# Patient Record
Sex: MALE | Race: WHITE | Employment: OTHER | URBAN - METROPOLITAN AREA
[De-identification: names, ages, dates, MRNs, and addresses within clinical notes are randomized per-mention and may not be internally consistent; named-entity substitution may affect disease eponyms.]

---

## 2017-02-20 ENCOUNTER — OFFICE VISIT (OUTPATIENT)
Dept: INTERNAL MEDICINE CLINIC | Age: 72
End: 2017-02-20

## 2017-02-20 VITALS
HEART RATE: 66 BPM | TEMPERATURE: 98.4 F | SYSTOLIC BLOOD PRESSURE: 102 MMHG | HEIGHT: 74 IN | WEIGHT: 230 LBS | BODY MASS INDEX: 29.52 KG/M2 | RESPIRATION RATE: 12 BRPM | OXYGEN SATURATION: 95 % | DIASTOLIC BLOOD PRESSURE: 63 MMHG

## 2017-02-20 DIAGNOSIS — E03.9 ACQUIRED HYPOTHYROIDISM: Primary | ICD-10-CM

## 2017-02-20 DIAGNOSIS — M79.652 BILATERAL THIGH PAIN: ICD-10-CM

## 2017-02-20 DIAGNOSIS — Z12.5 SCREENING PSA (PROSTATE SPECIFIC ANTIGEN): ICD-10-CM

## 2017-02-20 DIAGNOSIS — Z13.21 ENCOUNTER FOR VITAMIN DEFICIENCY SCREENING: ICD-10-CM

## 2017-02-20 DIAGNOSIS — M79.651 BILATERAL THIGH PAIN: ICD-10-CM

## 2017-02-20 DIAGNOSIS — R35.0 URINARY FREQUENCY: ICD-10-CM

## 2017-02-20 DIAGNOSIS — R73.01 IFG (IMPAIRED FASTING GLUCOSE): ICD-10-CM

## 2017-02-20 DIAGNOSIS — R29.898 WEAKNESS OF BOTH LEGS: ICD-10-CM

## 2017-02-20 RX ORDER — FEXOFENADINE HCL 60 MG
TABLET ORAL
COMMUNITY
End: 2017-09-07

## 2017-02-20 NOTE — MR AVS SNAPSHOT
Visit Information Date & Time Provider Department Dept. Phone Encounter #  
 2/20/2017 11:00 AM Neelima Dwyer MD [facility] [address] [id number] Upcoming Health Maintenance Date Due DTaP/Tdap/Td series (1 - Tdap) 10/10/1964 ZOSTER VACCINE AGE 60> 10/10/2003 GLAUCOMA SCREENING Q2Y 10/10/2008 MEDICARE YEARLY EXAM 10/10/2008 Pneumococcal 65+ Low/Medium Risk (2 of 2 - PPSV23) 1/19/2018 COLONOSCOPY 8/13/2023 Allergies as of 2/20/2017  Review Complete On: 2/20/2017 By: Neelima Dwyer MD  
  
 Severity Noted Reaction Type Reactions Advil [Ibuprofen]  02/20/2017    Hives Amitex Pse [Pseudoephedrine-guaifenesin]  04/11/2016    Other (comments) Dry mouth Hydrocodone-acetaminophen  04/11/2016    Rash, Itching, Swelling Levaquin [Levofloxacin]  04/11/2016    Rash Body aches Current Immunizations  Reviewed on 2/20/2017 Name Date Influenza Vaccine 1/19/2017 Pneumococcal Conjugate (PCV-13) 1/19/2017 Reviewed by Pippa Troy on 2/20/2017 at 11:26 AM  
 Reviewed by Neelima Dwyer MD on 2/20/2017 at 12:21 PM  
You Were Diagnosed With   
  
 Codes Comments Acquired hypothyroidism    -  Primary ICD-10-CM: E03.9 ICD-9-CM: 244.9 Bilateral thigh pain     ICD-10-CM: M79.651, O83.870 ICD-9-CM: 729.5 Weakness of both legs     ICD-10-CM: M62.81 ICD-9-CM: 729.89 Urinary frequency     ICD-10-CM: R35.0 ICD-9-CM: 788.41   
 IFG (impaired fasting glucose)     ICD-10-CM: R73.01 
ICD-9-CM: 790.21 Encounter for vitamin deficiency screening     ICD-10-CM: Z13.21 ICD-9-CM: V77.99 Screening PSA (prostate specific antigen)     ICD-10-CM: Z12.5 ICD-9-CM: V76.44 Vitals BP Pulse Temp Resp Height(growth percentile) Weight(growth percentile) 102/63 (BP 1 Location: Left arm, BP Patient Position: Sitting) 66 98.4 °F (36.9 °C) (Oral) 12 6' 2\" (1.88 m) 230 lb (104.3 kg) SpO2 BMI Smoking Status 95% 29.53 kg/m2 Never Smoker BMI and BSA Data Body Mass Index Body Surface Area  
 29.53 kg/m 2 2.33 m 2 Preferred Pharmacy Pharmacy Name Phone [facility] [address] AT [address] [facility] [phone] Your Updated Medication List  
  
   
This list is accurate as of: 17 12:26 PM.  Always use your most recent med list.  
  
  
  
  
 ALPRAZolam 0.25 mg tablet Commonly known as:  Pixie Oz Take  by mouth nightly. ASPIR-81 PO Take  by mouth. fexofenadine 60 mg tablet Commonly known as:  Morrie Mantis Take  by mouth.  
  
 levothyroxine 125 mcg tablet Commonly known as:  SYNTHROID Take  by mouth Daily (before breakfast). multivitamin tablet Commonly known as:  ONE A DAY Take 1 Tab by mouth daily. PARoxetine 20 mg tablet Commonly known as:  PAXIL Take  by mouth daily. varicella zoster vacine live 19,400 unit/0.65 mL Susr injection Commonly known as:  varicella-zoster vacine live 1 Vial by SubCUTAneous route once for 1 dose. Prescriptions Printed Refills  
 varicella zoster vacine live (VARICELLA-ZOSTER VACINE LIVE) 19,400 unit/0.65 mL susr injection 0 Si Vial by SubCUTAneous route once for 1 dose. Class: Print Route: SubCUTAneous We Performed the Following CBC W/O DIFF [64984 CPT(R)] CK [id number] CPT(R)] HEMOGLOBIN A1C WITH EAG [03405 CPT(R)] LIPID PANEL [39493 CPT(R)] METABOLIC PANEL, COMPREHENSIVE [35325 CPT(R)] PSA SCREENING (SCREENING) [ [facility]] T4, FREE [id number] CPT(R)] TSH 3RD GENERATION [66778 CPT(R)] URINALYSIS W/ RFLX MICROSCOPIC [53109 CPT(R)] VITAMIN D, 25 HYDROXY [id number] CPT(R)] To-Do List   
 Around 2017 Imaging:  MRI LUMB SPINE WO CONT Introducing [facility]!    
 Becki Waldrop introduces [employer] patient portal. Now you can access parts of your medical record, email your doctor's office, and request medication refills online. 1. In your internet browser, go to [email or web address] 2. Click on the First Time User? Click Here link in the Sign In box. You will see the New Member Sign Up page. 3. Enter your [employer] Access Code exactly as it appears below. You will not need to use this code after youve completed the sign-up process. If you do not sign up before the expiration date, you must request a new code. · [employer] Access Code: [email or web address] Expires: 5/21/2017 12:26 PM 
 
4. Enter the last four digits of your Social Security Number (xxxx) and Date of Birth (mm/dd/yyyy) as indicated and click Submit. You will be taken to the next sign-up page. 5. Create a [employer] ID. This will be your [employer] login ID and cannot be changed, so think of one that is secure and easy to remember. 6. Create a [employer] password. You can change your password at any time. 7. Enter your Password Reset Question and Answer. This can be used at a later time if you forget your password. 8. Enter your e-mail address. You will receive e-mail notification when new information is available in [address] 9. Click Sign Up. You can now view and download portions of your medical record. 10. Click the Download Summary menu link to download a portable copy of your medical information. If you have questions, please visit the Frequently Asked Questions section of the [employer] website. Remember, [employer] is NOT to be used for urgent needs. For medical emergencies, dial 911. Now available from your iPhone and Android! Please provide this summary of care documentation to your next provider. Your primary care clinician is listed as Shalini Anne. If you have any questions after today's visit, please call [phone].

## 2017-02-20 NOTE — PROGRESS NOTES
Patient presents to establish care. Patient states needs thyroid labs, urinary frequency for a year, over all weakness in his legs with some SOB. Had stress test 4/2016.

## 2017-02-20 NOTE — PROGRESS NOTES
HISTORY OF PRESENT ILLNESS    New patient to my practice, referred to me by his wife. Prior medical care has been from Dr. Yonis Allen.  he works as Retired . his  past medical history was reviewed, discussed, and summarized in the list below. Reports \"aching\" of anterior thighs for years. He rubs if often. Feels some muscle fatigue of legs while walking. No clear etiology. He rubs his legs to help. Had arteries checked. [place] Hx of knee arthroscopy in the past.  No recent knee issues, but reports stiffness. Reports lower back stiffness and pains. . No pain down to legs. No hx back imaging. Feels a wave come over him after eating at times. Feels more shaky and weak when that occurs. He eats and it improves. a1c 6.7% 4/2016. Wife tests his glucose and it was 90s when this occurs. Report urinary frequency for 2 years. Glucoses was ok per wife. No straining    Hypothyroidism  Reports no sig fatigue  denies heat/cold intolerance, bowel/skin changes or CVS symptoms, losing hair, feeling excessive energy, tremulousness, palpitations. Thyroid medication has been unchanged since last medication check and labs. TSH 0.456 4/6/16  Wt Readings from Last 3 Encounters:   02/20/17 230 lb (104.3 kg)   04/11/16 229 lb 9.6 oz (104.1 kg)       Review of Systems   All other systems reviewed and are negative, except as noted in HPI      Past Medical and Surgical History  Past Medical History   Diagnosis Date    Chronic anxiety     Coronary artery disease 4/11/2016     A.  EBT (10/4/10):  LM 8, , LCx 0, . Total = 331.  Environmental allergies      Dr. Bonilla Wong. also med allergies ibuprofen    Essential tremor     Hepatitis A      as child    Hypothyroidism 04/11/2016     saw Dr. Jumana Casillas int he past    IFG (impaired fasting glucose) 04/06/2016     a1c 6.7%    Lymphadenopathy      left axillary biopsy 2/14/02 - benign.   Dr. Stalin Krishna Normal cardiac stress test 04/2016 Dr. Maverick Armstrong    Obstructive sleep apnea      on CPAP. Dr. Cholo Pettit    Pancreatic mass      lymph node. EUS biopsy 1/22/02 benign, but \"possible pneumonia\"    Plantar fasciitis      Dr. Zeke Galan.  hx R cortisone injection    Screening for skin cancer      sees derm Dr. Khan Crimes    Septic shock [facility]) 04/04/2002     JW    Sleep apnea     Trigger finger      Dr. Vaughn Brunson. s/p R surgery        has a past surgical history that includes tonsillectomy; vasectomy; endoscopy (01/22/2002); lymphadenectomy (02/14/2002); knee arthroscopy (Left); colonoscopy (08/13/2013); and orthopaedic (Right). Current Outpatient Prescriptions   Medication Sig    ASPIRIN (ASPIR-81 PO) Take  by mouth.  fexofenadine (ALLEGRA) 60 mg tablet Take  by mouth.  ALPRAZolam (XANAX) 0.25 mg tablet Take  by mouth nightly.  levothyroxine (SYNTHROID) 125 mcg tablet Take  by mouth Daily (before breakfast).  PARoxetine (PAXIL) 20 mg tablet Take  by mouth daily.  multivitamin (ONE A DAY) tablet Take 1 Tab by mouth daily. No current facility-administered medications for this visit. reports that he has never smoked. He does not have any smokeless tobacco history on file. He reports that he does not drink alcohol or use illicit drugs. family history includes Cancer in his father and mother. Physical Exam   Nursing note and vitals reviewed. Blood pressure 102/63, pulse 66, temperature 98.4 °F (36.9 °C), temperature source Oral, resp. rate 12, height 6' 2\" (1.88 m), weight 230 lb (104.3 kg), SpO2 95 %. Constitutional: oriented to person, place, and time. No distress. Eyes: Conjunctivae are normal.   HEENT:  No cervical lymphadenopathy. No thyroid nodules or goiter  Cardiovascular: Normal rate. Regular rhythm, no murmurs, rubs.    No edema  Pulmonary/Chest: Effort normal. clear to auscultation  Abdominal: soft, non-tender, non-distended  Musculoskeletal:   difficulty standing from chair due to thigh pains and mild weakess  Bilateral thigh flexion 4/5.  5/5 lower leg strength  Neurological: Alert and oriented to person, place. Cranial nerves grossly intact. Normal gait   Mild tremor bilaterally  Skin: No rash noted. Psychiatric: Normal mood and affect. Behavior is normal.     ASSESSMENT and PLAN  Jasmina Faust was seen today for establish care. Diagnoses and all orders for this visit:    Acquired hypothyroidism  Unclear control  -     T4, FREE  -     TSH 3RD GENERATION    Bilateral thigh pain  Weakness of both legs  Symptoms have been relatively stable over a couple of years, but of unclear etiology. Recommend MRI to evaluate for spinal stenosis. He also does have chronic knee discomfort and consider knee and hip issues causing strain. Urinary frequency makes final issue possible. -     MRI LUMB SPINE WO CONT; Future  -     CK    Urinary frequency   Unclear etiology. Possible mild diabetes, check prostate next exam.  Check PSA. Rule out spinal  -     MRI LUMB SPINE WO CONT; Future  -     URINALYSIS W/ RFLX MICROSCOPIC  -     HEMOGLOBIN A1C WITH EAG  -     METABOLIC PANEL, COMPREHENSIVE    IFG (impaired fasting glucose)  May have some symptoms of hypoglycemia. Could consider starting metformin. Wife has diabetes. Will check labs. Recommend small frequent meals with complex carbs. -     LIPID PANEL  -     HEMOGLOBIN A1C WITH EAG  -     CBC W/O DIFF    Encounter for vitamin deficiency screening  -     VITAMIN D, 25 HYDROXY    Screening PSA (prostate specific antigen)  -     PSA - SCREENING ()    Other orders  -     varicella zoster vacine live (VARICELLA-ZOSTER VACINE LIVE) 19,400 unit/0.65 mL susr injection; 1 Vial by SubCUTAneous route once for 1 dose.       lab results and schedule of future lab studies reviewed with patient  reviewed medications and side effects in detail    Return to clinic for Medicare wellness exam

## 2017-02-22 ENCOUNTER — HOSPITAL ENCOUNTER (OUTPATIENT)
Dept: LAB | Age: 72
Discharge: HOME | End: 2017-02-22
Payer: MEDICARE

## 2017-02-22 PROCEDURE — 84443 ASSAY THYROID STIM HORMONE: CPT

## 2017-02-22 PROCEDURE — 85027 COMPLETE CBC AUTOMATED: CPT

## 2017-02-22 PROCEDURE — 36415 COLL VENOUS BLD VENIPUNCTURE: CPT

## 2017-02-22 PROCEDURE — 80061 LIPID PANEL: CPT

## 2017-02-22 PROCEDURE — 82550 ASSAY OF CK (CPK): CPT

## 2017-02-22 PROCEDURE — 84153 ASSAY OF PSA TOTAL: CPT

## 2017-02-22 PROCEDURE — 81003 URINALYSIS AUTO W/O SCOPE: CPT

## 2017-02-22 PROCEDURE — 83036 HEMOGLOBIN GLYCOSYLATED A1C: CPT

## 2017-02-22 PROCEDURE — 84439 ASSAY OF FREE THYROXINE: CPT

## 2017-02-22 PROCEDURE — 82306 VITAMIN D 25 HYDROXY: CPT

## 2017-02-22 PROCEDURE — 80053 COMPREHEN METABOLIC PANEL: CPT

## 2017-02-23 LAB
25(OH)D3+25(OH)D2 SERPL-MCNC: 29.9 NG/ML (ref 30–100)
ALBUMIN SERPL-MCNC: 3.8 G/DL (ref 3.5–4.8)
ALBUMIN/GLOB SERPL: 1.1 {RATIO} (ref 1.1–2.5)
ALP SERPL-CCNC: 117 IU/L (ref 39–117)
ALT SERPL-CCNC: 70 IU/L (ref 0–44)
APPEARANCE UR: ABNORMAL
AST SERPL-CCNC: 84 IU/L (ref 0–40)
BILIRUB SERPL-MCNC: 1.2 MG/DL (ref 0–1.2)
BILIRUB UR QL STRIP: NEGATIVE
BUN SERPL-MCNC: 14 MG/DL (ref 8–27)
BUN/CREAT SERPL: 17 (ref 10–22)
CALCIUM SERPL-MCNC: 9.2 MG/DL (ref 8.6–10.2)
CHLORIDE SERPL-SCNC: 100 MMOL/L (ref 96–106)
CHOLEST SERPL-MCNC: 161 MG/DL (ref 100–199)
CK SERPL-CCNC: 89 U/L (ref 24–204)
CO2 SERPL-SCNC: 26 MMOL/L (ref 18–29)
COLOR UR: YELLOW
CREAT SERPL-MCNC: 0.84 MG/DL (ref 0.76–1.27)
ERYTHROCYTE [DISTWIDTH] IN BLOOD BY AUTOMATED COUNT: 14.1 % (ref 12.3–15.4)
EST. AVERAGE GLUCOSE BLD GHB EST-MCNC: 151 MG/DL
GLOBULIN SER CALC-MCNC: 3.4 G/DL (ref 1.5–4.5)
GLUCOSE SERPL-MCNC: 120 MG/DL (ref 65–99)
GLUCOSE UR QL: NEGATIVE
HBA1C MFR BLD: 6.9 % (ref 4.8–5.6)
HCT VFR BLD AUTO: 44.9 % (ref 37.5–51)
HDLC SERPL-MCNC: 50 MG/DL
HGB BLD-MCNC: 15.3 G/DL (ref 12.6–17.7)
HGB UR QL STRIP: NEGATIVE
INTERPRETATION, 910389: NORMAL
KETONES UR QL STRIP: NEGATIVE
LDLC SERPL CALC-MCNC: 94 MG/DL (ref 0–99)
LEUKOCYTE ESTERASE UR QL STRIP: NEGATIVE
MCH RBC QN AUTO: 32.5 PG (ref 26.6–33)
MCHC RBC AUTO-ENTMCNC: 34.1 G/DL (ref 31.5–35.7)
MCV RBC AUTO: 95 FL (ref 79–97)
MICRO URNS: ABNORMAL
NITRITE UR QL STRIP: NEGATIVE
PH UR STRIP: 6 [PH] (ref 5–7.5)
PLATELET # BLD AUTO: 152 X10E3/UL (ref 150–379)
POTASSIUM SERPL-SCNC: 4.9 MMOL/L (ref 3.5–5.2)
PROT SERPL-MCNC: 7.2 G/DL (ref 6–8.5)
PROT UR QL STRIP: NEGATIVE
PSA SERPL-MCNC: 1.1 NG/ML (ref 0–4)
RBC # BLD AUTO: 4.71 X10E6/UL (ref 4.14–5.8)
SODIUM SERPL-SCNC: 139 MMOL/L (ref 134–144)
SP GR UR: 1.02 (ref 1–1.03)
T4 FREE SERPL-MCNC: 1.19 NG/DL (ref 0.82–1.77)
TRIGL SERPL-MCNC: 87 MG/DL (ref 0–149)
TSH SERPL DL<=0.005 MIU/L-ACNC: 0.46 UIU/ML (ref 0.45–4.5)
UROBILINOGEN UR STRIP-MCNC: 0.2 MG/DL (ref 0.2–1)
VLDLC SERPL CALC-MCNC: 17 MG/DL (ref 5–40)
WBC # BLD AUTO: 3.6 X10E3/UL (ref 3.4–10.8)

## 2017-03-01 ENCOUNTER — HOSPITAL ENCOUNTER (OUTPATIENT)
Dept: MRI IMAGING | Age: 72
Discharge: HOME | End: 2017-03-01
Attending: INTERNAL MEDICINE
Payer: MEDICARE

## 2017-03-01 DIAGNOSIS — M79.651 BILATERAL THIGH PAIN: ICD-10-CM

## 2017-03-01 DIAGNOSIS — R35.0 URINARY FREQUENCY: ICD-10-CM

## 2017-03-01 DIAGNOSIS — R29.898 WEAKNESS OF BOTH LEGS: ICD-10-CM

## 2017-03-01 DIAGNOSIS — M79.652 BILATERAL THIGH PAIN: ICD-10-CM

## 2017-03-01 PROCEDURE — 72148 MRI LUMBAR SPINE W/O DYE: CPT

## 2017-03-30 ENCOUNTER — DOCUMENTATION ONLY (OUTPATIENT)
Dept: INTERNAL MEDICINE CLINIC | Age: 72
End: 2017-03-30

## 2017-03-30 RX ORDER — ASPIRIN 81 MG/1
81 TABLET ORAL DAILY
Qty: 30 TAB | Refills: 0
Start: 2017-03-30 | End: 2017-09-07 | Stop reason: SDUPTHER

## 2017-04-04 NOTE — PROGRESS NOTES
Per Dr. Diaz Forte, changed ASA to the correct product and placed a new order.     Julio Kearney, PharmD, BCPS, CDE

## 2017-06-27 RX ORDER — ALPRAZOLAM 0.25 MG/1
0.25 TABLET ORAL
Qty: 60 TAB | Refills: 1 | OUTPATIENT
Start: 2017-06-27 | End: 2017-10-10 | Stop reason: SDUPTHER

## 2017-07-26 RX ORDER — LEVOTHYROXINE SODIUM 125 UG/1
125 TABLET ORAL
Qty: 90 TAB | Refills: 1 | Status: SHIPPED | OUTPATIENT
Start: 2017-07-26 | End: 2017-09-10 | Stop reason: SDUPTHER

## 2017-09-07 ENCOUNTER — HOSPITAL ENCOUNTER (OUTPATIENT)
Dept: LAB | Age: 72
Discharge: HOME | End: 2017-09-07
Payer: MEDICARE

## 2017-09-07 ENCOUNTER — OFFICE VISIT (OUTPATIENT)
Dept: INTERNAL MEDICINE CLINIC | Age: 72
End: 2017-09-07

## 2017-09-07 VITALS
HEIGHT: 74 IN | TEMPERATURE: 98.1 F | WEIGHT: 225 LBS | DIASTOLIC BLOOD PRESSURE: 62 MMHG | RESPIRATION RATE: 12 BRPM | SYSTOLIC BLOOD PRESSURE: 112 MMHG | HEART RATE: 68 BPM | BODY MASS INDEX: 28.88 KG/M2

## 2017-09-07 DIAGNOSIS — R73.01 IFG (IMPAIRED FASTING GLUCOSE): ICD-10-CM

## 2017-09-07 DIAGNOSIS — E03.9 ACQUIRED HYPOTHYROIDISM: ICD-10-CM

## 2017-09-07 DIAGNOSIS — Z13.31 SCREENING FOR DEPRESSION: ICD-10-CM

## 2017-09-07 DIAGNOSIS — H61.321 EXTERNAL EAR CANAL STENOSIS, INFLAMMATORY, RIGHT: ICD-10-CM

## 2017-09-07 DIAGNOSIS — Z00.00 MEDICARE ANNUAL WELLNESS VISIT, SUBSEQUENT: Primary | ICD-10-CM

## 2017-09-07 DIAGNOSIS — I25.10 CORONARY ARTERY DISEASE INVOLVING NATIVE CORONARY ARTERY OF NATIVE HEART WITHOUT ANGINA PECTORIS: ICD-10-CM

## 2017-09-07 DIAGNOSIS — H92.01 OTALGIA, RIGHT: ICD-10-CM

## 2017-09-07 DIAGNOSIS — Z71.89 ADVANCED CARE PLANNING/COUNSELING DISCUSSION: ICD-10-CM

## 2017-09-07 PROCEDURE — 84439 ASSAY OF FREE THYROXINE: CPT

## 2017-09-07 PROCEDURE — 83036 HEMOGLOBIN GLYCOSYLATED A1C: CPT

## 2017-09-07 PROCEDURE — 80053 COMPREHEN METABOLIC PANEL: CPT

## 2017-09-07 PROCEDURE — 84443 ASSAY THYROID STIM HORMONE: CPT

## 2017-09-07 PROCEDURE — 36415 COLL VENOUS BLD VENIPUNCTURE: CPT

## 2017-09-07 RX ORDER — ASPIRIN 81 MG/1
81 TABLET ORAL DAILY
Qty: 30 TAB | Refills: 11
Start: 2017-09-07 | End: 2021-10-20

## 2017-09-07 RX ORDER — TRIAMCINOLONE ACETONIDE 1 MG/G
CREAM TOPICAL 2 TIMES DAILY
Qty: 15 G | Refills: 0 | Status: SHIPPED | OUTPATIENT
Start: 2017-09-07 | End: 2017-09-14

## 2017-09-07 NOTE — PROGRESS NOTES
HISTORY OF PRESENT ILLNESS    Chief Complaint   Patient presents with    Ear Pain       Presents for follow-up    Presents with right ear pain. Was in   Onset was 4 day(s) ago. Severity is mild  Character of problem: feels mild pain, worse when pressing on it.    nothing makes the problem better. Associated symptoms include: none  Treatments tried include: medication not used    Impaired fasting glucose / Pre-diabetes follow-up  Lab Results   Component Value Date/Time    Glucose 120 02/22/2017 10:53 AM     Last hemoglobin a1c   Lab Results   Component Value Date/Time    Hemoglobin A1c 6.9 02/22/2017 10:53 AM   Diabetic diet compliance: compliant most of the time. Patient does not perform home glucose monitoring. Hypothyroidism follow-up  Reports  fatigue  denies heat/cold intolerance, bowel/skin changes or CVS symptoms, losing hair, feeling excessive energy, tremulousness, palpitations. Thyroid medication has been unchanged since last medication check and labs. Lab Results   Component Value Date/Time    TSH 0.464 02/22/2017 10:53 AM    T4, Free 1.19 02/22/2017 10:53 AM     Wt Readings from Last 3 Encounters:   09/07/17 225 lb (102.1 kg)   02/20/17 230 lb (104.3 kg)   03/01/17 225 lb (102.1 kg)         Review of Systems   All other systems reviewed and are negative, except as noted in HPI    Past Medical and Surgical History   has a past medical history of Chronic anxiety; Coronary artery disease (4/11/2016); Degenerative joint disease (DJD) of lumbar spine; Environmental allergies; Essential tremor; FH: colon cancer; Hepatitis A; Hypothyroidism (04/11/2016); IFG (impaired fasting glucose) (04/06/2016); Lymphadenopathy; Mononucleosis; Normal cardiac stress test (04/2016); Obstructive sleep apnea; Pancreatic mass; Plantar fasciitis; Screening for skin cancer; Septic shock ([facility] [address] (04/04/2002); Sleep apnea; and Trigger finger.    has a past surgical history that includes tonsillectomy; vasectomy; endoscopy (01/22/2002); lymphadenectomy (02/14/2002); knee arthroscopy (Left); colonoscopy (08/13/2013); and orthopaedic (Right). reports that he has never smoked. He has never used smokeless tobacco. He reports that he does not drink alcohol or use illicit drugs. family history includes Cancer in his father and mother. Physical Exam   Nursing note and vitals reviewed. Blood pressure 112/62, pulse 68, temperature 98.1 °F (36.7 °C), temperature source Oral, resp. rate 12, height 6' 2\" (1.88 m), weight 225 lb (102.1 kg). Constitutional:  No distress. Eyes: Conjunctivae are normal.   Ears:  Hearing grossly intact  Right ear canal normal, but mild tenderness, posterior. Normal TM  Cardiovascular: Normal rate. regular rhythm, no murmurs or gallops  No edema  Pulmonary/Chest: Effort normal.   CTAB  Musculoskeletal: moves all 4 extremities   Neurological: Alert and oriented to person, place, and time. Skin: No rash noted. Psychiatric: Normal mood and affect. Behavior is normal.     ASSESSMENT and PLAN  Diagnoses and all orders for this visit:    1. Medicare annual wellness visit, subsequent  Physician Addendum  I personally saw and examined the patient. I have discussed and reviewed note with Nurse Navigator and agree with the Nurse Navigator's findings and recommendations for this Wellness Exam.  I was present during the key portions of separately billed procedures. Orders written and signed by me as per chart. Sadie Willis MD      2. Otalgia, right - perhaps mild canal inflammation. Chronic flakiness  -     triamcinolone acetonide (KENALOG) 0.1 % topical cream; Apply  to affected area two (2) times a day for 7 days. 3. External ear canal stenosis, inflammatory, right??    4. Acquired hypothyroidism - based on my history, the overall control of this problem unclear  -     TSH 3RD GENERATION  -     T4, FREE    5. IFG (impaired fasting glucose) - consider metformin.    The patient is asked to make an attempt to improve diet and exercise patterns to aid in medical management of this problem. -     HEMOGLOBIN A1C WITH EAG  -     METABOLIC PANEL, COMPREHENSIVE    6. Coronary artery disease involving native coronary artery of native heart without angina pectoris  -     aspirin delayed-release 81 mg tablet; Take 1 Tab by mouth daily. There are no Patient Instructions on file for this visit.    lab results and schedule of future lab studies reviewed with patient  reviewed medications and side effects in detail    Return to clinic for further evaluation if new symptoms develop    Follow-up Disposition: Not on File    Current Outpatient Prescriptions   Medication Sig    aspirin delayed-release 81 mg tablet Take 1 Tab by mouth daily.  triamcinolone acetonide (KENALOG) 0.1 % topical cream Apply  to affected area two (2) times a day for 7 days.  levothyroxine (SYNTHROID) 125 mcg tablet Take 1 Tab by mouth Daily (before breakfast).  ALPRAZolam (XANAX) 0.25 mg tablet Take 1 Tab by mouth two (2) times daily as needed for Anxiety. Max Daily Amount: 0.5 mg.    PARoxetine (PAXIL) 20 mg tablet Take  by mouth daily.  multivitamin (ONE A DAY) tablet Take 1 Tab by mouth daily. No current facility-administered medications for this visit.

## 2017-09-07 NOTE — MR AVS SNAPSHOT
Visit Information Date & Time Provider Department Dept. Phone Encounter #  
 9/7/2017  1:15 PM Louellen Rinne, MD [facility] [address] [id number] Upcoming Health Maintenance Date Due  
 GLAUCOMA SCREENING Q2Y 10/10/2008 INFLUENZA AGE 9 TO ADULT 8/1/2017 Pneumococcal 65+ Low/Medium Risk (2 of 2 - PPSV23) 1/19/2018 COLONOSCOPY 8/13/2018 MEDICARE YEARLY EXAM 9/8/2018 DTaP/Tdap/Td series (2 - Td) 9/7/2027 Allergies as of 9/7/2017  Review Complete On: 9/7/2017 By: Louellen Rinne, MD  
  
 Severity Noted Reaction Type Reactions Advil [Ibuprofen]  02/20/2017    Hives Amitex Pse [Pseudoephedrine-guaifenesin]  04/11/2016    Other (comments) Dry mouth Hydrocodone-acetaminophen  04/11/2016    Rash, Itching, Swelling Levaquin [Levofloxacin]  04/11/2016    Rash Body aches Current Immunizations  Reviewed on 9/7/2017 Name Date Influenza Vaccine 1/19/2017 Pneumococcal Conjugate (PCV-13) 1/19/2017 Reviewed by Louellen Rinne, MD on 9/7/2017 at  1:32 PM  
You Were Diagnosed With   
  
 Codes Comments Otalgia, right    -  Primary ICD-10-CM: H92.01 
ICD-9-CM: 388.70 External ear canal stenosis, inflammatory, right     ICD-10-CM: R17.745 ICD-9-CM: 380.53 Acquired hypothyroidism     ICD-10-CM: E03.9 ICD-9-CM: 244.9 IFG (impaired fasting glucose)     ICD-10-CM: R73.01 
ICD-9-CM: 790.21 Coronary artery disease involving native coronary artery of native heart without angina pectoris     ICD-10-CM: I25.10 ICD-9-CM: 414.01 Vitals BP Pulse Temp Resp Height(growth percentile) Weight(growth percentile) 112/62 (BP 1 Location: Right arm, BP Patient Position: Sitting) 68 98.1 °F (36.7 °C) (Oral) 12 6' 2\" (1.88 m) 225 lb (102.1 kg) BMI Smoking Status 28.89 kg/m2 Never Smoker Vitals History BMI and BSA Data  Body Mass Index Body Surface Area  
 28.89 kg/m 2 2.31 m 2  
  
  
 Preferred Pharmacy Pharmacy Name Phone [facility] [address]  [facility] [phone] Your Updated Medication List  
  
   
This list is accurate as of: 9/7/17  1:46 PM.  Always use your most recent med list.  
  
  
  
  
 ALPRAZolam 0.25 mg tablet Commonly known as:  Dave Corti Take 1 Tab by mouth two (2) times daily as needed for Anxiety. Max Daily Amount: 0.5 mg.  
  
 aspirin delayed-release 81 mg tablet Take 1 Tab by mouth daily. levothyroxine 125 mcg tablet Commonly known as:  SYNTHROID Take 1 Tab by mouth Daily (before breakfast). multivitamin tablet Commonly known as:  ONE A DAY Take 1 Tab by mouth daily. PARoxetine 20 mg tablet Commonly known as:  PAXIL Take  by mouth daily. triamcinolone acetonide 0.1 % topical cream  
Commonly known as:  KENALOG Apply  to affected area two (2) times a day for 7 days. Prescriptions Sent to Pharmacy Refills  
 triamcinolone acetonide (KENALOG) 0.1 % topical cream 0 Sig: Apply  to affected area two (2) times a day for 7 days. Class: Normal  
 Pharmacy: [facility]  [address], [place] - [address] AT [facility] [address] [facility] [address] ([facility] Ph #: [phone] Route: Topical  
  
We Performed the Following HEMOGLOBIN A1C WITH EAG [31037 CPT(R)] METABOLIC PANEL, COMPREHENSIVE [29606 CPT(R)] T4, FREE [id number] CPT(R)] TSH 3RD GENERATION [83881 CPT(R)] Introducing [facility]! Aster English introduces [employer] patient portal. Now you can access parts of your medical record, email your doctor's office, and request medication refills online. 1. In your internet browser, go to [email or web address] 2. Click on the First Time User? Click Here link in the Sign In box. You will see the New Member Sign Up page. 3. Enter your [email or web address] Access Code exactly as it appears below. You will not need to use this code after youve completed the sign-up process. If you do not sign up before the expiration date, you must request a new code. · [employer] Access Code: [email or web address] Expires: 12/6/2017  1:46 PM 
 
4. Enter the last four digits of your Social Security Number (xxxx) and Date of Birth (mm/dd/yyyy) as indicated and click Submit. You will be taken to the next sign-up page. 5. Create a [employer] ID. This will be your [employer] login ID and cannot be changed, so think of one that is secure and easy to remember. 6. Create a [employer] password. You can change your password at any time. 7. Enter your Password Reset Question and Answer. This can be used at a later time if you forget your password. 8. Enter your e-mail address. You will receive e-mail notification when new information is available in [address] 9. Click Sign Up. You can now view and download portions of your medical record. 10. Click the Download Summary menu link to download a portable copy of your medical information. If you have questions, please visit the Frequently Asked Questions section of the [employer] website. Remember, [employer] is NOT to be used for urgent needs. For medical emergencies, dial 911. Now available from your iPhone and Android! Please provide this summary of care documentation to your next provider. Your primary care clinician is listed as Eladio Overton. If you have any questions after today's visit, please call [phone].

## 2017-09-08 PROBLEM — Z71.89 ADVANCED CARE PLANNING/COUNSELING DISCUSSION: Status: ACTIVE | Noted: 2017-09-08

## 2017-09-08 LAB
ALBUMIN SERPL-MCNC: 3.5 G/DL (ref 3.5–4.8)
ALBUMIN/GLOB SERPL: 1.1 {RATIO} (ref 1.2–2.2)
ALP SERPL-CCNC: 113 IU/L (ref 39–117)
ALT SERPL-CCNC: 48 IU/L (ref 0–44)
AST SERPL-CCNC: 68 IU/L (ref 0–40)
BILIRUB SERPL-MCNC: 0.7 MG/DL (ref 0–1.2)
BUN SERPL-MCNC: 11 MG/DL (ref 8–27)
BUN/CREAT SERPL: 14 (ref 10–24)
CALCIUM SERPL-MCNC: 9.1 MG/DL (ref 8.6–10.2)
CHLORIDE SERPL-SCNC: 104 MMOL/L (ref 96–106)
CO2 SERPL-SCNC: 26 MMOL/L (ref 18–29)
CREAT SERPL-MCNC: 0.81 MG/DL (ref 0.76–1.27)
EST. AVERAGE GLUCOSE BLD GHB EST-MCNC: 128 MG/DL
GLOBULIN SER CALC-MCNC: 3.2 G/DL (ref 1.5–4.5)
GLUCOSE SERPL-MCNC: 169 MG/DL (ref 65–99)
HBA1C MFR BLD: 6.1 % (ref 4.8–5.6)
POTASSIUM SERPL-SCNC: 4.8 MMOL/L (ref 3.5–5.2)
PROT SERPL-MCNC: 6.7 G/DL (ref 6–8.5)
SODIUM SERPL-SCNC: 142 MMOL/L (ref 134–144)
T4 FREE SERPL-MCNC: 0.79 NG/DL (ref 0.82–1.77)
TSH SERPL DL<=0.005 MIU/L-ACNC: 3.42 UIU/ML (ref 0.45–4.5)

## 2017-09-08 NOTE — PATIENT INSTRUCTIONS
Medicare Part B Preventive Services Guidelines/Limitations Date last completed and Frequency Due Date   Cardiovascular Screening Blood Tests (every 5 years)  Total cholesterol, HDL, Triglycerides Order as a panel if possible Completed 2/2017    As recommended by your PCP As recommended by your PCP or Specialist   Colorectal Cancer Screening  -Fecal occult blood test (annual)  -Flexible sigmoidoscopy (5y)  -Screening colonoscopy (10y)  -Barium Enema Age 49-80; After age [de-identified] if history of abnormal results Completed 8/13/2013     Recommended follow-up in 5 years As recommended by your PCP or Specialist     Counseling to Prevent Tobacco Use (up to 8 sessions per year)  - Counseling greater than 3 and up to 10 minutes  - Counseling greater than 10 minutes Patients must be asymptomatic of tobacco-related conditions to receive as preventive service N/A N/A   Diabetes Screening Tests (at least every 3 years, Medicare covers annually or at 6-month intervals for prediabetic patients)    Fasting blood sugar (FBS) or glucose tolerance test (GTT) Patient must be diagnosed with one of the following:  -Hypertension, Dyslipidemia, obesity, previous impaired FBS or GTT  Or any two of the following: overweight, FH of diabetes, age ? 72, history of gestational diabetes, birth of baby weighing more than 9 pounds Completed 2/2017    Recommended every 3 years for non-diabetics    Recommended every 3-6 months for Pre-Diabetics and Diabetics As recommended by your PCP or Specialist     Glaucoma Screening (no USPSTF recommendation) Diabetes mellitus, family history, , age 48 or over,  American, age 72 or over Completed within the last year    Recommended annually As recommended by your PCP or Specialist   Prostate Cancer Screening (annually up to age 76)  - Digital rectal exam (JAYLEN)  - Prostate specific antigen (PSA) Annually (age 48 or over), JAYLEN not paid separately when covered E/M service is provided on same date Completed 2/2017    Recommended annually to age 76 As recommended by your PCP or Specialist     Seasonal Influenza Vaccination (annually)  Completed 1/2017    Recommended Annually Due Fall 2017   TDAP Vaccination  Td completed 6/24/2006     Recommended every 10 years As recommended by your PCP or Specialist   Zoster (Shingles) Vaccination Covered by Medicare Part D through the pharmacy- PCP provides prescription Never received    recommended once over age 48  As recommended by your PCP or Specialist   Pneumococcal Vaccination (once after 72)  Pneumo 23-   Recommended once over the age of 72    Prevnar 15- 1/2017 Recommended once over the age of 72 As recommended by your PCP or Specialist    Complete       Ultrasound Screening for Abdominal Aortic Aneurysm (AAA) (once) Patient must be referred through IPPE and not have had a screening for abdominal aortic aneurysm before under Medicare. Limited to patients who meet one of the following criteria:  - Men who are 73-68 years old and have smoked more than 100 cigarettes in their lifetime.  -Anyone with a FH of AAA  -Anyone recommended for screening by USPSTF Not indicated unless recommended by PCP   Not indicated unless recommended by PCP     Family Practice Management 2011    Please bring a copy of your completed advance medical directive to the office so it may be added to your medical record. Thank you. If you have any questions or concerns please feel free to contact me at [phone]. It was a pleasure meeting you today and participating in your healthcare. Vandana Swan RN      Medicare Wellness Visit, Male    The best way to live healthy is to have a healthy lifestyle by eating a well-balanced diet, exercising regularly, limiting alcohol and stopping smoking. Regular physical exams and screening tests are another way to keep healthy.    Preventive exams provided by your health care provider can find health problems before they become diseases or illnesses. Preventive services including immunizations, screening tests, monitoring and exams can help you take care of your own health. All people over age 72 should have a pneumovax  and and a prevnar shot to prevent pneumonia. These are once in a lifetime unless you and your provider decide differently. All people over 65 should have a yearly flu shot and a tetanus vaccine every 10 years. Screening for diabetes mellitus with a blood sugar test should be done every year. Glaucoma is a disease of the eye due to increased ocular pressure that can lead to blindness and it should be done every year by an eye professional.    Cardiovascular screening tests that check for elevated lipids (fatty part of blood) which can lead to heart disease and strokes should be done every 5 years. Colorectal screening that evaluates for blood or polyps in your colon should be done yearly as a stool test or every five years as a flexible sigmoidoscope or every 10 years as a colonoscopy up to age 76. Men up to age 76 may need a screening blood test for prostate cancer at certain intervals, depending on their personal and family history. This decision is between the patient and his provider. If you have been a smoker or had family history of abdominal aortic aneurysms, you and your provider may decide to schedule an ultrasound test of your aorta. Hepatitis C screening is also recommended for anyone born between [birth year] through [address]. A shingles vaccine is also recommended once in a lifetime after age 61. Your Medicare Wellness Exam is recommended annually.     Here is a list of your current Health Maintenance items with a due date:  Health Maintenance Due   Topic Date Due    Glaucoma Screening   10/10/2008    Flu Vaccine  08/01/2017

## 2017-09-08 NOTE — PROGRESS NOTES
Nurse Navigator Medicare Wellness Visit performed by ABELINO Martin RN    This is a Subsequent [place] Exam (AWV) (Performed 12 months after IPPE or effective date of Medicare Part B enrollment, Once in a lifetime)    I have reviewed the patient's medical history in detail and updated the computerized patient record. History     Past Medical History:   Diagnosis Date    Chronic anxiety     Coronary artery disease 4/11/2016    A.  EBT (10/4/10):  LM 8, , LCx 0, . Total = 331.  Degenerative joint disease (DJD) of lumbar spine     MRI 3/2017 showes moderate disease and mild stenosis    Environmental allergies     Dr. Arsh Puente. also med allergies ibuprofen    Essential tremor     FH: colon cancer     mother    Hepatitis A     as child    Hypothyroidism 04/11/2016    saw Dr. Wilver Ford int he past    IFG (impaired fasting glucose) 04/06/2016    a1c 6.7%    Lymphadenopathy     left axillary biopsy 2/14/02 - benign. Dr. John Beyer    Mononucleosis     Normal cardiac stress test 04/2016    Dr. Kerethi Macias Obstructive sleep apnea     on CPAP. Dr. Yissel Washington    Pancreatic mass     lymph node. EUS biopsy 1/22/02 benign, but \"possible pneumonia\"    Plantar fasciitis     Dr. Allison Ugalde.  hx R cortisone injection    Screening for skin cancer     sees derm Dr. Joe Lomeli    Septic shock [facility]) 04/04/2002    JW    Sleep apnea     Trigger finger     Dr. Dillan Godfrey. s/p R surgery      Past Surgical History:   Procedure Laterality Date    HX COLONOSCOPY  08/13/2013    4/15/2002, 11/11/2009, 8/13/2013- Dr Rizo Mail    HX ENDOSCOPY  01/22/2002    Dr Rizo Mail    HX KNEE ARTHROSCOPY Left     HX LYMPHADENECTOMY  02/14/2002    under left arm for biopsy    HX ORTHOPAEDIC Right     trigger finger    HX TONSILLECTOMY      HX VASECTOMY       Current Outpatient Prescriptions   Medication Sig Dispense Refill    aspirin delayed-release 81 mg tablet Take 1 Tab by mouth daily.  30 Tab 11    triamcinolone acetonide (KENALOG) 0.1 % topical cream Apply  to affected area two (2) times a day for 7 days. 15 g 0    levothyroxine (SYNTHROID) 125 mcg tablet Take 1 Tab by mouth Daily (before breakfast). 90 Tab 1    ALPRAZolam (XANAX) 0.25 mg tablet Take 1 Tab by mouth two (2) times daily as needed for Anxiety. Max Daily Amount: 0.5 mg. 60 Tab 1    PARoxetine (PAXIL) 20 mg tablet Take  by mouth daily.  multivitamin (ONE A DAY) tablet Take 1 Tab by mouth daily. Allergies   Allergen Reactions    Advil [Ibuprofen] Hives    Amitex Pse [Pseudoephedrine-Guaifenesin] Other (comments)     Dry mouth    Hydrocodone-Acetaminophen Rash, Itching and Swelling    Levaquin [Levofloxacin] Rash     Body aches     Family History   Problem Relation Age of Onset    Cancer Mother      colon    Cancer Father      lung     Social History   Substance Use Topics    Smoking status: Never Smoker    Smokeless tobacco: Never Used    Alcohol use No     Patient Active Problem List   Diagnosis Code    Coronary artery disease I25.10    Obstructive sleep apnea G47.33    Hypothyroidism E03.9    Normal cardiac stress test Z13.6    Chronic anxiety F41.9    Environmental allergies Z91.09    Hepatitis A B15.9    Lymphadenopathy R59.1    Mononucleosis B27.90    Pancreatic mass K86.9    Plantar fasciitis M72.2    Screening for skin cancer Z12.83    Septic shock (HCC) A41.9, R65.21    Sleep apnea G47.30    Trigger finger M65.30    IFG (impaired fasting glucose) R73.01    Essential tremor G25.0    Degenerative joint disease (DJD) of lumbar spine M47.816    Advanced care planning/counseling discussion Z71.89       Depression Risk Factor Screening:   Patient denies feelings of being down, depressed or hopeless at this time. Patient states that they have a strong support system within their family & friends.    PHQ over the last two weeks 9/7/2017   Little interest or pleasure in doing things Not at all   Feeling down, depressed or hopeless Not at all   Total Score PHQ 2 0     Alcohol Risk Factor Screening: You do not drink alcohol or very rarely. Functional Ability and Level of Safety:   Hearing Loss  Hearing is good. Activities of Daily Living  The home contains: no safety equipment  Patient does total self care     Patient states that he lives with his wife in a private residence. Patient states independence in all ADLs & denies the use of assistive devices for ambulation. NN encouraged patient to continue and/ or introduce routine physical exercise into their daily routine as applicable & as recommended by PCP. Patient verbalized understanding & agreement to take this into consideration. Patient shares that he enjoys being active with kayaking & gardening. ADL Assessment 9/8/2017   Feeding yourself No Help Needed   Getting from bed to chair No Help Needed   Getting dressed No Help Needed   Bathing or showering No Help Needed   Walk across the room (includes cane/walker) No Help Needed   Using the telphone No Help Needed   Taking your medications No Help Needed   Preparing meals No Help Needed   Managing money (expenses/bills) No Help Needed   Moderately strenuous housework (laundry) No Help Needed   Shopping for personal items (toiletries/medicines) No Help Needed   Shopping for groceries No Help Needed   Driving No Help Needed   Climbing a flight of stairs No Help Needed   Getting to places beyond walking distances No Help Needed       Patient denies falls within the past year & verbalizes awareness of fall prevention strategies. Fall RiskFall Risk Assessment, last 12 mths 9/7/2017   Able to walk? Yes   Fall in past 12 months? No       Abuse Screen  Patient is not abused     Abuse Screening Questionnaire 9/7/2017   Do you ever feel afraid of your partner? N   Are you in a relationship with someone who physically or mentally threatens you? N   Is it safe for you to go home?  Y       Cognitive Screening   Evaluation of Cognitive Function:  Has your family/caregiver stated any concerns about your memory: no  Normal    Patient Care Team   Patient Care Team:  Fran Blackwood MD as PCP - General (Internal Medicine)  Ricci Vincent MD (Pulmonary Disease)    Assessment/Plan   Education and counseling provided:  Are appropriate based on today's review and evaluation  End-of-Life planning (with patient's consent)  Pneumococcal Vaccine  Influenza Vaccine  Prostate cancer screening tests (PSA, covered annually)  Colorectal cancer screening tests  Screening for glaucoma  tdap & shingles vaccinations    Diagnoses and all orders for this visit:    1. Medicare annual wellness visit, subsequent    2. Otalgia, right  -     triamcinolone acetonide (KENALOG) 0.1 % topical cream; Apply  to affected area two (2) times a day for 7 days. 3. External ear canal stenosis, inflammatory, right    4. Acquired hypothyroidism  -     TSH 3RD GENERATION  -     T4, FREE    5. IFG (impaired fasting glucose)  -     HEMOGLOBIN A1C WITH EAG  -     METABOLIC PANEL, COMPREHENSIVE    6. Coronary artery disease involving native coronary artery of native heart without angina pectoris  -     aspirin delayed-release 81 mg tablet; Take 1 Tab by mouth daily. 7. Advanced care planning/counseling discussion        Health Maintenance Due   Topic Date Due    GLAUCOMA SCREENING Q2Y  10/10/2008    INFLUENZA AGE 9 TO ADULT  08/01/2017     AWV Summary:    1. Patient states conversation about Advanced Medical Directive has been started, but nothing written down yet. NN encouraged patient to complete Advanced Medical Directive paperwork & bring a copy to the office for scanning into the medical record. Patient verbalized understanding & agreement.     2. Patient is up to date on the following immunizations:  Immunization History   Administered Date(s) Administered    Influenza Vaccine 01/19/2017    Pneumococcal Conjugate (PCV-13) 01/19/2017   Patient confirmed the aforementioned preventative immunization dates are correct. Patient is unable to recall the date of their last tdap vaccine. NN encouraged patient to check home records & if information obtained, to please notify PCP's office with the details. Patient verbalized agreement. Patient states that he has a prescription for the shingles vaccine, but he was unable to get the vaccine due to the high out of pocket cost. Patient verbalized awareness that he is due for a pneumonia 23 vaccine in January 2018. Patient's health maintenance immunization record has been updated & is current. 3. Patient states that he follows his PCP's recommendations for PSA screenings (report on file from 2/2017) & Colonoscopy screenings. Patient reports that his last screening colonoscopy was completed 8/13/2013 performed by Dr. Sela Lennox with recommended follow-up screening in 5 years, 2018. Patient confirmed the aforementioned preventative screening dates. 4. Patient wears corrective lenses. Patient reports having a routine eye exam & glaucoma screening within the last year performed by Dr. Laura Murdock. KELLI faxed requesting a copy of patient's last eye exam with glaucoma screening with patient's verbal approval.     Patient verbalized understanding of all information discussed. Patient was given the opportunity to ask questions. Medication reconciliation completed by MA/ LPN and reviewed by PCP. Patient provided AVS, either a printed version or electronic version in [employer], which includes Medicare Wellness Preventative Screening Table.

## 2017-09-10 RX ORDER — LEVOTHYROXINE SODIUM 137 UG/1
137 TABLET ORAL
Qty: 90 TAB | Refills: 1 | Status: SHIPPED | OUTPATIENT
Start: 2017-09-10 | End: 2018-04-24 | Stop reason: SDUPTHER

## 2017-10-10 RX ORDER — ALPRAZOLAM 0.25 MG/1
0.25 TABLET ORAL
Qty: 60 TAB | Refills: 1 | OUTPATIENT
Start: 2017-10-10 | End: 2017-12-11 | Stop reason: SDUPTHER

## 2017-10-19 RX ORDER — PAROXETINE HYDROCHLORIDE 20 MG/1
30 TABLET, FILM COATED ORAL DAILY
Qty: 135 TAB | Refills: 1 | Status: SHIPPED | OUTPATIENT
Start: 2017-10-19 | End: 2018-07-03 | Stop reason: SDUPTHER

## 2017-12-06 ENCOUNTER — HOSPITAL ENCOUNTER (OUTPATIENT)
Dept: LAB | Age: 72
Discharge: HOME | End: 2017-12-06
Payer: MEDICARE

## 2017-12-06 ENCOUNTER — OFFICE VISIT (OUTPATIENT)
Dept: INTERNAL MEDICINE CLINIC | Age: 72
End: 2017-12-06

## 2017-12-06 VITALS
WEIGHT: 233.6 LBS | HEIGHT: 74 IN | SYSTOLIC BLOOD PRESSURE: 132 MMHG | RESPIRATION RATE: 12 BRPM | HEART RATE: 65 BPM | DIASTOLIC BLOOD PRESSURE: 78 MMHG | BODY MASS INDEX: 29.98 KG/M2 | TEMPERATURE: 97.8 F

## 2017-12-06 DIAGNOSIS — R33.9 INCOMPLETE BLADDER EMPTYING: ICD-10-CM

## 2017-12-06 DIAGNOSIS — Z12.5 SPECIAL SCREENING EXAMINATION FOR NEOPLASM OF PROSTATE: ICD-10-CM

## 2017-12-06 DIAGNOSIS — M47.896 OTHER OSTEOARTHRITIS OF SPINE, LUMBAR REGION: ICD-10-CM

## 2017-12-06 DIAGNOSIS — M79.604 PAIN IN BOTH LOWER EXTREMITIES: ICD-10-CM

## 2017-12-06 DIAGNOSIS — M79.605 PAIN IN BOTH LOWER EXTREMITIES: ICD-10-CM

## 2017-12-06 DIAGNOSIS — E03.9 ACQUIRED HYPOTHYROIDISM: Primary | ICD-10-CM

## 2017-12-06 DIAGNOSIS — M48.061 SPINAL STENOSIS OF LUMBAR REGION AT MULTIPLE LEVELS: ICD-10-CM

## 2017-12-06 DIAGNOSIS — N40.0 PROSTATIC ENLARGEMENT: ICD-10-CM

## 2017-12-06 PROCEDURE — 84439 ASSAY OF FREE THYROXINE: CPT

## 2017-12-06 PROCEDURE — 84443 ASSAY THYROID STIM HORMONE: CPT

## 2017-12-06 PROCEDURE — 36415 COLL VENOUS BLD VENIPUNCTURE: CPT

## 2017-12-06 RX ORDER — TAMSULOSIN HYDROCHLORIDE 0.4 MG/1
0.4 CAPSULE ORAL DAILY
Qty: 30 CAP | Refills: 3 | Status: SHIPPED | OUTPATIENT
Start: 2017-12-06 | End: 2018-01-04 | Stop reason: SDUPTHER

## 2017-12-06 NOTE — MR AVS SNAPSHOT
Visit Information Date & Time Provider Department Dept. Phone Encounter #  
 12/6/2017  1:15 PM Zack Perez MD [facility] [address] [id number] Upcoming Health Maintenance Date Due Pneumococcal 65+ Low/Medium Risk (2 of 2 - PPSV23) 1/19/2018 COLONOSCOPY 8/13/2018 MEDICARE YEARLY EXAM 9/8/2018 GLAUCOMA SCREENING Q2Y 11/15/2018 DTaP/Tdap/Td series (2 - Td) 9/7/2027 Allergies as of 12/6/2017  Review Complete On: 12/6/2017 By: Zack Perez MD  
  
 Severity Noted Reaction Type Reactions Advil [Ibuprofen]  02/20/2017    Hives Amitex Pse [Pseudoephedrine-guaifenesin]  04/11/2016    Other (comments) Dry mouth Hydrocodone-acetaminophen  04/11/2016    Rash, Itching, Swelling Levaquin [Levofloxacin]  04/11/2016    Rash Body aches Current Immunizations  Reviewed on 12/6/2017 Name Date Influenza High Dose Vaccine PF 9/27/2017 Influenza Vaccine 1/19/2017 Pneumococcal Conjugate (PCV-13) 1/19/2017 Reviewed by Maury Higgins on 12/6/2017 at  1:20 PM  
You Were Diagnosed With   
  
 Codes Comments Acquired hypothyroidism    -  Primary ICD-10-CM: E03.9 ICD-9-CM: 244.9 Pain in both lower extremities     ICD-10-CM: M79.604, M79.605 ICD-9-CM: 729.5 Other osteoarthritis of spine, lumbar region     ICD-10-CM: M47.896 ICD-9-CM: 721.3 Spinal stenosis of lumbar region at multiple levels     ICD-10-CM: M48.061 
ICD-9-CM: 724.02 Prostatic enlargement     ICD-10-CM: N40.0 ICD-9-CM: 600.00 Special screening examination for neoplasm of prostate     ICD-10-CM: Z12.5 ICD-9-CM: V76.44 Incomplete bladder emptying     ICD-10-CM: R33.9 ICD-9-CM: 788.21 Vitals BP Pulse Temp Resp Height(growth percentile) Weight(growth percentile) 132/78 (BP 1 Location: Left arm, BP Patient Position: Sitting) 65 97.8 °F (36.6 °C) (Oral) 12 6' 2\" (1.88 m) 233 lb 9.6 oz (106 kg) BMI Smoking Status 29.99 kg/m2 Never Smoker BMI and BSA Data Body Mass Index Body Surface Area  
 29.99 kg/m 2 2.35 m 2 Preferred Pharmacy Pharmacy Name Phone [facility] [address]  [facility] [phone] Your Updated Medication List  
  
   
This list is accurate as of: 12/6/17  1:56 PM.  Always use your most recent med list.  
  
  
  
  
 ALPRAZolam 0.25 mg tablet Commonly known as:  Ace Sa Take 1 Tab by mouth two (2) times daily as needed for Anxiety. Max Daily Amount: 0.5 mg.  
  
 aspirin delayed-release 81 mg tablet Take 1 Tab by mouth daily. levothyroxine 137 mcg tablet Commonly known as:  SYNTHROID Take 137 mcg by mouth Daily (before breakfast). multivitamin tablet Commonly known as:  ONE A DAY Take 1 Tab by mouth daily. PARoxetine 20 mg tablet Commonly known as:  PAXIL Take 1.5 Tabs by mouth daily. tamsulosin 0.4 mg capsule Commonly known as:  FLOMAX Take 1 Cap by mouth daily. Indications: Symptomatic Benign Prostatic Hyperplasia Prescriptions Sent to Pharmacy Refills  
 tamsulosin (FLOMAX) 0.4 mg capsule 3 Sig: Take 1 Cap by mouth daily. Indications: Symptomatic Benign Prostatic Hyperplasia Class: Normal  
 Pharmacy: [facility]  [address], [place] - [address] AT [facility] [address] ([facility] Ph #: [phone] Route: Oral  
  
We Performed the Following T4, FREE [id number] CPT(R)] TSH 3RD GENERATION [45684 CPT(R)] Introducing [facility]! Dona Chase introduces [employer] patient portal. Now you can access parts of your medical record, email your doctor's office, and request medication refills online. 1. In your internet browser, go to [email or web address] 2. Click on the First Time User? Click Here link in the Sign In box. You will see the New Member Sign Up page. 3. Enter your [employer] Access Code exactly as it appears below. You will not need to use this code after youve completed the sign-up process. If you do not sign up before the expiration date, you must request a new code. · Any[employer]k Access Code: [email or web address] Expires: 3/6/2018  1:56 PM 
 
4. Enter the last four digits of your Social Security Number (xxxx) and Date of Birth (mm/dd/yyyy) as indicated and click Submit. You will be taken to the next sign-up page. 5. Create a AnyPerk ID. This will be your AnyPerk login ID and cannot be changed, so think of one that is secure and easy to remember. 6. Create a [employer] password. You can change your password at any time. 7. Enter your Password Reset Question and Answer. This can be used at a later time if you forget your password. 8. Enter your e-mail address. You will receive e-mail notification when new information is available in [address]. 9. Click Sign Up. You can now view and download portions of your medical record. 10. Click the Download Summary menu link to download a portable copy of your medical information. If you have questions, please visit the Frequently Asked Questions section of the AnyPerk website. Remember, AnyPerk is NOT to be used for urgent needs. For medical emergencies, dial 911. Now available from your iPhone and Android! Please provide this summary of care documentation to your next provider. Your primary care clinician is listed as Carlos Moran. If you have any questions after today's visit, please call [phone].

## 2017-12-07 LAB
T4 FREE SERPL-MCNC: 0.86 NG/DL (ref 0.82–1.77)
TSH SERPL DL<=0.005 MIU/L-ACNC: 5.49 UIU/ML (ref 0.45–4.5)

## 2017-12-07 NOTE — PROGRESS NOTES
HISTORY OF PRESENT ILLNESS    Chief Complaint   Patient presents with    Thyroid Problem       Presents for follow-up    Reports ongoing intermittent pains of both upper legs. Denies weakness. Feels pains every few weaks. MRI 3/2017 showed mild spinal stenosis and DJD    Requests prostate exam.   Reports moderate straining and incomplete bladder emptying which causes him to have to stop when on trips. Lab Results   Component Value Date/Time    Prostate Specific Ag 1.1 02/22/2017 10:53 AM       Hypothyroidism follow-up  Reports no sig fatigue  denies heat/cold intolerance, bowel/skin changes or CVS symptoms, losing hair, feeling excessive energy, tremulousness, palpitations. Thyroid medication has been increased since last medication check and labs. Lab Results   Component Value Date/Time    TSH 3.420 09/07/2017 01:59 PM    T4, Free 0.79 09/07/2017 01:59 PM     Wt Readings from Last 3 Encounters:   12/06/17 233 lb 9.6 oz (106 kg)   09/07/17 225 lb (102.1 kg)   02/20/17 230 lb (104.3 kg)       Review of Systems   All other systems reviewed and are negative, except as noted in HPI    Past Medical and Surgical History   has a past medical history of Chronic anxiety; Coronary artery disease (4/11/2016); Degenerative joint disease (DJD) of lumbar spine; Environmental allergies; Essential tremor; FH: colon cancer; Hepatitis A; Hypospadias; Hypothyroidism (04/11/2016); IFG (impaired fasting glucose) (04/06/2016); Lymphadenopathy; Mononucleosis; Normal cardiac stress test (04/2016); Obstructive sleep apnea; Pancreatic mass; Plantar fasciitis; Prostatic enlargement (12/6/2017); Screening for skin cancer; Septic shock ([facility] [address] (04/04/2002); Sleep apnea; Spinal stenosis of lumbar region at multiple levels (12/6/2017); and Trigger finger.    has a past surgical history that includes tonsillectomy; vasectomy; endoscopy (01/22/2002); lymphadenectomy (02/14/2002); knee arthroscopy (Left); colonoscopy (08/13/2013); and orthopaedic (Right). reports that he has never smoked. He has never used smokeless tobacco. He reports that he does not drink alcohol or use illicit drugs. family history includes Cancer in his father and mother. Physical Exam   Nursing note and vitals reviewed. Blood pressure 132/78, pulse 65, temperature 97.8 °F (36.6 °C), temperature source Oral, resp. rate 12, height 6' 2\" (1.88 m), weight 233 lb 9.6 oz (106 kg). Constitutional:  No distress. Eyes: Conjunctivae are normal.   Ears:  Hearing grossly intact  Cardiovascular: Normal rate. regular rhythm, no murmurs or gallops  No edema  Pulmonary/Chest: Effort normal.   CTAB  Musculoskeletal: moves all 4 extremities   Neurological: Alert and oriented to person, place, and time. Skin: No rash noted. Psychiatric: Normal mood and affect. Behavior is normal.   JAYLEN - prostate 2+, no nodules    ASSESSMENT and PLAN  Diagnoses and all orders for this visit:    1. Acquired hypothyroidism - on higher dose  -     TSH 3RD GENERATION  -     T4, FREE    2. Prostatic enlargement - moderate s/s. Start flomax  -     tamsulosin (FLOMAX) 0.4 mg capsule; Take 1 Cap by mouth daily. Indications: Symptomatic Benign Prostatic Hyperplasia    3. Incomplete bladder emptying  4. Special screening examination for neoplasm of prostate    5. Pain in both lower extremities  6. Other osteoarthritis of spine, lumbar region  7. Spinal stenosis of lumbar region at multiple levels  Discussed likely etiology. lab results and schedule of future lab studies reviewed with patient  reviewed medications and side effects in detail    Return to clinic for further evaluation if new symptoms develop    Follow-up Disposition: Not on File    Current Outpatient Prescriptions   Medication Sig    tamsulosin (FLOMAX) 0.4 mg capsule Take 1 Cap by mouth daily. Indications: Symptomatic Benign Prostatic Hyperplasia    PARoxetine (PAXIL) 20 mg tablet Take 1.5 Tabs by mouth daily.     ALPRAZolam Solomon Lat) 0.25 mg tablet Take 1 Tab by mouth two (2) times daily as needed for Anxiety. Max Daily Amount: 0.5 mg.    levothyroxine (SYNTHROID) 137 mcg tablet Take 137 mcg by mouth Daily (before breakfast).  aspirin delayed-release 81 mg tablet Take 1 Tab by mouth daily.  multivitamin (ONE A DAY) tablet Take 1 Tab by mouth daily. No current facility-administered medications for this visit.

## 2017-12-11 RX ORDER — ALPRAZOLAM 0.25 MG/1
0.25 TABLET ORAL
Qty: 60 TAB | Refills: 2 | OUTPATIENT
Start: 2017-12-11 | End: 2018-03-06 | Stop reason: SDUPTHER

## 2017-12-11 NOTE — TELEPHONE ENCOUNTER
Pt contacted pharmacy to check on his rx ,[facility] stated [facility] did not received rx. Ask that you send it in again.

## 2018-01-04 DIAGNOSIS — N40.0 PROSTATIC ENLARGEMENT: ICD-10-CM

## 2018-01-04 RX ORDER — TAMSULOSIN HYDROCHLORIDE 0.4 MG/1
CAPSULE ORAL
Qty: 90 CAP | Refills: 3 | Status: SHIPPED | OUTPATIENT
Start: 2018-01-04 | End: 2019-01-03 | Stop reason: SDUPTHER

## 2018-02-05 RX ORDER — AZITHROMYCIN 250 MG/1
TABLET, FILM COATED ORAL
Qty: 6 TAB | Refills: 0 | Status: SHIPPED | OUTPATIENT
Start: 2018-02-05 | End: 2018-02-10

## 2018-03-06 DIAGNOSIS — F41.9 CHRONIC ANXIETY: Primary | ICD-10-CM

## 2018-03-06 RX ORDER — ALPRAZOLAM 0.25 MG/1
0.25 TABLET ORAL
Qty: 60 TAB | Refills: 0 | Status: SHIPPED | OUTPATIENT
Start: 2018-03-06 | End: 2018-04-08 | Stop reason: SDUPTHER

## 2018-03-06 RX ORDER — ALPRAZOLAM 0.25 MG/1
TABLET ORAL
Qty: 60 TAB | Refills: 0 | Status: CANCELLED | OUTPATIENT
Start: 2018-03-06

## 2018-05-10 DIAGNOSIS — F41.9 CHRONIC ANXIETY: ICD-10-CM

## 2018-05-10 RX ORDER — ALPRAZOLAM 0.25 MG/1
TABLET ORAL
Qty: 60 TAB | Refills: 0 | OUTPATIENT
Start: 2018-05-10 | End: 2018-06-06 | Stop reason: SDUPTHER

## 2018-05-10 NOTE — TELEPHONE ENCOUNTER
No request was ever received from [facility] pt advised.  Appointment made for medication management 5/24-- requesting 90 day supply

## 2018-05-10 NOTE — TELEPHONE ENCOUNTER
----- Message from [facility] sent at 5/10/2018 12:55 PM EDT -----  Regarding: Dr. Justice Tony  Pt is checking on the status of a pre-authorization \"Alprazolam\" Katia on file. Pt requested this on 5/7 and not response back.   Pt best contact [phone]

## 2018-05-10 NOTE — TELEPHONE ENCOUNTER
Please call in medication as written in connect care. Thanks. 30 day rx.  Will address 90 day at visit

## 2018-05-24 ENCOUNTER — OFFICE VISIT (OUTPATIENT)
Dept: INTERNAL MEDICINE CLINIC | Age: 73
End: 2018-05-24

## 2018-05-24 ENCOUNTER — HOSPITAL ENCOUNTER (OUTPATIENT)
Dept: LAB | Age: 73
Discharge: HOME | End: 2018-05-24
Payer: MEDICARE

## 2018-05-24 VITALS
SYSTOLIC BLOOD PRESSURE: 119 MMHG | WEIGHT: 231.2 LBS | DIASTOLIC BLOOD PRESSURE: 70 MMHG | OXYGEN SATURATION: 92 % | HEART RATE: 70 BPM | BODY MASS INDEX: 29.67 KG/M2 | TEMPERATURE: 98.3 F | RESPIRATION RATE: 18 BRPM | HEIGHT: 74 IN

## 2018-05-24 DIAGNOSIS — E03.9 ACQUIRED HYPOTHYROIDISM: Primary | ICD-10-CM

## 2018-05-24 DIAGNOSIS — N40.0 PROSTATIC ENLARGEMENT: ICD-10-CM

## 2018-05-24 DIAGNOSIS — Z91.09 ENVIRONMENTAL ALLERGIES: ICD-10-CM

## 2018-05-24 DIAGNOSIS — R73.01 IFG (IMPAIRED FASTING GLUCOSE): ICD-10-CM

## 2018-05-24 PROCEDURE — 84439 ASSAY OF FREE THYROXINE: CPT

## 2018-05-24 PROCEDURE — 83036 HEMOGLOBIN GLYCOSYLATED A1C: CPT

## 2018-05-24 PROCEDURE — 36415 COLL VENOUS BLD VENIPUNCTURE: CPT

## 2018-05-24 PROCEDURE — 84153 ASSAY OF PSA TOTAL: CPT

## 2018-05-24 PROCEDURE — 84443 ASSAY THYROID STIM HORMONE: CPT

## 2018-05-24 RX ORDER — FINASTERIDE 5 MG/1
5 TABLET, FILM COATED ORAL DAILY
Qty: 90 TAB | Refills: 1 | Status: SHIPPED | OUTPATIENT
Start: 2018-05-24 | End: 2018-11-13 | Stop reason: SDUPTHER

## 2018-05-24 RX ORDER — LORATADINE 10 MG/1
10 TABLET ORAL
COMMUNITY
End: 2018-09-24 | Stop reason: ALTCHOICE

## 2018-05-24 NOTE — PROGRESS NOTES
HISTORY OF PRESENT ILLNESS    Chief Complaint   Patient presents with    Medication Evaluation     Thyroid med       Presents for follow-up    Hypothyroidism follow-up  Perhaps his tremor is increased. Also was dx with essential tremor  Reports no  fatigue  denies heat/cold intolerance, bowel/skin changes or CVS symptoms, losing hair, feeling excessive energy,palpitations. Thyroid medication has been increased since last medication check and labs. Lab Results   Component Value Date/Time    TSH 9.420 (H) 05/24/2018 02:25 PM    T4, Free 1.02 05/24/2018 02:25 PM     Wt Readings from Last 3 Encounters:   05/24/18 231 lb 3.2 oz (104.9 kg)   12/06/17 233 lb 9.6 oz (106 kg)   09/07/17 225 lb (102.1 kg)     Flomax is unclear if it helped incomplete bladder emptying. He wakes up 2-3 x per night      Review of Systems   All other systems reviewed and are negative, except as noted in HPI    Past Medical and Surgical History   has a past medical history of Chronic anxiety; Coronary artery disease (4/11/2016); Degenerative joint disease (DJD) of lumbar spine; Environmental allergies; Essential tremor; FH: colon cancer; Hepatitis A; Hypospadias; Hypothyroidism (04/11/2016); IFG (impaired fasting glucose) (04/06/2016); Lymphadenopathy; Mononucleosis; Normal cardiac stress test (04/2016); Obstructive sleep apnea; Pancreatic mass; Plantar fasciitis; Prostatic enlargement (12/6/2017); Screening for skin cancer; Septic shock ([facility] [address] (04/04/2002); Sleep apnea; Spinal stenosis of lumbar region at multiple levels (12/6/2017); and Trigger finger. has a past surgical history that includes hx tonsillectomy; hx vasectomy; hx endoscopy (01/22/2002); hx lymphadenectomy (02/14/2002); hx knee arthroscopy (Left); hx colonoscopy (08/13/2013); and hx orthopaedic (Right). reports that he has never smoked. He has never used smokeless tobacco. He reports that he does not drink alcohol or use illicit drugs.   family history includes Cancer in his father and mother. Physical Exam   Nursing note and vitals reviewed. Blood pressure 119/70, pulse 70, temperature 98.3 °F (36.8 °C), temperature source Oral, resp. rate 18, height 6' 2\" (1.88 m), weight 231 lb 3.2 oz (104.9 kg), SpO2 92 %. Constitutional:  No distress. Eyes: Conjunctivae are normal.   Ears:  Hearing grossly intact  Left nasal canal narrowing  Cardiovascular: Normal rate. regular rhythm, no murmurs or gallops  No edema  Pulmonary/Chest: Effort normal.   CTAB  Musculoskeletal: moves all 4 extremities   Neurological: Alert and oriented to person, place, and time. Skin: No rash noted. Psychiatric: Normal mood and affect. Behavior is normal.     ASSESSMENT and PLAN  Diagnoses and all orders for this visit:    1. Acquired hypothyroidism . TSH is low. His tremor is unrelated to his thyroid. He also has essential tremor. Increase levothyroxine to 150 mcg. Repeat in 3 months for  -     TSH 3RD GENERATION  -     T4, FREE    2. IFG (impaired fasting glucose)  The patient is asked to make an attempt to improve diet and exercise patterns to aid in medical management of this problem. \  -     HEMOGLOBIN A1C WITH EAG    3. Prostatic enlargement  -     PSA W/ REFLX FREE PSA  -     Start finasteride (PROSCAR) 5 mg tablet; Take 1 Tab by mouth daily. Indications: benign prostatic hyperplasia with lower urinary tract sx  Cont flomax for now.      lab results and schedule of future lab studies reviewed with patient  reviewed medications and side effects in detail    Return to clinic for further evaluation if new symptoms develop    Follow-up Disposition: Not on File    Current Outpatient Prescriptions   Medication Sig    loratadine (CLARITIN) 10 mg tablet Take 10 mg by mouth.  guaiFENesin (MUCINEX) 1,200 mg Ta12 ER tablet Take 1,200 mg by mouth two (2) times a day.  finasteride (PROSCAR) 5 mg tablet Take 1 Tab by mouth daily.  Indications: benign prostatic hyperplasia with lower urinary tract sx    ALPRAZolam (XANAX) 0.25 mg tablet TAKE 1 TABLET BY MOUTH TWICE DAILY AS NEEDED FOR ANXIETY    levothyroxine (SYNTHROID) 137 mcg tablet TAKE 1 TABLET BY MOUTH DAILY BEFORE BREAKFAST    tamsulosin (FLOMAX) 0.4 mg capsule TAKE ONE CAPSULE BY MOUTH DAILY    PARoxetine (PAXIL) 20 mg tablet Take 1.5 Tabs by mouth daily.  aspirin delayed-release 81 mg tablet Take 1 Tab by mouth daily.  multivitamin (ONE A DAY) tablet Take 1 Tab by mouth daily. No current facility-administered medications for this visit.

## 2018-05-24 NOTE — MR AVS SNAPSHOT
[facility] 
 
 
 [address] [place] [address] [facility] 
[phone] Patient: Shon Hughes MRN: [id number] CATARINA:[id number] Visit Information Date & Time Provider Department Dept. Phone Encounter #  
 5/24/2018  1:15 PM Lisa Singleton MD [facility] [address] [id number] Upcoming Health Maintenance Date Due Pneumococcal 65+ Low/Medium Risk (2 of 2 - PPSV23) 1/19/2018 COLONOSCOPY 8/13/2018 Influenza Age 5 to Adult 8/1/2018 MEDICARE YEARLY EXAM 9/8/2018 GLAUCOMA SCREENING Q2Y 11/15/2018 DTaP/Tdap/Td series (2 - Td) 9/7/2027 Allergies as of 5/24/2018  Review Complete On: 5/24/2018 By: Lisa Singleton MD  
  
 Severity Noted Reaction Type Reactions Advil [Ibuprofen]  02/20/2017    Hives Amitex Pse [Pseudoephedrine-guaifenesin]  04/11/2016    Other (comments) Dry mouth Hydrocodone-acetaminophen  04/11/2016    Rash, Itching, Swelling Levaquin [Levofloxacin]  04/11/2016    Rash Body aches Current Immunizations  Reviewed on 12/6/2017 Name Date Influenza High Dose Vaccine PF 9/27/2017 Influenza Vaccine 1/19/2017 Pneumococcal Conjugate (PCV-13) 1/19/2017 Not reviewed this visit You Were Diagnosed With   
  
 Codes Comments Acquired hypothyroidism    -  Primary ICD-10-CM: E03.9 ICD-9-CM: 244.9 IFG (impaired fasting glucose)     ICD-10-CM: R73.01 
ICD-9-CM: 790.21 Prostatic enlargement     ICD-10-CM: N40.0 ICD-9-CM: 600.00 Vitals BP Pulse Temp Resp Height(growth percentile) Weight(growth percentile) 119/70 (BP 1 Location: Left arm, BP Patient Position: Sitting) 70 98.3 °F (36.8 °C) (Oral) 18 6' 2\" (1.88 m) 231 lb 3.2 oz (104.9 kg) SpO2 BMI Smoking Status 92% 29.68 kg/m2 Never Smoker Vitals History BMI and BSA Data Body Mass Index Body Surface Area  
 29.68 kg/m 2 2.34 m 2 Preferred Pharmacy Pharmacy Name Phone [facility] [address], [address] [place] [address] [address] AT [address] [facility] [phone] Your Updated Medication List  
  
   
This list is accurate as of 5/24/18  1:52 PM.  Always use your most recent med list.  
  
  
  
  
 ALPRAZolam 0.25 mg tablet Commonly known as:  XANAX  
TAKE 1 TABLET BY MOUTH TWICE DAILY AS NEEDED FOR ANXIETY  
  
 aspirin delayed-release 81 mg tablet Take 1 Tab by mouth daily. CLARITIN 10 mg tablet Generic drug:  loratadine Take 10 mg by mouth. finasteride 5 mg tablet Commonly known as:  PROSCAR Take 1 Tab by mouth daily. Indications: benign prostatic hyperplasia with lower urinary tract sx  
  
 levothyroxine 137 mcg tablet Commonly known as:  SYNTHROID  
TAKE 1 TABLET BY MOUTH DAILY BEFORE BREAKFAST MUCINEX 1,200 mg Ta12 ER tablet Generic drug:  guaiFENesin Take 1,200 mg by mouth two (2) times a day. multivitamin tablet Commonly known as:  ONE A DAY Take 1 Tab by mouth daily. PARoxetine 20 mg tablet Commonly known as:  PAXIL Take 1.5 Tabs by mouth daily. tamsulosin 0.4 mg capsule Commonly known as:  FLOMAX TAKE ONE CAPSULE BY MOUTH DAILY Prescriptions Sent to Pharmacy Refills  
 finasteride (PROSCAR) 5 mg tablet 1 Sig: Take 1 Tab by mouth daily. Indications: benign prostatic hyperplasia with lower urinary tract sx Class: Normal  
 Pharmacy: [facility] [address], [place] - [address] AT [facility] [address] [facility] [address] ([facility] Ph #: [phone] Route: Oral  
  
We Performed the Following HEMOGLOBIN A1C WITH EAG [79558 CPT(R)] PSA W/ REFLX FREE PSA [10660 CPT(R)] T4, FREE [id number] CPT(R)] TSH 3RD GENERATION [56699 CPT(R)] Introducing [facility]!    
 Cedrick Dahl introduces [employer] patient portal. Now you can access parts of your medical record, email your doctor's office, and request medication refills online. 1. In your internet browser, go to [email or web address] 2. Click on the First Time User? Click Here link in the Sign In box. You will see the New Member Sign Up page. 3. Enter your [employer] Access Code exactly as it appears below. You will not need to use this code after youve completed the sign-up process. If you do not sign up before the expiration date, you must request a new code. · [employer] Access Code: [email or web address] Expires: 8/22/2018  1:52 PM 
 
4. Enter the last four digits of your Social Security Number (xxxx) and Date of Birth (mm/dd/yyyy) as indicated and click Submit. You will be taken to the next sign-up page. 5. Create a [employer] ID. This will be your [employer] login ID and cannot be changed, so think of one that is secure and easy to remember. 6. Create a [employer] password. You can change your password at any time. 7. Enter your Password Reset Question and Answer. This can be used at a later time if you forget your password. 8. Enter your e-mail address. You will receive e-mail notification when new information is available in [address] 9. Click Sign Up. You can now view and download portions of your medical record. 10. Click the Download Summary menu link to download a portable copy of your medical information. If you have questions, please visit the Frequently Asked Questions section of the [employer] website. Remember, [employer] is NOT to be used for urgent needs. For medical emergencies, dial 911. Now available from your iPhone and Android! Please provide this summary of care documentation to your next provider. Your primary care clinician is listed as Anna Case. If you have any questions after today's visit, please call [phone].

## 2018-05-25 LAB
EST. AVERAGE GLUCOSE BLD GHB EST-MCNC: 143 MG/DL
HBA1C MFR BLD: 6.6 % (ref 4.8–5.6)
PSA SERPL-MCNC: 1 NG/ML (ref 0–4)
REFLEX CRITERIA: NORMAL
T4 FREE SERPL-MCNC: 1.02 NG/DL (ref 0.82–1.77)
TSH SERPL DL<=0.005 MIU/L-ACNC: 9.42 UIU/ML (ref 0.45–4.5)

## 2018-05-26 RX ORDER — LEVOTHYROXINE SODIUM 150 UG/1
150 TABLET ORAL
Qty: 90 TAB | Refills: 1 | Status: SHIPPED | OUTPATIENT
Start: 2018-05-26 | End: 2019-02-13 | Stop reason: SDUPTHER

## 2018-06-06 DIAGNOSIS — F41.9 CHRONIC ANXIETY: ICD-10-CM

## 2018-06-06 RX ORDER — ALPRAZOLAM 0.25 MG/1
TABLET ORAL
Qty: 60 TAB | Refills: 0 | OUTPATIENT
Start: 2018-06-06 | End: 2018-07-09 | Stop reason: SDUPTHER

## 2018-06-06 NOTE — TELEPHONE ENCOUNTER
----- Message from Mary Harman sent at 6/6/2018 11:58 AM EDT -----  Regarding: Dr. Antonio Brizuela  Patient is waiting on a pre authorization on Lorazepam. The patient's number is [phone].

## 2018-06-07 ENCOUNTER — TELEPHONE (OUTPATIENT)
Dept: INTERNAL MEDICINE CLINIC | Age: 73
End: 2018-06-07

## 2018-06-07 NOTE — TELEPHONE ENCOUNTER
----- Message from Wilfrido Metzger sent at 6/6/2018  4:57 PM EDT -----  Regarding: Dr. Danish [phone]   Pt stated, Joaquim COWART  sent a request on Monday for \"Alprazolam .25mg\" Rx needing prior authorization. Pt is f/up to his call placed this morning, please advise the status.

## 2018-09-24 ENCOUNTER — HOSPITAL ENCOUNTER (OUTPATIENT)
Dept: LAB | Age: 73
Discharge: HOME | End: 2018-09-24
Payer: MEDICARE

## 2018-09-24 ENCOUNTER — OFFICE VISIT (OUTPATIENT)
Dept: INTERNAL MEDICINE CLINIC | Age: 73
End: 2018-09-24

## 2018-09-24 VITALS
HEIGHT: 74 IN | TEMPERATURE: 97.9 F | WEIGHT: 227 LBS | OXYGEN SATURATION: 93 % | RESPIRATION RATE: 18 BRPM | BODY MASS INDEX: 29.13 KG/M2 | DIASTOLIC BLOOD PRESSURE: 67 MMHG | SYSTOLIC BLOOD PRESSURE: 118 MMHG | HEART RATE: 63 BPM

## 2018-09-24 DIAGNOSIS — E03.9 ACQUIRED HYPOTHYROIDISM: ICD-10-CM

## 2018-09-24 DIAGNOSIS — F41.9 CHRONIC ANXIETY: ICD-10-CM

## 2018-09-24 DIAGNOSIS — Z00.00 MEDICARE ANNUAL WELLNESS VISIT, SUBSEQUENT: Primary | ICD-10-CM

## 2018-09-24 DIAGNOSIS — R73.01 IFG (IMPAIRED FASTING GLUCOSE): ICD-10-CM

## 2018-09-24 PROCEDURE — 84443 ASSAY THYROID STIM HORMONE: CPT

## 2018-09-24 PROCEDURE — 80053 COMPREHEN METABOLIC PANEL: CPT

## 2018-09-24 PROCEDURE — 85027 COMPLETE CBC AUTOMATED: CPT

## 2018-09-24 PROCEDURE — 36415 COLL VENOUS BLD VENIPUNCTURE: CPT

## 2018-09-24 PROCEDURE — 84439 ASSAY OF FREE THYROXINE: CPT

## 2018-09-24 PROCEDURE — 83036 HEMOGLOBIN GLYCOSYLATED A1C: CPT

## 2018-09-24 NOTE — PROGRESS NOTES
This is a Subsequent Medicare Annual Wellness Visit providing Personalized Prevention Plan Services (PPPS) (Performed 12 months after initial AWV and PPPS )    I have reviewed the patient's medical history in detail and updated the computerized patient record. History     Past Medical History:   Diagnosis Date    Chronic anxiety     Coronary artery disease 4/11/2016    A.  EBT (10/4/10):  LM 8, , LCx 0, . Total = 331.  Degenerative joint disease (DJD) of lumbar spine     MRI 3/2017 showes moderate disease and mild stenosis    Environmental allergies     Dr. Tony Suero. also med allergies ibuprofen    Essential tremor     FH: colon cancer     mother    Hepatitis A     as child    Hypospadias     Hypothyroidism 04/11/2016    saw Dr. Vaughn Left int he past    IFG (impaired fasting glucose) 04/06/2016    a1c 6.7%    Lymphadenopathy     left axillary biopsy 2/14/02 - benign. Dr. Floyce Schaumann    Mononucleosis     Normal cardiac stress test 04/2016    Dr. Tonia Green Obstructive sleep apnea     on CPAP. Dr. Tejeda Smoker    Pancreatic mass     lymph node. EUS biopsy 1/22/02 benign, but \"possible pneumonia\"    Plantar fasciitis     Dr. Suly Lopez.  hx R cortisone injection    Prostatic enlargement 12/6/2017    Screening for skin cancer     sees derm Dr. Campos Climceferino Septic shock [facility]) 04/04/2002    JW    Sleep apnea     Spinal stenosis of lumbar region at multiple levels 12/6/2017    Trigger finger     Dr. Aroldo Cha.   s/p R surgery       Past Surgical History:   Procedure Laterality Date    HX COLONOSCOPY  08/13/2013    4/15/2002, 11/11/2009, 8/13/2013- Dr Enzo Varner    HX ENDOSCOPY  01/22/2002    Dr Enzo Varner    HX KNEE ARTHROSCOPY Left     HX LYMPHADENECTOMY  02/14/2002    under left arm for biopsy    HX ORTHOPAEDIC Right     trigger finger    HX TONSILLECTOMY      HX VASECTOMY         Current Outpatient Prescriptions   Medication Sig    ALPRAZolam (XANAX) 0.25 mg tablet TAKE 1 TABLET BY MOUTH TWICE DAILY AS NEEDED FOR ANXIETY    PARoxetine (PAXIL) 20 mg tablet TAKE 1 AND 1/2 TABLETS BY MOUTH DAILY    levothyroxine (SYNTHROID) 150 mcg tablet Take 1 Tab by mouth Daily (before breakfast). Increased dose 5.26.18    finasteride (PROSCAR) 5 mg tablet Take 1 Tab by mouth daily. Indications: benign prostatic hyperplasia with lower urinary tract sx    tamsulosin (FLOMAX) 0.4 mg capsule TAKE ONE CAPSULE BY MOUTH DAILY    aspirin delayed-release 81 mg tablet Take 1 Tab by mouth daily.  multivitamin (ONE A DAY) tablet Take 1 Tab by mouth daily. No current facility-administered medications for this visit. Allergies   Allergen Reactions    Advil [Ibuprofen] Hives    Amitex Pse [Pseudoephedrine-Guaifenesin] Other (comments)     Dry mouth    Hydrocodone-Acetaminophen Rash, Itching and Swelling    Levaquin [Levofloxacin] Rash     Body aches       Family History   Problem Relation Age of Onset    Cancer Mother      colon    Cancer Father      lung        reports that he has never smoked. He has never used smokeless tobacco.   reports that he does not drink alcohol. Depression Risk Factor Screening:       Alcohol Risk Factor Screening: On any occasion during the past 3 months, have you had more than 3 drinks containing alcohol? No    Do you average more than 14 drinks per week? No      Functional Ability and Level of Safety:     Hearing Loss   mild    Activities of Daily Living   Self-care. Requires assistance with: no ADLs    Fall Risk     Fall Risk Assessment, last 12 mths 5/24/2018   Able to walk? Yes   Fall in past 12 months? No         Abuse Screen   Patient is not abused    Review of Systems   A comprehensive review of systems was negative except for that written in the HPI.     Physical Examination     Evaluation of Cognitive Function:  Mood/affect:  neutral, happy  Appearance: age appropriate  Family member/caregiver input: none    Blood pressure 118/67, pulse 63, temperature 97.9 °F (36.6 °C), temperature source Oral, resp. rate 18, height 6' 2\" (1.88 m), weight 227 lb (103 kg), SpO2 93 %. General appearance: alert, cooperative, no distress, appears stated age  Neck: supple, symmetrical, trachea midline, no adenopathy, thyroid: not enlarged, symmetric, no tenderness/mass/nodules, no carotid bruit and no JVD  Lungs: clear to auscultation bilaterally  Heart: regular rate and rhythm, S1, S2 normal, no murmur, click, rub or gallop  Extremities: extremities normal, atraumatic, no cyanosis or edema    Patient Care Team:  Renetta Carter MD as PCP - General (Internal Medicine)  Melyssa Lilly MD (Pulmonary Disease)      Advice/Referrals/Counseling   Education and counseling provided. See below for specific orders    Assessment/Plan   Diagnoses and all orders for this visit:    1. Medicare annual wellness visit, subsequent  -     METABOLIC PANEL, COMPREHENSIVE    Potential medication side effects were discussed with the patient; let me know if any occur.   Return for yearly Annual Wellness Visits

## 2018-09-24 NOTE — PATIENT INSTRUCTIONS

## 2018-09-24 NOTE — MR AVS SNAPSHOT
[address] [place] [address] 
 
 
 [address] [place] [address] 
[phone] Patient: Mat Leone MRN: [id number] Visit Information Date & Time Provider Department Dept. Phone Encounter #  
 9/24/2018  1:15 PM Ca Mcnally MD [facility] [address] [facility] [place] [id number] Upcoming Health Maintenance Date Due  
 GLAUCOMA SCREENING Q2Y 11/15/2018 COLONOSCOPY 12/24/2018* Shingrix Vaccine Age 50> (1 of 2) 10/31/2019* Pneumococcal 65+ Low/Medium Risk (2 of 2 - PPSV23) 9/11/2019 MEDICARE YEARLY EXAM 9/25/2019 DTaP/Tdap/Td series (2 - Td) 9/7/2027 *Topic was postponed. The date shown is not the original due date. Allergies as of 9/24/2018  Review Complete On: 9/24/2018 By: Ca Mcnally MD  
  
 Severity Noted Reaction Type Reactions Advil [Ibuprofen]  02/20/2017    Hives Amitex Pse [Pseudoephedrine-guaifenesin]  04/11/2016    Other (comments) Dry mouth Hydrocodone-acetaminophen  04/11/2016    Rash, Itching, Swelling Levaquin [Levofloxacin]  04/11/2016    Rash Body aches Current Immunizations  Reviewed on 9/24/2018 Name Date Influenza High Dose Vaccine PF 9/11/2018, 9/27/2017 Influenza Vaccine 1/19/2017 Pneumococcal Conjugate (PCV-13) 9/11/2018, 1/19/2017 Reviewed by Alexa Scott LPN on 8/62/3056 at  1:21 PM  
 Reviewed by Ca Mcnally MD on 9/24/2018 at  1:46 PM  
 Reviewed by Ca Mcnally MD on 9/24/2018 at  1:46 PM  
You Were Diagnosed With   
  
 Codes Comments Medicare annual wellness visit, subsequent    -  Primary ICD-10-CM: Z00.00 ICD-9-CM: V70.0 Acquired hypothyroidism     ICD-10-CM: E03.9 ICD-9-CM: 244.9 IFG (impaired fasting glucose)     ICD-10-CM: R73.01 
ICD-9-CM: 790.21 Chronic anxiety     ICD-10-CM: F41.9 ICD-9-CM: 300.00 Vitals BP Pulse Temp Resp Height(growth percentile) Weight(growth percentile) 118/67 (BP 1 Location: Left arm, BP Patient Position: Sitting) 63 97.9 °F (36.6 °C) (Oral) 18 6' 2\" (1.88 m) 227 lb (103 kg) SpO2 BMI Smoking Status 93% 29.15 kg/m2 Never Smoker Vitals History BMI and BSA Data Body Mass Index Body Surface Area  
 29.15 kg/m 2 2.32 m 2 Preferred Pharmacy Pharmacy Name Phone [facility] [address]  [facility] [phone] Your Updated Medication List  
  
   
This list is accurate as of 9/24/18  1:55 PM.  Always use your most recent med list.  
  
  
  
  
 ALPRAZolam 0.25 mg tablet Commonly known as:  XANAX  
TAKE 1 TABLET BY MOUTH TWICE DAILY AS NEEDED FOR ANXIETY  
  
 aspirin delayed-release 81 mg tablet Take 1 Tab by mouth daily. finasteride 5 mg tablet Commonly known as:  PROSCAR Take 1 Tab by mouth daily. Indications: benign prostatic hyperplasia with lower urinary tract sx  
  
 levothyroxine 150 mcg tablet Commonly known as:  SYNTHROID Take 1 Tab by mouth Daily (before breakfast). Increased dose 5.26.18  
  
 multivitamin tablet Commonly known as:  ONE A DAY Take 1 Tab by mouth daily. PARoxetine 20 mg tablet Commonly known as:  PAXIL TAKE 1 AND 1/2 TABLETS BY MOUTH DAILY  
  
 tamsulosin 0.4 mg capsule Commonly known as:  FLOMAX TAKE ONE CAPSULE BY MOUTH DAILY We Performed the Following CBC W/O DIFF [44535 CPT(R)] HEMOGLOBIN A1C WITH EAG [75441 CPT(R)] METABOLIC PANEL, COMPREHENSIVE [02093 CPT(R)] T4, FREE [id number] CPT(R)] TSH 3RD GENERATION [66754 CPT(R)] Patient Instructions Well Visit, Over 72: Care Instructions Your Care Instructions Physical exams can help you stay healthy. Your doctor has checked your overall health and may have suggested ways to take good care of yourself. He or she also may have recommended tests.  At home, you can help prevent illness with healthy eating, regular exercise, and other steps. Follow-up care is a key part of your treatment and safety. Be sure to make and go to all appointments, and call your doctor if you are having problems. It's also a good idea to know your test results and keep a list of the medicines you take. How can you care for yourself at home? · Reach and stay at a healthy weight. This will lower your risk for many problems, such as obesity, diabetes, heart disease, and high blood pressure. · Get at least 30 minutes of exercise on most days of the week. Walking is a good choice. You also may want to do other activities, such as running, swimming, cycling, or playing tennis or team sports. · Do not smoke. Smoking can make health problems worse. If you need help quitting, talk to your doctor about stop-smoking programs and medicines. These can increase your chances of quitting for good. · Protect your skin from too much sun. When you're outdoors from 10 a.m. to 4 p.m., stay in the shade or cover up with clothing and a hat with a wide brim. Wear sunglasses that block UV rays. Even when it's cloudy, put broad-spectrum sunscreen (SPF 30 or higher) on any exposed skin. · See a dentist one or two times a year for checkups and to have your teeth cleaned. · Wear a seat belt in the car. · Limit alcohol to 2 drinks a day for men and 1 drink a day for women. Too much alcohol can cause health problems. Follow your doctor's advice about when to have certain tests. These tests can spot problems early. For men and women · Cholesterol. Your doctor will tell you how often to have this done based on your overall health and other things that can increase your risk for heart attack and stroke. · Blood pressure. Have your blood pressure checked during a routine doctor visit. Your doctor will tell you how often to check your blood pressure based on your age, your blood pressure results, and other factors. · Diabetes. Ask your doctor whether you should have tests for diabetes. · Vision. Experts recommend that you have yearly exams for glaucoma and other age-related eye problems. · Hearing. Tell your doctor if you notice any change in your hearing. You can have tests to find out how well you hear. · Colon cancer tests. Keep having colon cancer tests as your doctor recommends. You can have one of several types of tests. · Heart attack and stroke risk. At least every 4 to 6 years, you should have your risk for heart attack and stroke assessed. Your doctor uses factors such as your age, blood pressure, cholesterol, and whether you smoke or have diabetes to show what your risk for a heart attack or stroke is over the next 10 years. · Osteoporosis. Talk to your doctor about whether you should have a bone density test to find out whether you have thinning bones. Also ask your doctor about whether you should take calcium and vitamin D supplements. For women · Pap test and pelvic exam. You may no longer need a Pap test. Talk with your doctor about whether to stop or continue to have Pap tests. · Breast exam and mammogram. Ask how often you should have a mammogram, which is an X-ray of your breasts. A mammogram can spot breast cancer before it can be felt and when it is easiest to treat. · Thyroid disease. Talk to your doctor about whether to have your thyroid checked as part of a regular physical exam. Women have an increased chance of a thyroid problem. For men · Prostate exam. Talk to your doctor about whether you should have a blood test (called a PSA test) for prostate cancer. Experts disagree on whether men should have this test. Some experts recommend that you discuss the benefits and risks of the test with your doctor. · Abdominal aortic aneurysm. Ask your doctor whether you should have a test to check for an aneurysm.  You may need a test if you ever smoked or if your parent, brother, sister, or child has had an aneurysm. When should you call for help? Watch closely for changes in your health, and be sure to contact your doctor if you have any problems or symptoms that concern you. Where can you learn more? Go to http://mary-mildred.info/. Enter Y231 in the search box to learn more about \"Well Visit, Over 65: Care Instructions. \" Current as of: May 16, 2017 Content Version: 11.7 © 1037-5112 Healthwise, Incorporated. Care instructions adapted under license by [employer] (which disclaims liability or warranty for this information). If you have questions about a medical condition or this instruction, always ask your healthcare professional. [address] any warranty or liability for your use of this information. Introducing \[facility] [place] [facility]\"" & HEALTH SERVICES! Danita Quintana introduces [employer] patient portal. Now you can access parts of your medical record, email your doctor's office, and request medication refills online. 1. In your internet browser, go to [email or web address] 2. Click on the First Time User? Click Here link in the Sign In box. You will see the New Member Sign Up page. 3. Enter your [employer] Access Code exactly as it appears below. You will not need to use this code after youve completed the sign-up process. If you do not sign up before the expiration date, you must request a new code. · [employer] Access Code: [email or web address] Expires: 12/23/2018  1:55 PM 
 
4. Enter the last four digits of your Social Security Number (xxxx) and Date of Birth (mm/dd/yyyy) as indicated and click Submit. You will be taken to the next sign-up page. 5. Create a [employer] ID. This will be your [employer] login ID and cannot be changed, so think of one that is secure and easy to remember. 6. Create a Best [employer] password. You can change your password at any time. 7. Enter your Password Reset Question and Answer. This can be used at a later time if you forget your password. 8. Enter your e-mail address. You will receive e-mail notification when new information is available in [address] 9. Click Sign Up. You can now view and download portions of your medical record. 10. Click the Download Summary menu link to download a portable copy of your medical information. If you have questions, please visit the Frequently Asked Questions section of the [employer] website. Remember, [employer] is NOT to be used for urgent needs. For medical emergencies, dial 911. Now available from your iPhone and Android! Please provide this summary of care documentation to your next provider. Your primary care clinician is listed as Nnamdi Allen. If you have any questions after today's visit, please call [phone].

## 2018-09-24 NOTE — PROGRESS NOTES
HISTORY OF PRESENT ILLNESS    Chief Complaint   Patient presents with    Thyroid Problem     f/u to med change       Presents for follow-up    Thyroid Disease:  Dina Shea is a 74 y.o. male here for follow up of hypothyroidism. Lab Results   Component Value Date/Time    TSH 9.420 (H) 05/24/2018 02:25 PM     Residual symptoms denies fatigue, weight changes, heat/cold intolerance, bowel/skin changes or CVS symptoms. he denies denies fatigue, weight changes, heat/cold intolerance, bowel/skin changes or CVS symptoms  Thyroid medication has been increased since last medication check and labs. Impaired fasting glucose / Pre-diabetes follow-up  Lab Results   Component Value Date/Time    Glucose 169 (H) 09/07/2017 01:59 PM     Last hemoglobin a1c   Lab Results   Component Value Date/Time    Hemoglobin A1c 6.6 (H) 05/24/2018 02:25 PM   Diabetic diet compliance: compliant most of the time. Patient does not perform home glucose monitoring. Review of Systems   All other systems reviewed and are negative, except as noted in HPI    Past Medical and Surgical History   has a past medical history of Chronic anxiety; Coronary artery disease (4/11/2016); Degenerative joint disease (DJD) of lumbar spine; Environmental allergies; Essential tremor; FH: colon cancer; Hepatitis A; Hypospadias; Hypothyroidism (04/11/2016); IFG (impaired fasting glucose) (04/06/2016); Lymphadenopathy; Mononucleosis; Normal cardiac stress test (04/2016); Obstructive sleep apnea; Pancreatic mass; Plantar fasciitis; Prostatic enlargement (12/6/2017); Screening for skin cancer; Septic shock ([facility] [address] (04/04/2002); Sleep apnea; Spinal stenosis of lumbar region at multiple levels (12/6/2017); and Trigger finger. has a past surgical history that includes hx tonsillectomy; hx vasectomy; hx endoscopy (01/22/2002); hx lymphadenectomy (02/14/2002); hx knee arthroscopy (Left); hx colonoscopy (08/13/2013); and hx orthopaedic (Right).      reports that he has never smoked. He has never used smokeless tobacco. He reports that he does not drink alcohol or use illicit drugs. family history includes Cancer in his father and mother. Physical Exam   Nursing note and vitals reviewed. Blood pressure 118/67, pulse 63, temperature 97.9 °F (36.6 °C), temperature source Oral, resp. rate 18, height 6' 2\" (1.88 m), weight 227 lb (103 kg), SpO2 93 %. Constitutional:  No distress. Eyes: Conjunctivae are normal.   Ears:  Hearing grossly intact  Cardiovascular: Normal rate. regular rhythm, no murmurs or gallops  No edema  Pulmonary/Chest: Effort normal.   CTAB  Musculoskeletal: moves all 4 extremities   Neurological: Alert and oriented to person, place, and time. Skin: No rash noted. Psychiatric: Normal mood and affect. Behavior is normal.     ASSESSMENT and PLAN  Diagnoses and all orders for this visit:    2. Acquired hypothyroidism- unclear control  -     TSH 3RD GENERATION  -     T4, FREE    3. IFG (impaired fasting glucose)- Controlled on current regimen. Continue current medications as written in chart.  -     HEMOGLOBIN A1C WITH EAG  -     CBC W/O DIFF    4. Chronic anxiety  Controlled on current regimen. Continue current medications as written in chart. lab results and schedule of future lab studies reviewed with patient  reviewed medications and side effects in detail    Return to clinic for further evaluation if new symptoms develop    Follow-up Disposition: Not on File    Current Outpatient Prescriptions   Medication Sig    ALPRAZolam (XANAX) 0.25 mg tablet TAKE 1 TABLET BY MOUTH TWICE DAILY AS NEEDED FOR ANXIETY    PARoxetine (PAXIL) 20 mg tablet TAKE 1 AND 1/2 TABLETS BY MOUTH DAILY    levothyroxine (SYNTHROID) 150 mcg tablet Take 1 Tab by mouth Daily (before breakfast). Increased dose 5.26.18    finasteride (PROSCAR) 5 mg tablet Take 1 Tab by mouth daily.  Indications: benign prostatic hyperplasia with lower urinary tract sx    tamsulosin (FLOMAX) 0.4 mg capsule TAKE ONE CAPSULE BY MOUTH DAILY    aspirin delayed-release 81 mg tablet Take 1 Tab by mouth daily.  multivitamin (ONE A DAY) tablet Take 1 Tab by mouth daily. No current facility-administered medications for this visit.

## 2018-09-25 LAB
ALBUMIN SERPL-MCNC: 3.6 G/DL (ref 3.5–4.8)
ALBUMIN/GLOB SERPL: 1.1 {RATIO} (ref 1.2–2.2)
ALP SERPL-CCNC: 96 IU/L (ref 39–117)
ALT SERPL-CCNC: 33 IU/L (ref 0–44)
AST SERPL-CCNC: 56 IU/L (ref 0–40)
BILIRUB SERPL-MCNC: 1.2 MG/DL (ref 0–1.2)
BUN SERPL-MCNC: 14 MG/DL (ref 8–27)
BUN/CREAT SERPL: 17 (ref 10–24)
CALCIUM SERPL-MCNC: 9.2 MG/DL (ref 8.6–10.2)
CHLORIDE SERPL-SCNC: 104 MMOL/L (ref 96–106)
CO2 SERPL-SCNC: 25 MMOL/L (ref 20–29)
CREAT SERPL-MCNC: 0.84 MG/DL (ref 0.76–1.27)
ERYTHROCYTE [DISTWIDTH] IN BLOOD BY AUTOMATED COUNT: 14.8 % (ref 12.3–15.4)
EST. AVERAGE GLUCOSE BLD GHB EST-MCNC: 131 MG/DL
GLOBULIN SER CALC-MCNC: 3.2 G/DL (ref 1.5–4.5)
GLUCOSE SERPL-MCNC: 145 MG/DL (ref 65–99)
HBA1C MFR BLD: 6.2 % (ref 4.8–5.6)
HCT VFR BLD AUTO: 41.3 % (ref 37.5–51)
HGB BLD-MCNC: 13.8 G/DL (ref 13–17.7)
MCH RBC QN AUTO: 32.5 PG (ref 26.6–33)
MCHC RBC AUTO-ENTMCNC: 33.4 G/DL (ref 31.5–35.7)
MCV RBC AUTO: 97 FL (ref 79–97)
PLATELET # BLD AUTO: 132 X10E3/UL (ref 150–379)
POTASSIUM SERPL-SCNC: 4.7 MMOL/L (ref 3.5–5.2)
PROT SERPL-MCNC: 6.8 G/DL (ref 6–8.5)
RBC # BLD AUTO: 4.24 X10E6/UL (ref 4.14–5.8)
SODIUM SERPL-SCNC: 141 MMOL/L (ref 134–144)
T4 FREE SERPL-MCNC: 1.14 NG/DL (ref 0.82–1.77)
TSH SERPL DL<=0.005 MIU/L-ACNC: 0.79 UIU/ML (ref 0.45–4.5)
WBC # BLD AUTO: 3.2 X10E3/UL (ref 3.4–10.8)

## 2018-12-18 ENCOUNTER — OFFICE VISIT (OUTPATIENT)
Dept: INTERNAL MEDICINE CLINIC | Age: 73
End: 2018-12-18

## 2018-12-18 ENCOUNTER — HOSPITAL ENCOUNTER (OUTPATIENT)
Dept: LAB | Age: 73
Discharge: HOME | End: 2018-12-18
Payer: MEDICARE

## 2018-12-18 VITALS
HEART RATE: 62 BPM | HEIGHT: 74 IN | TEMPERATURE: 97.7 F | OXYGEN SATURATION: 98 % | BODY MASS INDEX: 29.39 KG/M2 | WEIGHT: 229 LBS | DIASTOLIC BLOOD PRESSURE: 78 MMHG | SYSTOLIC BLOOD PRESSURE: 117 MMHG | RESPIRATION RATE: 14 BRPM

## 2018-12-18 DIAGNOSIS — Z11.59 ENCOUNTER FOR HEPATITIS C SCREENING TEST FOR LOW RISK PATIENT: ICD-10-CM

## 2018-12-18 DIAGNOSIS — D72.819 LEUKOPENIA, UNSPECIFIED TYPE: Primary | ICD-10-CM

## 2018-12-18 PROCEDURE — 82607 VITAMIN B-12: CPT

## 2018-12-18 PROCEDURE — 83615 LACTATE (LD) (LDH) ENZYME: CPT

## 2018-12-18 PROCEDURE — 36415 COLL VENOUS BLD VENIPUNCTURE: CPT

## 2018-12-18 PROCEDURE — 85007 BL SMEAR W/DIFF WBC COUNT: CPT

## 2018-12-18 RX ORDER — CLONAZEPAM 0.5 MG/1
0.5 TABLET ORAL
COMMUNITY

## 2018-12-18 NOTE — PATIENT INSTRUCTIONS
Body Mass Index: Care Instructions  Your Care Instructions    Body mass index (BMI) can help you see if your weight is raising your risk for health problems. It uses a formula to compare how much you weigh with how tall you are. · A BMI lower than 18.5 is considered underweight. · A BMI between 18.5 and 24.9 is considered healthy. · A BMI between 25 and 29.9 is considered overweight. A BMI of 30 or higher is considered obese. If your BMI is in the normal range, it means that you have a lower risk for weight-related health problems. If your BMI is in the overweight or obese range, you may be at increased risk for weight-related health problems, such as high blood pressure, heart disease, stroke, arthritis or joint pain, and diabetes. If your BMI is in the underweight range, you may be at increased risk for health problems such as fatigue, lower protection (immunity) against illness, muscle loss, bone loss, hair loss, and hormone problems. BMI is just one measure of your risk for weight-related health problems. You may be at higher risk for health problems if you are not active, you eat an unhealthy diet, or you drink too much alcohol or use tobacco products. Follow-up care is a key part of your treatment and safety. Be sure to make and go to all appointments, and call your doctor if you are having problems. It's also a good idea to know your test results and keep a list of the medicines you take. How can you care for yourself at home? · Practice healthy eating habits. This includes eating plenty of fruits, vegetables, whole grains, lean protein, and low-fat dairy. · If your doctor recommends it, get more exercise. Walking is a good choice. Bit by bit, increase the amount you walk every day. Try for at least 30 minutes on most days of the week. · Do not smoke. Smoking can increase your risk for health problems. If you need help quitting, talk to your doctor about stop-smoking programs and medicines. These can increase your chances of quitting for good. · Limit alcohol to 2 drinks a day for men and 1 drink a day for women. Too much alcohol can cause health problems. If you have a BMI higher than 25  · Your doctor may do other tests to check your risk for weight-related health problems. This may include measuring the distance around your waist. A waist measurement of more than 40 inches in men or 35 inches in women can increase the risk of weight-related health problems. · Talk with your doctor about steps you can take to stay healthy or improve your health. You may need to make lifestyle changes to lose weight and stay healthy, such as changing your diet and getting regular exercise. If you have a BMI lower than 18.5  · Your doctor may do other tests to check your risk for health problems. · Talk with your doctor about steps you can take to stay healthy or improve your health. You may need to make lifestyle changes to gain or maintain weight and stay healthy, such as getting more healthy foods in your diet and doing exercises to build muscle. Where can you learn more? Go to http://mary-mildred.info/. Enter S176 in the search box to learn more about \"Body Mass Index: Care Instructions. \"  Current as of: October 13, 2016  Content Version: 11.4  © 8249-0573 Healthwise, Incorporated. Care instructions adapted under license by [employer] (which disclaims liability or warranty for this information). If you have questions about a medical condition or this instruction, always ask your healthcare professional. [address] any warranty or liability for your use of this information.

## 2018-12-19 NOTE — PROGRESS NOTES
HISTORY OF PRESENT ILLNESS    Chief Complaint   Patient presents with   Michael for follow-up of abnormal CBC. Low WBC and platelets. No bruising or bleeding. He has some occasional night sweats, but also has limb movement d/o and wakes up. Was changed to clonazepam per psych  Lab Results   Component Value Date/Time    WBC 3.2 (L) 09/24/2018 02:16 PM    HGB 13.8 09/24/2018 02:16 PM    HCT 41.3 09/24/2018 02:16 PM    PLATELET 426 (L) 94/55/6103 02:16 PM    MCV 97 09/24/2018 02:16 PM     Weight is stable  Wt Readings from Last 3 Encounters:   12/18/18 229 lb (103.9 kg)   09/24/18 227 lb (103 kg)   05/24/18 231 lb 3.2 oz (104.9 kg)         Review of Systems   All other systems reviewed and are negative, except as noted in HPI    Past Medical and Surgical History   has a past medical history of Chronic anxiety, Coronary artery disease, Degenerative joint disease (DJD) of lumbar spine, Environmental allergies, Essential tremor, FH: colon cancer, Hepatitis A, Hypospadias, Hypothyroidism, IFG (impaired fasting glucose), Lymphadenopathy, Mononucleosis, Normal cardiac stress test, Obstructive sleep apnea, Pancreatic mass, Plantar fasciitis, Prostatic enlargement, Screening for skin cancer, Septic shock ([facility] [address] Sleep apnea, Spinal stenosis of lumbar region at multiple levels, and Trigger finger. has a past surgical history that includes hx tonsillectomy; hx vasectomy; hx endoscopy (01/22/2002); hx lymphadenectomy (02/14/2002); hx knee arthroscopy (Left); hx colonoscopy (08/13/2013); hx orthopaedic (Right); and hx colonoscopy (11/27/2018). reports that  has never smoked. he has never used smokeless tobacco. He reports that he does not drink alcohol or use drugs. family history includes Cancer in his father and mother. Physical Exam   Nursing note and vitals reviewed. Blood pressure 117/78, pulse 62, temperature 97.7 °F (36.5 °C), temperature source Oral, resp.  rate 14, height 6' 2\" (1.88 m), weight 229 lb (103.9 kg), SpO2 98 %. Constitutional:  No distress. Eyes: Conjunctivae are normal.   Ears:  Hearing grossly intact  Cardiovascular: Normal rate. regular rhythm, no murmurs or gallops  No edema  Pulmonary/Chest: Effort normal.   CTAB  Musculoskeletal: moves all 4 extremities   Neurological: Alert and oriented to person, place, and time. Skin: No rash noted. Psychiatric: Normal mood and affect. Behavior is normal.     ASSESSMENT and PLAN  Diagnoses and all orders for this visit:    1. Leukopenia, unspecified type  Thrombocytopenia. Unclear etiology. repeat  -     CBC+PLATELET+HEM REVIEW  -     LD  -     VITAMIN B12 & FOLATE  -     HCV AB W/RFLX TO LES    2. Encounter for hepatitis C screening test for low risk patient  -     HCV AB W/RFLX TO LES        lab results and schedule of future lab studies reviewed with patient  reviewed medications and side effects in detail    Return to clinic for further evaluation if new symptoms develop    Follow-up Disposition: Not on File    Current Outpatient Medications   Medication Sig    clonazePAM (KLONOPIN) 0.5 mg tablet Take  by mouth nightly.  finasteride (PROSCAR) 5 mg tablet TAKE 1 TABLET BY MOUTH DAILY    PARoxetine (PAXIL) 20 mg tablet TAKE 1 AND 1/2 TABLETS BY MOUTH DAILY    levothyroxine (SYNTHROID) 150 mcg tablet Take 1 Tab by mouth Daily (before breakfast). Increased dose 5.26.18    tamsulosin (FLOMAX) 0.4 mg capsule TAKE ONE CAPSULE BY MOUTH DAILY    aspirin delayed-release 81 mg tablet Take 1 Tab by mouth daily.  multivitamin (ONE A DAY) tablet Take 1 Tab by mouth daily. No current facility-administered medications for this visit.

## 2018-12-20 LAB
BASOPHILS # BLD MANUAL: 0 X10E3/UL (ref 0–0.2)
BASOPHILS NFR BLD MANUAL: 0 %
DIFFERENTIAL COMMENT, 115260: ABNORMAL
EOSINOPHIL # BLD MANUAL: 0.1 X10E3/UL (ref 0–0.4)
EOSINOPHIL NFR BLD MANUAL: 3 %
ERYTHROCYTE [DISTWIDTH] IN BLOOD BY AUTOMATED COUNT: 15.1 % (ref 12.3–15.4)
FOLATE SERPL-MCNC: >20 NG/ML
HCT VFR BLD AUTO: 42.2 % (ref 37.5–51)
HGB BLD-MCNC: 13.5 G/DL (ref 13–17.7)
LDH SERPL-CCNC: 179 IU/L (ref 121–224)
LYMPHOCYTES # BLD MANUAL: 1.3 X10E3/UL (ref 0.7–3.1)
LYMPHOCYTES NFR BLD MANUAL: 38 %
MCH RBC QN AUTO: 31.9 PG (ref 26.6–33)
MCHC RBC AUTO-ENTMCNC: 32 G/DL (ref 31.5–35.7)
MCV RBC AUTO: 100 FL (ref 79–97)
MONOCYTES # BLD MANUAL: 0.3 X10E3/UL (ref 0.1–0.9)
MONOCYTES NFR BLD MANUAL: 10 %
NEUTROPHILS # BLD MANUAL: 1.6 X10E3/UL (ref 1.4–7)
NEUTROPHILS NFR BLD MANUAL: 49 %
PLATELET # BLD AUTO: 141 X10E3/UL (ref 150–379)
PLATELET BLD QL SMEAR: ABNORMAL
RBC # BLD AUTO: 4.23 X10E6/UL (ref 4.14–5.8)
RBC MORPH BLD: ABNORMAL
VIT B12 SERPL-MCNC: 191 PG/ML (ref 232–1245)
WBC # BLD AUTO: 3.3 X10E3/UL (ref 3.4–10.8)

## 2019-03-14 DIAGNOSIS — N40.0 PROSTATIC ENLARGEMENT: ICD-10-CM

## 2019-03-14 RX ORDER — FINASTERIDE 5 MG/1
TABLET, FILM COATED ORAL
Qty: 90 TAB | Refills: 0 | Status: SHIPPED | OUTPATIENT
Start: 2019-03-14 | End: 2019-08-19 | Stop reason: SDUPTHER

## 2019-03-25 ENCOUNTER — HOSPITAL ENCOUNTER (OUTPATIENT)
Dept: LAB | Age: 74
Discharge: HOME | End: 2019-03-25
Payer: MEDICARE

## 2019-03-25 ENCOUNTER — OFFICE VISIT (OUTPATIENT)
Dept: INTERNAL MEDICINE CLINIC | Age: 74
End: 2019-03-25

## 2019-03-25 VITALS
TEMPERATURE: 97.9 F | HEART RATE: 71 BPM | BODY MASS INDEX: 29.81 KG/M2 | HEIGHT: 74 IN | DIASTOLIC BLOOD PRESSURE: 60 MMHG | SYSTOLIC BLOOD PRESSURE: 124 MMHG | OXYGEN SATURATION: 95 % | WEIGHT: 232.25 LBS | RESPIRATION RATE: 18 BRPM

## 2019-03-25 DIAGNOSIS — R53.82 CHRONIC FATIGUE: Primary | ICD-10-CM

## 2019-03-25 DIAGNOSIS — G47.52 REM SLEEP BEHAVIOR DISORDER: ICD-10-CM

## 2019-03-25 DIAGNOSIS — R59.0 ABDOMINAL LYMPHADENOPATHY: ICD-10-CM

## 2019-03-25 DIAGNOSIS — R73.01 IFG (IMPAIRED FASTING GLUCOSE): ICD-10-CM

## 2019-03-25 DIAGNOSIS — E53.8 B12 DEFICIENCY: ICD-10-CM

## 2019-03-25 DIAGNOSIS — E03.9 HYPOTHYROIDISM, UNSPECIFIED TYPE: ICD-10-CM

## 2019-03-25 DIAGNOSIS — D72.819 LEUKOPENIA, UNSPECIFIED TYPE: ICD-10-CM

## 2019-03-25 PROCEDURE — 82607 VITAMIN B-12: CPT

## 2019-03-25 PROCEDURE — 85007 BL SMEAR W/DIFF WBC COUNT: CPT

## 2019-03-25 PROCEDURE — 83036 HEMOGLOBIN GLYCOSYLATED A1C: CPT

## 2019-03-25 PROCEDURE — 83921 ORGANIC ACID SINGLE QUANT: CPT

## 2019-03-25 PROCEDURE — 84443 ASSAY THYROID STIM HORMONE: CPT

## 2019-03-25 PROCEDURE — 36415 COLL VENOUS BLD VENIPUNCTURE: CPT

## 2019-03-25 RX ORDER — LEVOCETIRIZINE DIHYDROCHLORIDE 5 MG/1
TABLET, FILM COATED ORAL
COMMUNITY
End: 2019-06-24 | Stop reason: ALTCHOICE

## 2019-03-27 NOTE — PROGRESS NOTES
HISTORY OF PRESENT ILLNESS Chief Complaint Patient presents with  Fatigue Presents for follow-up History of chronic fatigue. Symptoms are relatively stable. Was diagnosed with borderline low B12 levels and has been taking an oral B12 supplement for the last couple of months. Denies any side effects. Does not feel it helps him feel any better. Leukopenia and mild thrombocytopenia. This was newly diagnosed on labs in September 2018. Previous CBC in February 2017 was normal.  Repeat CBC December 2018 was stable, listed below. Lab Results Component Value Date/Time WBC 3.3 (L) 12/18/2018 01:05 PM  
 HGB 13.5 12/18/2018 01:05 PM  
 HCT 42.2 12/18/2018 01:05 PM  
 PLATELET 143 (L) 48/03/9479 01:05 PM  
  (H) 12/18/2018 01:05 PM  
 
 
Impaired fasting glucose / Pre-diabetes follow-up Lab Results Component Value Date/Time Glucose 145 (H) 09/24/2018 02:16 PM  
 
Last hemoglobin a1c Lab Results Component Value Date/Time Hemoglobin A1c 6.2 (H) 09/24/2018 02:16 PM  
Diabetic diet compliance: compliant most of the time. Patient does not perform home glucose monitoring. Hypothyroidism follow-up Reports chronic fatigue 
denies heat/cold intolerance, bowel/skin changes or CVS symptoms, losing hair, feeling excessive energy, tremulousness, palpitations. Thyroid medication has been unchanged since last medication check and labs. Lab Results Component Value Date/Time TSH 0.793 09/24/2018 02:16 PM  
 T4, Free 1.14 09/24/2018 02:16 PM  
 
Wt Readings from Last 3 Encounters:  
03/25/19 232 lb 4 oz (105.3 kg) 12/18/18 229 lb (103.9 kg) 09/24/18 227 lb (103 kg) Review of Systems All other systems reviewed and are negative, except as noted in HPI Past Medical and Surgical History 
 has a past medical history of Abdominal lymphadenopathy, Chronic anxiety, Coronary artery disease (4/11/2016), Degenerative joint disease (DJD) of lumbar spine, Environmental allergies, Essential tremor, FH: colon cancer, Hepatitis A, Hypospadias, Hypothyroidism (04/11/2016), IFG (impaired fasting glucose) (04/06/2016), Lymphadenopathy, Mononucleosis, Normal cardiac stress test (04/2016), Obstructive sleep apnea, Plantar fasciitis, Prostatic enlargement (12/6/2017), REM sleep behavior disorder, Screening for skin cancer, Septic shock ([address] (04/04/2002), Sleep apnea, Spinal stenosis of lumbar region at multiple levels (12/6/2017), and Trigger finger. has a past surgical history that includes hx tonsillectomy; hx vasectomy; hx endoscopy (01/22/2002); hx lymphadenectomy (02/14/2002); hx knee arthroscopy (Left); hx colonoscopy (08/13/2013); hx orthopaedic (Right); and hx colonoscopy (11/27/2018). reports that he has never smoked. He has never used smokeless tobacco. He reports that he does not drink alcohol or use drugs. family history includes Cancer in his father and mother. Physical Exam  
Nursing note and vitals reviewed. Blood pressure 124/60, pulse 71, temperature 97.9 °F (36.6 °C), temperature source Oral, resp. rate 18, height 6' 2\" (1.88 m), weight 232 lb 4 oz (105.3 kg), SpO2 95 %. Constitutional:  No distress. Eyes: Conjunctivae are normal.  
Ears:  Hearing grossly intact Cardiovascular: Normal rate. regular rhythm, no murmurs or gallops No edema Pulmonary/Chest: Effort normal.   CTAB Musculoskeletal: moves all 4 extremities Neurological: Alert and oriented to person, place, and time. Skin: No rash noted. Psychiatric: Normal mood and affect. Behavior is normal.  
 
ASSESSMENT and PLAN Diagnoses and all orders for this visit: 
 
1. Chronic fatigue- multi-factorial 
 
2. B12 deficiency- taking oral supplement. If not absorbing, will rx injections weekly x4, then monthly 
-     VITAMIN B12 
-     METHYLMALONIC ACID 3. Leukopenia, unspecified type Thrombocytopenia. Onset 6 months ago a little bit earlier because there was no CBC immediately prior. Asymptomatic. Workup for if needed. B12 def could be contributing. 
-     CBC+PLATELET+HEM REVIEW 4. Abdominal lymphadenopathy This was a previous diagnosis. 2002. No real evidence of any other adenopathy exam but could consider repeating CT to evaluate for myeloproliferative process if his blood cell count is low. Consider hematology referral.  
-     CBC+PLATELET+HEM REVIEW 5. Hypothyroidism, unspecified type Controlled on current regimen. Continue current medications as written in chart. 
-     TSH 3RD GENERATION 6. REM sleep behavior disorder 7. IFG (impaired fasting glucose) The patient is asked to make an attempt to improve diet and exercise patterns to aid in medical management of this problem. -     HEMOGLOBIN A1C WITH EAG 
 
lab results and schedule of future lab studies reviewed with patient 
reviewed medications and side effects in detail Return to clinic for further evaluation if new symptoms develop Current Outpatient Medications Medication Sig  levocetirizine (XYZAL) 5 mg tablet Take  by mouth daily as needed for Allergies.  finasteride (PROSCAR) 5 mg tablet TAKE 1 TABLET BY MOUTH DAILY  levothyroxine (SYNTHROID) 150 mcg tablet TAKE 1 TABLET BY MOUTH DAILY BEFORE BREAKFAST. INCREASE DOSE 5.26.18  
 tamsulosin (FLOMAX) 0.4 mg capsule TAKE ONE CAPSULE BY MOUTH DAILY  clonazePAM (KLONOPIN) 0.5 mg tablet Take  by mouth nightly.  PARoxetine (PAXIL) 20 mg tablet TAKE 1 AND 1/2 TABLETS BY MOUTH DAILY  aspirin delayed-release 81 mg tablet Take 1 Tab by mouth daily.  multivitamin (ONE A DAY) tablet Take 1 Tab by mouth daily. No current facility-administered medications for this visit.

## 2019-03-28 LAB
BASOPHILS # BLD MANUAL: 0 X10E3/UL (ref 0–0.2)
BASOPHILS NFR BLD MANUAL: 0 %
DIFFERENTIAL COMMENT, 115260: ABNORMAL
EOSINOPHIL # BLD MANUAL: 0.1 X10E3/UL (ref 0–0.4)
EOSINOPHIL NFR BLD MANUAL: 3 %
ERYTHROCYTE [DISTWIDTH] IN BLOOD BY AUTOMATED COUNT: 15.2 % (ref 12.3–15.4)
HBA1C MFR BLD: 6.9 % (ref 4.8–5.6)
HCT VFR BLD AUTO: 43.7 % (ref 37.5–51)
HGB BLD-MCNC: 14 G/DL (ref 13–17.7)
LYMPHOCYTES # BLD MANUAL: 1.6 X10E3/UL (ref 0.7–3.1)
LYMPHOCYTES NFR BLD MANUAL: 36 %
Lab: NORMAL
MCH RBC QN AUTO: 31.6 PG (ref 26.6–33)
MCHC RBC AUTO-ENTMCNC: 32 G/DL (ref 31.5–35.7)
MCV RBC AUTO: 99 FL (ref 79–97)
METHYLMALONATE SERPL-SCNC: 284 NMOL/L (ref 0–378)
MONOCYTES # BLD MANUAL: 0.4 X10E3/UL (ref 0.1–0.9)
MONOCYTES NFR BLD MANUAL: 10 %
NEUTROPHILS # BLD MANUAL: 2.2 X10E3/UL (ref 1.4–7)
NEUTROPHILS NFR BLD MANUAL: 51 %
PLATELET # BLD AUTO: 135 X10E3/UL (ref 150–379)
PLATELET BLD QL SMEAR: ABNORMAL
RBC # BLD AUTO: 4.43 X10E6/UL (ref 4.14–5.8)
RBC MORPH BLD: ABNORMAL
TSH SERPL DL<=0.005 MIU/L-ACNC: 3.25 UIU/ML (ref 0.45–4.5)
VIT B12 SERPL-MCNC: 267 PG/ML (ref 232–1245)
WBC # BLD AUTO: 4.4 X10E3/UL (ref 3.4–10.8)

## 2019-04-01 RX ORDER — LEVOTHYROXINE SODIUM 175 UG/1
175 TABLET ORAL
Qty: 90 TAB | Refills: 1 | Status: SHIPPED | OUTPATIENT
Start: 2019-04-01 | End: 2019-10-01

## 2019-04-11 ENCOUNTER — TELEPHONE (OUTPATIENT)
Dept: INTERNAL MEDICINE CLINIC | Age: 74
End: 2019-04-11

## 2019-04-11 NOTE — TELEPHONE ENCOUNTER
I spoke with patient, he wanted to schedule his 3 month follow up appt with PCP, he has a question regarding the message in his result letter: Your vitamin B12 levels are relatively close to normal.  There is no sign of severe B12 deficiency. That said, they are still borderline low when we might consider doing B12 injections in the office for 4 weeks in a row and then once monthly to see if it helps you feel better. Pt Wants to know if he should start the injections now or wait until after his appt in June? I advise pcp is off until Monday, I will send the message and we will call him back next week. Pt verbalized understanding.

## 2019-04-11 NOTE — TELEPHONE ENCOUNTER
Since his B12 levels are in the normal range and his MMA level is normal, he does not have significant b12 deficiency and I actually dont think the injections will help much.   Take oral b12 1000 mg daily

## 2019-04-12 NOTE — TELEPHONE ENCOUNTER
I spoke with patient to advise of PCP's message. Pt verbalized understanding and was thankful for the call.

## 2019-06-24 ENCOUNTER — OFFICE VISIT (OUTPATIENT)
Dept: INTERNAL MEDICINE CLINIC | Age: 74
End: 2019-06-24

## 2019-06-24 ENCOUNTER — HOSPITAL ENCOUNTER (OUTPATIENT)
Dept: LAB | Age: 74
Discharge: HOME | End: 2019-06-24
Payer: MEDICARE

## 2019-06-24 VITALS
OXYGEN SATURATION: 97 % | WEIGHT: 218.5 LBS | DIASTOLIC BLOOD PRESSURE: 51 MMHG | RESPIRATION RATE: 18 BRPM | HEIGHT: 74 IN | HEART RATE: 60 BPM | TEMPERATURE: 97.9 F | SYSTOLIC BLOOD PRESSURE: 107 MMHG | BODY MASS INDEX: 28.04 KG/M2

## 2019-06-24 DIAGNOSIS — R73.01 IFG (IMPAIRED FASTING GLUCOSE): ICD-10-CM

## 2019-06-24 DIAGNOSIS — E03.9 ACQUIRED HYPOTHYROIDISM: Primary | ICD-10-CM

## 2019-06-24 DIAGNOSIS — D69.6 THROMBOCYTOPENIA (HCC): ICD-10-CM

## 2019-06-24 DIAGNOSIS — E53.8 B12 DEFICIENCY: ICD-10-CM

## 2019-06-24 PROCEDURE — 85027 COMPLETE CBC AUTOMATED: CPT

## 2019-06-24 PROCEDURE — 80061 LIPID PANEL: CPT

## 2019-06-24 PROCEDURE — 83921 ORGANIC ACID SINGLE QUANT: CPT

## 2019-06-24 PROCEDURE — 82607 VITAMIN B-12: CPT

## 2019-06-24 PROCEDURE — 36415 COLL VENOUS BLD VENIPUNCTURE: CPT

## 2019-06-24 PROCEDURE — 83036 HEMOGLOBIN GLYCOSYLATED A1C: CPT

## 2019-06-24 PROCEDURE — 84443 ASSAY THYROID STIM HORMONE: CPT

## 2019-06-24 PROCEDURE — 84439 ASSAY OF FREE THYROXINE: CPT

## 2019-06-24 RX ORDER — DOXYCYCLINE 100 MG/1
CAPSULE ORAL
Refills: 6 | COMMUNITY
Start: 2019-05-01 | End: 2019-09-26 | Stop reason: ALTCHOICE

## 2019-06-24 RX ORDER — LANOLIN ALCOHOL/MO/W.PET/CERES
1000 CREAM (GRAM) TOPICAL DAILY
COMMUNITY

## 2019-06-24 NOTE — PATIENT INSTRUCTIONS
Body Mass Index: Care Instructions Your Care Instructions Body mass index (BMI) can help you see if your weight is raising your risk for health problems. It uses a formula to compare how much you weigh with how tall you are. · A BMI lower than 18.5 is considered underweight. · A BMI between 18.5 and 24.9 is considered healthy. · A BMI between 25 and 29.9 is considered overweight. A BMI of 30 or higher is considered obese. If your BMI is in the normal range, it means that you have a lower risk for weight-related health problems. If your BMI is in the overweight or obese range, you may be at increased risk for weight-related health problems, such as high blood pressure, heart disease, stroke, arthritis or joint pain, and diabetes. If your BMI is in the underweight range, you may be at increased risk for health problems such as fatigue, lower protection (immunity) against illness, muscle loss, bone loss, hair loss, and hormone problems. BMI is just one measure of your risk for weight-related health problems. You may be at higher risk for health problems if you are not active, you eat an unhealthy diet, or you drink too much alcohol or use tobacco products. Follow-up care is a key part of your treatment and safety. Be sure to make and go to all appointments, and call your doctor if you are having problems. It's also a good idea to know your test results and keep a list of the medicines you take. How can you care for yourself at home? · Practice healthy eating habits. This includes eating plenty of fruits, vegetables, whole grains, lean protein, and low-fat dairy. · If your doctor recommends it, get more exercise. Walking is a good choice. Bit by bit, increase the amount you walk every day. Try for at least 30 minutes on most days of the week. · Do not smoke. Smoking can increase your risk for health problems.  If you need help quitting, talk to your doctor about stop-smoking programs and medicines. These can increase your chances of quitting for good. · Limit alcohol to 2 drinks a day for men and 1 drink a day for women. Too much alcohol can cause health problems. If you have a BMI higher than 25 · Your doctor may do other tests to check your risk for weight-related health problems. This may include measuring the distance around your waist. A waist measurement of more than 40 inches in men or 35 inches in women can increase the risk of weight-related health problems. · Talk with your doctor about steps you can take to stay healthy or improve your health. You may need to make lifestyle changes to lose weight and stay healthy, such as changing your diet and getting regular exercise. If you have a BMI lower than 18.5 · Your doctor may do other tests to check your risk for health problems. · Talk with your doctor about steps you can take to stay healthy or improve your health. You may need to make lifestyle changes to gain or maintain weight and stay healthy, such as getting more healthy foods in your diet and doing exercises to build muscle. Where can you learn more? Go to http://mary-mildred.info/. Enter S176 in the search box to learn more about \"Body Mass Index: Care Instructions. \" Current as of: October 13, 2016 Content Version: 11.4 © 0985-6903 [employer]. Care instructions adapted under license by [employer] (which disclaims liability or warranty for this information). If you have questions about a medical condition or this instruction, always ask your healthcare professional. [address] any warranty or liability for your use of this information.

## 2019-06-24 NOTE — PROGRESS NOTES
HISTORY OF PRESENT ILLNESS    He weaned off paxil 2-3 months ago and is doing well and is less fatigued and feels better. Taking b12 pills daily     Hypothyroidism follow-up  Reports NO fatigue  denies heat/cold intolerance, bowel/skin changes or CVS symptoms, losing hair, feeling excessive energy, tremulousness, palpitations. Thyroid medication has been increased since last medication check and labs. Lab Results   Component Value Date/Time    TSH 3.250 03/25/2019 02:15 PM    T4, Free 1.14 09/24/2018 02:16 PM     Lost weight intentionally  Wt Readings from Last 3 Encounters:   06/24/19 218 lb 8 oz (99.1 kg)   03/25/19 232 lb 4 oz (105.3 kg)   12/18/18 229 lb (103.9 kg)     Impaired fasting glucose / Pre-diabetes follow-up  Lab Results   Component Value Date/Time    Glucose 145 (H) 09/24/2018 02:16 PM   Last hemoglobin a1c   Lab Results   Component Value Date/Time    Hemoglobin A1c 6.9 (H) 03/25/2019 02:15 PM   Diabetic diet compliance: compliant most of the time. Patient does not perform home glucose monitoring. Review of Systems   All other systems reviewed and are negative, except as noted in HPI    Past Medical and Surgical History   has a past medical history of Abdominal lymphadenopathy, Chronic anxiety, Coronary artery disease (4/11/2016), Degenerative joint disease (DJD) of lumbar spine, Environmental allergies, Essential tremor, FH: colon cancer, Hepatitis A, Hypospadias, Hypothyroidism (04/11/2016), IFG (impaired fasting glucose) (04/06/2016), Lymphadenopathy, Mononucleosis, Normal cardiac stress test (04/2016), Obstructive sleep apnea, Plantar fasciitis, Prostatic enlargement (12/6/2017), REM sleep behavior disorder, Screening for skin cancer, Septic shock ([facility] [address] (04/04/2002), Sleep apnea, Spinal stenosis of lumbar region at multiple levels (12/6/2017), and Trigger finger.    has a past surgical history that includes hx tonsillectomy; hx vasectomy; hx endoscopy (01/22/2002); hx lymphadenectomy (02/14/2002); hx knee arthroscopy (Left); hx colonoscopy (08/13/2013); hx orthopaedic (Right); and hx colonoscopy (11/27/2018). reports that he has never smoked. He has never used smokeless tobacco. He reports that he does not drink alcohol or use drugs. family history includes Cancer in his father and mother. Physical Exam   Nursing note and vitals reviewed. Blood pressure 107/51, pulse 60, temperature 97.9 °F (36.6 °C), temperature source Oral, resp. rate 18, height 6' 2\" (1.88 m), weight 218 lb 8 oz (99.1 kg), SpO2 97 %. Constitutional: In no distress. Eyes: Conjunctivae are normal.  HEENT:  No LAD or thyromegaly   Cardiovascular: Normal rate. regular rhythm. No murmurs  No edema  Pulmonary/Chest: Effort normal. clear to ausculation blaterally  Musculoskeletal:  no edema. Abd:  Neurological: Alert and oriented. Grossly intact cranial nerves and motor function. Skin: No rash noted. Psychiatric: Normal mood and affect. Behavior is normal.   Foot exam  - skin intact, mild dryness w no fissures, no rashes, no significant ulcers or callous formation. Sensation intact by microfilament or light touch      ASSESSMENT and PLAN  Diagnoses and all orders for this visit:    1. Acquired hypothyroidism - on higher dose. Fatigue is improved, but also stopped paxil  -     TSH 3RD GENERATION  -     T4, FREE  -     LIPID PANEL    2. B12 deficiency - taking oral b12 now  -     VITAMIN B12  -     METHYLMALONIC ACID    3. IFG (impaired fasting glucose)  The patient is asked to make an attempt to improve diet and exercise patterns to aid in medical management of this problem. Consider metformin, but he is working on diet, weight loss  -     HEMOGLOBIN A1C WITH EAG  -     LIPID PANEL    4. Thrombocytopenia (HCC -chronic.   Repeat and refer prn  -     CBC W/O DIFF      lab results and schedule of future lab studies reviewed with patient  reviewed medications and side effects in detail    Return to clinic for further evaluation if new symptoms develop or if current symptoms worsen or fail to resolve. Discussed the patient's BMI with him. The BMI follow up plan is as follows:     dietary management education, guidance, and counseling  encourage exercise  monitor weight  prescribed dietary intake    An After Visit Summary was printed and given to the patient.

## 2019-06-27 LAB
CHOLEST SERPL-MCNC: 124 MG/DL (ref 100–199)
ERYTHROCYTE [DISTWIDTH] IN BLOOD BY AUTOMATED COUNT: 14.5 % (ref 12.3–15.4)
EST. AVERAGE GLUCOSE BLD GHB EST-MCNC: 114 MG/DL
HBA1C MFR BLD: 5.6 % (ref 4.8–5.6)
HCT VFR BLD AUTO: 38.9 % (ref 37.5–51)
HDLC SERPL-MCNC: 51 MG/DL
HGB BLD-MCNC: 12.5 G/DL (ref 13–17.7)
INTERPRETATION, 910389: NORMAL
LDLC SERPL CALC-MCNC: 54 MG/DL (ref 0–99)
Lab: NORMAL
MCH RBC QN AUTO: 32 PG (ref 26.6–33)
MCHC RBC AUTO-ENTMCNC: 32.1 G/DL (ref 31.5–35.7)
MCV RBC AUTO: 100 FL (ref 79–97)
METHYLMALONATE SERPL-SCNC: 241 NMOL/L (ref 0–378)
PLATELET # BLD AUTO: 122 X10E3/UL (ref 150–450)
RBC # BLD AUTO: 3.91 X10E6/UL (ref 4.14–5.8)
T4 FREE SERPL-MCNC: 1.27 NG/DL (ref 0.82–1.77)
TRIGL SERPL-MCNC: 93 MG/DL (ref 0–149)
TSH SERPL DL<=0.005 MIU/L-ACNC: 0.11 UIU/ML (ref 0.45–4.5)
VIT B12 SERPL-MCNC: 484 PG/ML (ref 232–1245)
VLDLC SERPL CALC-MCNC: 19 MG/DL (ref 5–40)
WBC # BLD AUTO: 3.4 X10E3/UL (ref 3.4–10.8)

## 2019-07-01 DIAGNOSIS — N40.0 PROSTATIC ENLARGEMENT: ICD-10-CM

## 2019-07-01 RX ORDER — TAMSULOSIN HYDROCHLORIDE 0.4 MG/1
CAPSULE ORAL
Qty: 90 CAP | Refills: 0 | Status: SHIPPED | OUTPATIENT
Start: 2019-07-01 | End: 2019-09-30 | Stop reason: SDUPTHER

## 2019-09-26 ENCOUNTER — OFFICE VISIT (OUTPATIENT)
Dept: INTERNAL MEDICINE CLINIC | Age: 74
End: 2019-09-26

## 2019-09-26 VITALS
TEMPERATURE: 97.9 F | SYSTOLIC BLOOD PRESSURE: 124 MMHG | WEIGHT: 213 LBS | BODY MASS INDEX: 27.34 KG/M2 | HEIGHT: 74 IN | RESPIRATION RATE: 18 BRPM | OXYGEN SATURATION: 95 % | DIASTOLIC BLOOD PRESSURE: 66 MMHG | HEART RATE: 59 BPM

## 2019-09-26 DIAGNOSIS — N40.0 PROSTATIC ENLARGEMENT: ICD-10-CM

## 2019-09-26 DIAGNOSIS — J31.0 OTHER RHINITIS: ICD-10-CM

## 2019-09-26 DIAGNOSIS — E03.9 ACQUIRED HYPOTHYROIDISM: ICD-10-CM

## 2019-09-26 DIAGNOSIS — Z00.00 MEDICARE ANNUAL WELLNESS VISIT, SUBSEQUENT: Primary | ICD-10-CM

## 2019-09-26 DIAGNOSIS — H25.9 AGE-RELATED CATARACT OF BOTH EYES, UNSPECIFIED AGE-RELATED CATARACT TYPE: ICD-10-CM

## 2019-09-26 DIAGNOSIS — D69.6 THROMBOCYTOPENIA (HCC): ICD-10-CM

## 2019-09-26 DIAGNOSIS — G57.12 NEUROPATHIC PAIN INVOLVING LEFT LATERAL FEMORAL CUTANEOUS NERVE: ICD-10-CM

## 2019-09-26 DIAGNOSIS — Z87.898 HISTORY OF LYMPHADENOPATHY: ICD-10-CM

## 2019-09-26 DIAGNOSIS — D64.9 ANEMIA, UNSPECIFIED TYPE: ICD-10-CM

## 2019-09-26 RX ORDER — AZELASTINE 1 MG/ML
1 SPRAY, METERED NASAL 2 TIMES DAILY
Qty: 1 BOTTLE | Refills: 4 | Status: SHIPPED | OUTPATIENT
Start: 2019-09-26 | End: 2019-11-01 | Stop reason: ALTCHOICE

## 2019-09-26 NOTE — PATIENT INSTRUCTIONS
Well Visit, Over 72: Care Instructions Your Care Instructions Physical exams can help you stay healthy. Your doctor has checked your overall health and may have suggested ways to take good care of yourself. He or she also may have recommended tests. At home, you can help prevent illness with healthy eating, regular exercise, and other steps. Follow-up care is a key part of your treatment and safety. Be sure to make and go to all appointments, and call your doctor if you are having problems. It's also a good idea to know your test results and keep a list of the medicines you take. How can you care for yourself at home? · Reach and stay at a healthy weight. This will lower your risk for many problems, such as obesity, diabetes, heart disease, and high blood pressure. · Get at least 30 minutes of exercise on most days of the week. Walking is a good choice. You also may want to do other activities, such as running, swimming, cycling, or playing tennis or team sports. · Do not smoke. Smoking can make health problems worse. If you need help quitting, talk to your doctor about stop-smoking programs and medicines. These can increase your chances of quitting for good. · Protect your skin from too much sun. When you're outdoors from 10 a.m. to 4 p.m., stay in the shade or cover up with clothing and a hat with a wide brim. Wear sunglasses that block UV rays. Even when it's cloudy, put broad-spectrum sunscreen (SPF 30 or higher) on any exposed skin. · See a dentist one or two times a year for checkups and to have your teeth cleaned. · Wear a seat belt in the car. Follow your doctor's advice about when to have certain tests. These tests can spot problems early. For men and women · Cholesterol. Your doctor will tell you how often to have this done based on your overall health and other things that can increase your risk for heart attack and stroke. · Blood pressure. Have your blood pressure checked during a routine doctor visit. Your doctor will tell you how often to check your blood pressure based on your age, your blood pressure results, and other factors. · Diabetes. Ask your doctor whether you should have tests for diabetes. · Vision. Experts recommend that you have yearly exams for glaucoma and other age-related eye problems. · Hearing. Tell your doctor if you notice any change in your hearing. You can have tests to find out how well you hear. · Colon cancer tests. Keep having colon cancer tests as your doctor recommends. You can have one of several types of tests. · Heart attack and stroke risk. At least every 4 to 6 years, you should have your risk for heart attack and stroke assessed. Your doctor uses factors such as your age, blood pressure, cholesterol, and whether you smoke or have diabetes to show what your risk for a heart attack or stroke is over the next 10 years. · Osteoporosis. Talk to your doctor about whether you should have a bone density test to find out whether you have thinning bones. Also ask your doctor about whether you should take calcium and vitamin D supplements. For women · Pap test and pelvic exam. You may no longer need a Pap test. Talk with your doctor about whether to stop or continue to have Pap tests. · Breast exam and mammogram. Ask how often you should have a mammogram, which is an X-ray of your breasts. A mammogram can spot breast cancer before it can be felt and when it is easiest to treat. · Thyroid disease. Talk to your doctor about whether to have your thyroid checked as part of a regular physical exam. Women have an increased chance of a thyroid problem. For men · Prostate exam. Talk to your doctor about whether you should have a blood test (called a PSA test) for prostate cancer.  Experts recommend that you discuss the benefits and risks of the test with your doctor before you decide whether to have this test. Some experts say that men ages 79 and older no longer need testing. · Abdominal aortic aneurysm. Ask your doctor whether you should have a test to check for an aneurysm. You may need a test if you ever smoked or if your parent, brother, sister, or child has had an aneurysm. When should you call for help? Watch closely for changes in your health, and be sure to contact your doctor if you have any problems or symptoms that concern you. Where can you learn more? Go to http://mary-mildred.info/. Enter T632 in the search box to learn more about \"Well Visit, Over 65: Care Instructions. \" Current as of: December 13, 2018 Content Version: 12.2 © 1326-9923 [employer]. Care instructions adapted under license by [employer] (which disclaims liability or warranty for this information). If you have questions about a medical condition or this instruction, always ask your healthcare professional. [place] [address] any warranty or liability for your use of this information.

## 2019-09-27 NOTE — PROGRESS NOTES
This is a Subsequent Medicare Annual Wellness Visit providing Personalized Prevention Plan Services (PPPS) (Performed 12 months after initial AWV and PPPS )    I have reviewed the patient's medical history in detail and updated the computerized patient record. He is overall doing fairly well. Continues to have more energy since weaning off of Paxil. Left lateral thigh pain for about 1 month. Feels tingly. Mild pain. Ends just above lateral knee. Mild cough and mild sinus congestion. Is been using Rhinocort for the past couple of weeks with mild improvement. No epistaxis. No shortness of breath or wheezing. Hypothyroidism follow-up  Reports no fatigue  denies heat/cold intolerance, bowel/skin changes or CVS symptoms, losing hair, feeling excessive energy, tremulousness, palpitations. Thyroid medication has been unchanged since last medication check and labs. Lab Results   Component Value Date/Time    TSH 0.111 (L) 06/24/2019 02:57 PM    T4, Free 1.27 06/24/2019 02:57 PM     Continues to lose weight because he  says he is eating less food. Wt Readings from Last 3 Encounters:   09/26/19 213 lb (96.6 kg)   06/24/19 218 lb 8 oz (99.1 kg)   03/25/19 232 lb 4 oz (105.3 kg)     Was found to be mildly anemic on previous labs. He has a history of chronic mild thrombocytopenia. Denies any fevers, night sweats, blood in stool. Lab Results   Component Value Date/Time    WBC 3.4 06/24/2019 02:57 PM    HGB 12.5 (L) 06/24/2019 02:57 PM    HCT 38.9 06/24/2019 02:57 PM    PLATELET 286 (L) 70/99/6250 02:57 PM     (H) 06/24/2019 02:57 PM     Prostate symptoms are stable. He is taking finasteride and Flomax. Wakes up a couple of times per night. Mild urinary straining which is stable. History     Past Medical History:   Diagnosis Date    Abdominal lymphadenopathy 2002    Dr. Daniel.  lymph node enlargement near pancreas.   EUS biopsy 1/22/02 benign, but \"possible pneumonia\"    B12 deficiency     Cataract     Dr. Gina Abrams Chronic anxiety     Coronary artery disease 4/11/2016    A.  EBT (10/4/10):  LM 8, , LCx 0, . Total = 331.  Degenerative joint disease (DJD) of lumbar spine     MRI 3/2017 showes moderate disease and mild stenosis    Environmental allergies     Dr. Conrado Bales. also med allergies ibuprofen    Essential tremor     FH: colon cancer     mother    Hepatitis A     as child    Hypospadias     Hypothyroidism 04/11/2016    saw Dr. Fox Escobedo int he past    IFG (impaired fasting glucose) 04/06/2016    a1c 6.7%    Lymphadenopathy 2002    left axillary biopsy 2/14/02 - benign. Dr. Daniel, Dr. Brayan Islas Normal cardiac stress test 04/2016    Dr. William Lan Obstructive sleep apnea     on CPAP. Dr. Nataliya Polanco Plantar fasciitis     Dr. Epifanio Mabry.  hx R cortisone injection    Prostatic enlargement 12/6/2017    REM sleep behavior disorder     Screening for skin cancer     sees derm Dr. Consuelo Edge Septic shock ([facility] [address] 04/04/2002    JW    Sleep apnea     Spinal stenosis of lumbar region at multiple levels 12/6/2017    Thrombocytopenia (HCC)     Trigger finger     Dr. Nikolas Drake. s/p R surgery       Past Surgical History:   Procedure Laterality Date    HX COLONOSCOPY  08/13/2013    4/15/2002, 11/11/2009, 8/13/2013- Dr Mckenna Gerardo    HX COLONOSCOPY  11/27/2018    tubular adenoma    HX ENDOSCOPY  01/22/2002    Dr Mckenna Gerardo    HX KNEE ARTHROSCOPY Left     HX LYMPHADENECTOMY  02/14/2002    under left arm for biopsy    HX ORTHOPAEDIC Right     trigger finger    HX TONSILLECTOMY      HX VASECTOMY         Current Outpatient Medications   Medication Sig    azelastine (ASTELIN) 137 mcg (0.1 %) nasal spray 1 [place] by Both Nostrils route two (2) times a day.  Use in each nostril as directed    finasteride (PROSCAR) 5 mg tablet TAKE 1 TABLET BY MOUTH DAILY    tamsulosin (FLOMAX) 0.4 mg capsule TAKE ONE CAPSULE BY MOUTH DAILY    cyanocobalamin (VITAMIN B-12) 1,000 mcg tablet Take 1,000 mcg by mouth daily.  levothyroxine (SYNTHROID) 175 mcg tablet Take 1 Tab by mouth Daily (before breakfast).  clonazePAM (KLONOPIN) 0.5 mg tablet Take  by mouth nightly.  aspirin delayed-release 81 mg tablet Take 1 Tab by mouth daily.  multivitamin (ONE A DAY) tablet Take 1 Tab by mouth daily. No current facility-administered medications for this visit. Allergies   Allergen Reactions    Advil [Ibuprofen] Hives    Amitex Pse [Pseudoephedrine-Guaifenesin] Other (comments)     Dry mouth    Hydrocodone-Acetaminophen Rash, Itching and Swelling    Levaquin [Levofloxacin] Rash     Body aches       Family History   Problem Relation Age of Onset    Cancer Mother         colon    Cancer Father         lung        reports that he has never smoked. He has never used smokeless tobacco.   reports that he does not drink alcohol. Depression Risk Factor Screening:       Alcohol Risk Factor Screening: On any occasion during the past 3 months, have you had more than 3 drinks containing alcohol? No    Do you average more than 14 drinks per week? No      Functional Ability and Level of Safety:     Hearing Loss   mild    Activities of Daily Living   Self-care. Requires assistance with: no ADLs    Fall Risk     Fall Risk Assessment, last 12 mths 9/26/2019   Able to walk? Yes   Fall in past 12 months? No         Abuse Screen   Patient is not abused    Review of Systems   A comprehensive review of systems was negative except for that written in the HPI. Physical Examination     Evaluation of Cognitive Function:  Mood/affect:  neutral, happy  Appearance: age appropriate  Family member/caregiver input: none    Blood pressure 124/66, pulse (!) 59, temperature 97.9 °F (36.6 °C), temperature source Oral, resp. rate 18, height 6' 2\" (1.88 m), weight 213 lb (96.6 kg), SpO2 95 %.   General appearance: alert, cooperative, no distress, appears stated age  Neck: supple, symmetrical, trachea midline, no adenopathy, thyroid: not enlarged, symmetric, no tenderness/mass/nodules, no carotid bruit and no JVD  Lungs: clear to auscultation bilaterally  Heart: regular rate and rhythm, S1, S2 normal, no murmur, click, rub or gallop  Extremities: extremities normal, atraumatic, no cyanosis or edema    Patient Care Team:  Nael Umana MD as PCP - General (Internal Medicine)  Kyle Mares MD (Pulmonary Disease)      Advice/Referrals/Counseling   Education and counseling provided. See below for specific orders    Assessment/Plan   Diagnoses and all orders for this visit:    1. Medicare annual wellness visit, subsequent  He will have his influenza vaccine next month. Scheduled for ophthalmology exam.  declines Shingrix for now. 2. Acquired hypothyroidism  Perhaps is mildly overcontrolled. May need to decrease the dose. He is feeling well. No tremor or tachycardia. Weight is decreasing however. -     TSH 3RD GENERATION  -     T4, FREE  -     T3, FREE    3. Thrombocytopenia ([address]  This is been relatively chronic and stable. Monitor. No clear etiology. 4. Anemia, unspecified type  Anemia was a new finding was mild on last labs. If persists or worsens, needs more aggressive evaluation. Colonoscopy was done less than 1 year ago. Could consider endoscopy if iron deficient. Could consider referral to hematology for evaluation of anemia and thrombocytopenia. He has some distant history of lymphadenopathy of unspecific origin. Could consider abdominal pelvic and chest CT to evaluate for any suspicious nodes evaluate for lymphoma or leukemia if findings warrant. No B symptoms. He is losing some weight however. -     IRON PROFILE  -     CBC+PLATELET+HEM REVIEW  -     PROTEIN ELECTROPHORESIS W/ REFLX TIFFANIE  -     FERRITIN    5. History of lymphadenopathy    6. Prostatic enlargement  Stable symptoms on current medications. Deferred exam today. -     PSA W/ REFLX FREE PSA    7.  Other rhinitis  Cough. Mild. Postnasal drip appears to be the cause. Consider chest imaging if not improving.  -     azelastine (ASTELIN) 137 mcg (0.1 %) nasal spray; 1 [place] by Both Nostrils route two (2) times a day. Use in each nostril as directed    8. 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 This is mild. Monitor for now. 9. Age-related cataract of both eyes, unspecified age-related cataract type  Pending surgery. Nereyda Good Potential medication side effects were discussed with the patient; let me know if any occur.   Return for yearly Annual Wellness Visits

## 2019-09-30 LAB
ALBUMIN SERPL ELPH-MCNC: 3.3 G/DL (ref 2.9–4.4)
ALBUMIN/GLOB SERPL: 1.1 {RATIO} (ref 0.7–1.7)
ALPHA1 GLOB SERPL ELPH-MCNC: 0.2 G/DL (ref 0–0.4)
ALPHA2 GLOB SERPL ELPH-MCNC: 0.5 G/DL (ref 0.4–1)
B-GLOBULIN SERPL ELPH-MCNC: 1 G/DL (ref 0.7–1.3)
BASOPHILS # BLD MANUAL: 0 X10E3/UL (ref 0–0.2)
BASOPHILS NFR BLD MANUAL: 1 %
DIFFERENTIAL COMMENT, 115260: ABNORMAL
EOSINOPHIL # BLD MANUAL: 0.1 X10E3/UL (ref 0–0.4)
EOSINOPHIL NFR BLD MANUAL: 4 %
ERYTHROCYTE [DISTWIDTH] IN BLOOD BY AUTOMATED COUNT: 13 % (ref 12.3–15.4)
FERRITIN SERPL-MCNC: 31 NG/ML (ref 30–400)
GAMMA GLOB SERPL ELPH-MCNC: 1.4 G/DL (ref 0.4–1.8)
GLOBULIN SER CALC-MCNC: 3.1 G/DL (ref 2.2–3.9)
HCT VFR BLD AUTO: 37.6 % (ref 37.5–51)
HGB BLD-MCNC: 12.5 G/DL (ref 13–17.7)
IRON SATN MFR SERPL: 33 % (ref 15–55)
IRON SERPL-MCNC: 117 UG/DL (ref 38–169)
LYMPHOCYTES # BLD MANUAL: 1.3 X10E3/UL (ref 0.7–3.1)
LYMPHOCYTES NFR BLD MANUAL: 37 %
M PROTEIN SERPL ELPH-MCNC: NORMAL G/DL
MCH RBC QN AUTO: 31.6 PG (ref 26.6–33)
MCHC RBC AUTO-ENTMCNC: 33.2 G/DL (ref 31.5–35.7)
MCV RBC AUTO: 95 FL (ref 79–97)
MONOCYTES # BLD MANUAL: 0.5 X10E3/UL (ref 0.1–0.9)
MONOCYTES NFR BLD MANUAL: 15 %
NEUTROPHILS # BLD MANUAL: 1.5 X10E3/UL (ref 1.4–7)
NEUTROPHILS NFR BLD MANUAL: 43 %
PLATELET # BLD AUTO: 134 X10E3/UL (ref 150–450)
PLATELET BLD QL SMEAR: ABNORMAL
PLEASE NOTE, 011150: NORMAL
PROT PATTERN SERPL ELPH-IMP: NORMAL
PROT SERPL-MCNC: 6.4 G/DL (ref 6–8.5)
PSA SERPL-MCNC: 0.4 NG/ML (ref 0–4)
RBC # BLD AUTO: 3.95 X10E6/UL (ref 4.14–5.8)
RBC MORPH BLD: ABNORMAL
REFLEX CRITERIA: NORMAL
T3FREE SERPL-MCNC: 2.8 PG/ML (ref 2–4.4)
T4 FREE SERPL-MCNC: 1.41 NG/DL (ref 0.82–1.77)
TIBC SERPL-MCNC: 350 UG/DL (ref 250–450)
TSH SERPL DL<=0.005 MIU/L-ACNC: 0.06 UIU/ML (ref 0.45–4.5)
UIBC SERPL-MCNC: 233 UG/DL (ref 111–343)
WBC # BLD AUTO: 3.6 X10E3/UL (ref 3.4–10.8)

## 2019-10-01 RX ORDER — LEVOTHYROXINE SODIUM 150 UG/1
150 TABLET ORAL
Qty: 90 TAB | Refills: 1 | Status: SHIPPED | OUTPATIENT
Start: 2019-10-01 | End: 2020-04-26

## 2019-10-23 ENCOUNTER — TELEPHONE (OUTPATIENT)
Dept: INTERNAL MEDICINE CLINIC | Age: 74
End: 2019-10-23

## 2019-10-23 NOTE — TELEPHONE ENCOUNTER
Pt has a sore throat and wants to be seen today by Dr. Sharmin Dee only. Does not understand why Dr. Sharmin Dee has no openings until 10/31/19. Does not want to see any other provider. Wants to be fit in. Thanks.

## 2019-10-23 NOTE — TELEPHONE ENCOUNTER
Pt still has not heard back from practice regarding an appointment for today. Willing to see np, if possible. Asking to be seen today as he has a sore throat and noticed a sore spot in mouth. Thanks.

## 2019-10-24 ENCOUNTER — OFFICE VISIT (OUTPATIENT)
Dept: INTERNAL MEDICINE CLINIC | Age: 74
End: 2019-10-24

## 2019-10-24 VITALS
BODY MASS INDEX: 27.21 KG/M2 | SYSTOLIC BLOOD PRESSURE: 130 MMHG | HEART RATE: 62 BPM | HEIGHT: 74 IN | DIASTOLIC BLOOD PRESSURE: 74 MMHG | RESPIRATION RATE: 18 BRPM | TEMPERATURE: 97.8 F | OXYGEN SATURATION: 94 % | WEIGHT: 212 LBS

## 2019-10-24 DIAGNOSIS — J02.9 VIRAL PHARYNGITIS: Primary | ICD-10-CM

## 2019-10-24 DIAGNOSIS — K12.1 ORAL ULCER: ICD-10-CM

## 2019-10-24 RX ORDER — KETOROLAC TROMETHAMINE 5 MG/ML
SOLUTION OPHTHALMIC
Refills: 1 | COMMUNITY
Start: 2019-10-07 | End: 2020-09-24 | Stop reason: ALTCHOICE

## 2019-10-24 RX ORDER — POLYMYXIN B SULFATE AND TRIMETHOPRIM 1; 10000 MG/ML; [USP'U]/ML
SOLUTION OPHTHALMIC
Refills: 1 | COMMUNITY
Start: 2019-10-07 | End: 2020-09-24 | Stop reason: ALTCHOICE

## 2019-10-24 RX ORDER — PREDNISOLONE ACETATE 10 MG/ML
SUSPENSION/ DROPS OPHTHALMIC
Refills: 1 | COMMUNITY
Start: 2019-10-07 | End: 2020-09-24 | Stop reason: ALTCHOICE

## 2019-10-24 NOTE — PATIENT INSTRUCTIONS
Canker Sore: Care Instructions Your Care Instructions Canker sores are painful white sores in the mouth. They usually begin with a tingling feeling, followed by a red spot or bump that turns white. Canker sores appear most often on the tongue, inside the cheeks, and inside the lips. They can be very painful and can make talking, eating, and drinking difficult. A canker sore may form after an injury or stretching of tissues in the mouth, which can happen, for example, during a dental procedure or teeth cleaning. If you accidentally bite your tongue or the inside of your cheek, you may end up with a canker sore. Other possible causes are infection, certain foods, and stress. Canker sores are not contagious. The pain from your canker sore should decrease in 7 to 10 days, and it should heal completely in 1 to 3 weeks. In most cases, a canker sore will go away by itself. Home treatment can ease pain and discomfort. If you have a large or deep canker sore that does not seem to be getting better after 2 weeks, your doctor may prescribe medicine. Canker sores often come back again. Follow-up care is a key part of your treatment and safety. Be sure to make and go to all appointments, and call your doctor if you are having problems. It's also a good idea to know your test results and keep a list of the medicines you take. How can you care for yourself at home? · Drink cold liquids, such as water or iced tea, or eat flavored ice pops or frozen juices. Use a straw to keep the liquid from coming in contact with your canker sore. · Eat soft, bland foods that are easy to chew and swallow, such as ice cream, custard, applesauce, cottage cheese, macaroni and cheese, soft-cooked eggs, yogurt, or cream soups. · Cut foods into small pieces, or grind, mash, blend, or puree foods to make them easier to chew and swallow.  
· While your canker sore heals, avoid coffee, chocolate, spicy and salty foods, citrus fruits, nuts, seeds, and tomatoes. · To soothe your canker sore and help it heal: ? Use an over-the-counter numbing medicine, such as Orabase or Anbesol. ? Dab a bit of Milk of Magnesia on the canker sore 3 or 4 times a day. · Put ice on your sore to reduce the pain. · Take anti-inflammatory medicines to reduce pain, as needed. These include ibuprofen (Advil, Motrin) and naproxen (Aleve). Read and follow all instructions on the label. · Use a soft-bristle toothbrush, and brush your teeth well but carefully. · Do not smoke or use spit tobacco. Tobacco can cause mouth problems and slow healing. If you need help quitting, talk to your doctor about stop-smoking programs and medicines. These can increase your chances of quitting for good. When should you call for help? Call your doctor now or seek immediate medical care if: 
  · You have signs of infection, such as: 
? Increased pain, swelling, warmth, or redness. ? Red streaks leading from the area. ? Pus draining from the area. ? A fever.  
 Watch closely for changes in your health, and be sure to contact your doctor if: 
  · You do not get better as expected. Where can you learn more? Go to http://mary-mildred.info/. Enter Z575 in the search box to learn more about \"Canker Sore: Care Instructions. \" Current as of: October 3, 2018 Content Version: 12.2 © 8307-3762 [employer]. Care instructions adapted under license by [employer] (which disclaims liability or warranty for this information). If you have questions about a medical condition or this instruction, always ask your healthcare professional. [address] any warranty or liability for your use of this information.

## 2019-10-24 NOTE — PROGRESS NOTES
HISTORY OF PRESENT ILLNESS    Chief Complaint   Patient presents with    Sore Throat     drainage and sore spot on roof of mouth, throat started yesterday, no body aches or chills, drainage started a while ago       Presents with sore throat and a lesion on the top of his mouth for 1 days  Associated symptoms include: Swollen left submandibular lymph node  He has chronic rhinitis which has not changed. Denies any fevers, chills, shortness of breath, cough     Treatments tried include: None    Review of Systems   All other systems reviewed and are negative, except as noted in HPI    Past Medical and Surgical History   has a past medical history of Abdominal lymphadenopathy (2002), B12 deficiency, Cataract, Chronic anxiety, Coronary artery disease (4/11/2016), Degenerative joint disease (DJD) of lumbar spine, Environmental allergies, Essential tremor, FH: colon cancer, Hepatitis A, Hypospadias, Hypothyroidism (04/11/2016), IFG (impaired fasting glucose) (04/06/2016), Lymphadenopathy (2002), Mononucleosis, Normal cardiac stress test (04/2016), Obstructive sleep apnea, Plantar fasciitis, Prostatic enlargement (12/6/2017), REM sleep behavior disorder, Screening for skin cancer, Septic shock ([facility] [address] (04/04/2002), Sleep apnea, Spinal stenosis of lumbar region at multiple levels (12/6/2017), Thrombocytopenia ([facility] [address] and Trigger finger. has a past surgical history that includes hx tonsillectomy; hx vasectomy; hx endoscopy (01/22/2002); hx lymphadenectomy (02/14/2002); hx knee arthroscopy (Left); hx colonoscopy (08/13/2013); hx orthopaedic (Right); and hx colonoscopy (11/27/2018). reports that he has never smoked. He has never used smokeless tobacco. He reports that he does not drink alcohol or use drugs. family history includes Cancer in his father and mother. Physical Exam   Nursing note and vitals reviewed. Blood pressure 130/74, pulse 62, temperature 97.8 °F (36.6 °C), temperature source Oral, resp.  rate 18, height 6' 2\" (1.88 m), weight 212 lb (96.2 kg), SpO2 94 %. Constitutional: In no distress. Eyes: Conjunctivae are normal.  3 mm ulceration on the roof of his mouth. No erythema throat. Nasal mucosa are blue and purple. HEENT: Mild lymphadenopathy submandibular on left. Or thyromegaly   Cardiovascular: Normal rate. regular rhythm. No murmurs  No edema  Pulmonary/Chest: Effort normal. clear to ausculation blaterally  Musculoskeletal:  no edema. Abd:  Neurological: Alert and oriented. Grossly intact cranial nerves and motor function. Skin: No rash noted. Psychiatric: Normal mood and affect. Behavior is normal.     ASSESSMENT and PLAN  Diagnoses and all orders for this visit:    1. Viral pharyngitis  2. Oral ulcer  Reassured. This is a viral ulcer. Reactive submandibular lymphadenopathy. Rest.  Symptoms likely will worsen over the next 48 hours and hopefully improve. No evidence of strep or other bacterial illness. Try over-the-counter therapies as below. Chronic rhinitis. lab results and schedule of future lab studies reviewed with patient  reviewed medications and side effects in detail    Return to clinic for further evaluation if new symptoms develop or if current symptoms worsen or fail to resolve. Patient Instructions          Canker Sore: Care Instructions  Your Care Instructions  Canker sores are painful white sores in the mouth. They usually begin with a tingling feeling, followed by a red spot or bump that turns white. Canker sores appear most often on the tongue, inside the cheeks, and inside the lips. They can be very painful and can make talking, eating, and drinking difficult. A canker sore may form after an injury or stretching of tissues in the mouth, which can happen, for example, during a dental procedure or teeth cleaning. If you accidentally bite your tongue or the inside of your cheek, you may end up with a canker sore.  Other possible causes are infection, certain foods, and stress. Canker sores are not contagious. The pain from your canker sore should decrease in 7 to 10 days, and it should heal completely in 1 to 3 weeks. In most cases, a canker sore will go away by itself. Home treatment can ease pain and discomfort. If you have a large or deep canker sore that does not seem to be getting better after 2 weeks, your doctor may prescribe medicine. Canker sores often come back again. Follow-up care is a key part of your treatment and safety. Be sure to make and go to all appointments, and call your doctor if you are having problems. It's also a good idea to know your test results and keep a list of the medicines you take. How can you care for yourself at home? · Drink cold liquids, such as water or iced tea, or eat flavored ice pops or frozen juices. Use a straw to keep the liquid from coming in contact with your canker sore. · Eat soft, bland foods that are easy to chew and swallow, such as ice cream, custard, applesauce, cottage cheese, macaroni and cheese, soft-cooked eggs, yogurt, or cream soups. · Cut foods into small pieces, or grind, mash, blend, or puree foods to make them easier to chew and swallow. · While your canker sore heals, avoid coffee, chocolate, spicy and salty foods, citrus fruits, nuts, seeds, and tomatoes. · To soothe your canker sore and help it heal:  ? Use an over-the-counter numbing medicine, such as Orabase or Anbesol. ? Dab a bit of Milk of Magnesia on the canker sore 3 or 4 times a day. · Put ice on your sore to reduce the pain. · Take anti-inflammatory medicines to reduce pain, as needed. These include ibuprofen (Advil, Motrin) and naproxen (Aleve). Read and follow all instructions on the label. · Use a soft-bristle toothbrush, and brush your teeth well but carefully. · Do not smoke or use spit tobacco. Tobacco can cause mouth problems and slow healing.  If you need help quitting, talk to your doctor about stop-smoking programs and medicines. These can increase your chances of quitting for good. When should you call for help? Call your doctor now or seek immediate medical care if:    · You have signs of infection, such as:  ? Increased pain, swelling, warmth, or redness. ? Red streaks leading from the area. ? Pus draining from the area. ? A fever.    Watch closely for changes in your health, and be sure to contact your doctor if:    · You do not get better as expected. Where can you learn more? Go to http://mary-mildred.info/. Enter H855 in the search box to learn more about \"Canker Sore: Care Instructions. \"  Current as of: October 3, 2018  Content Version: 12.2  © 1828-3783 [employer]. Care instructions adapted under license by [employer] (which disclaims liability or warranty for this information). If you have questions about a medical condition or this instruction, always ask your healthcare professional. [place] [address] any warranty or liability for your use of this information.

## 2019-11-01 ENCOUNTER — OFFICE VISIT (OUTPATIENT)
Dept: INTERNAL MEDICINE CLINIC | Age: 74
End: 2019-11-01

## 2019-11-01 VITALS
TEMPERATURE: 98 F | SYSTOLIC BLOOD PRESSURE: 117 MMHG | OXYGEN SATURATION: 95 % | HEART RATE: 61 BPM | BODY MASS INDEX: 27.21 KG/M2 | WEIGHT: 212 LBS | RESPIRATION RATE: 18 BRPM | HEIGHT: 74 IN | DIASTOLIC BLOOD PRESSURE: 70 MMHG

## 2019-11-01 DIAGNOSIS — H25.9 AGE-RELATED CATARACT OF BOTH EYES, UNSPECIFIED AGE-RELATED CATARACT TYPE: Primary | ICD-10-CM

## 2019-11-03 NOTE — PROGRESS NOTES
HISTORY OF PRESENT ILLNESS  Nisha Carey is a 66 y.o. male. HPI  Nisha Carey was referred for evaluation by:Dr. Talon Connolly for Pre- Op Evaluation. Please see encounter details and orders for consultative summary. Type of surgery : cataract  Surgery site : bilateral  Anesthesia type: MAC  Date of procedure:  x2    Allergies: Allergies   Allergen Reactions    Advil [Ibuprofen] Hives    Amitex Pse [Pseudoephedrine-Guaifenesin] Other (comments)     Dry mouth    Hydrocodone-Acetaminophen Rash, Itching and Swelling    Levaquin [Levofloxacin] Rash     Body aches     Latex allergy: no  Prior reactions to anesthesia:  None    Functional status:  he is able to ambulate up 2 flights of step with no shortness of breath, chest pain  Prior cardiac evaluation:  Stress test 4/2016    Current Outpatient Medications   Medication Sig    ketorolac (ACULAR) 0.5 % ophthalmic solution Pre and post surgery    trimethoprim-polymyxin b (POLYTRIM) ophthalmic solution Pre and post surgery    prednisoLONE acetate (PRED FORTE) 1 % ophthalmic suspension Pre and post surgery    levothyroxine (SYNTHROID) 150 mcg tablet Take 1 Tab by mouth Daily (before breakfast). Decreased 10/1/19    tamsulosin (FLOMAX) 0.4 mg capsule TAKE ONE CAPSULE BY MOUTH DAILY    finasteride (PROSCAR) 5 mg tablet TAKE 1 TABLET BY MOUTH DAILY    cyanocobalamin (VITAMIN B-12) 1,000 mcg tablet Take 1,000 mcg by mouth daily.  clonazePAM (KLONOPIN) 0.5 mg tablet Take  by mouth nightly.  aspirin delayed-release 81 mg tablet Take 1 Tab by mouth daily.  multivitamin (ONE A DAY) tablet Take 1 Tab by mouth daily. No current facility-administered medications for this visit. Past Medical History:   Diagnosis Date    Abdominal lymphadenopathy 2002    Dr. Daniel.  lymph node enlargement near pancreas.   EUS biopsy 1/22/02 benign, but \"possible pneumonia\"    B12 deficiency     Cataract     Dr. Vimal Cook Chronic anxiety     Coronary artery disease 4/11/2016    A.  EBT (10/4/10):  LM 8, , LCx 0, . Total = 331.  Degenerative joint disease (DJD) of lumbar spine     MRI 3/2017 showes moderate disease and mild stenosis    Environmental allergies     Dr. Eduar Sainz. also med allergies ibuprofen    Essential tremor     FH: colon cancer     mother    Hepatitis A     as child    Hypospadias     Hypothyroidism 04/11/2016    saw Dr. Erinn Turpin int he past    IFG (impaired fasting glucose) 04/06/2016    a1c 6.7%    Lymphadenopathy 2002    left axillary biopsy 2/14/02 - benign. Dr. Daniel, Dr. Renee Garcia Normal cardiac stress test 04/2016    Dr. Senthil Holley Obstructive sleep apnea     on CPAP. Dr. Gilmar Maldonado Plantar fasciitis     Dr. Charles Hutchinson.  hx R cortisone injection    Prostatic enlargement 12/6/2017    REM sleep behavior disorder     Screening for skin cancer     sees derm Dr. Eran Keys Septic shock ([facility] [address] 04/04/2002    JW    Sleep apnea     Spinal stenosis of lumbar region at multiple levels 12/6/2017    Thrombocytopenia (HCC)     Trigger finger     Dr. Marianne Henderson. s/p R surgery     Past Surgical History:   Procedure Laterality Date    HX CATARACT REMOVAL Bilateral 11/2019    Dr. Sergio Wheeler HX COLONOSCOPY  08/13/2013    4/15/2002, 11/11/2009, 8/13/2013- Dr Yrn Delgado    HX COLONOSCOPY  11/27/2018    tubular adenoma    HX ENDOSCOPY  01/22/2002    Dr Nafisa Luevano ARTHROSCOPY Left     HX LYMPHADENECTOMY  02/14/2002    mediastinoscopy, under left arm for biopsy    HX ORTHOPAEDIC Right     trigger finger    HX TONSILLECTOMY      HX VASECTOMY       Social History     Tobacco Use    Smoking status: Never Smoker    Smokeless tobacco: Never Used   Substance Use Topics    Alcohol use: No     Alcohol/week: 0.0 standard drinks    Drug use: No       Blood pressure 117/70, pulse 61, temperature 98 °F (36.7 °C), temperature source Oral, resp.  rate 18, height 6' 2\" (1.88 m), weight 212 lb (96.2 kg), SpO2 95 %.      Review of Systems   Respiratory: Positive for cough. Negative for hemoptysis, sputum production, shortness of breath and wheezing. All other systems reviewed and are negative. Physical Exam   Constitutional: He is oriented to person, place, and time. He appears well-developed and well-nourished. No distress. Cardiovascular: Normal rate. Pulmonary/Chest: Effort normal.   Musculoskeletal: He exhibits no edema. Neurological: He is alert and oriented to person, place, and time. Psychiatric: He has a normal mood and affect. His behavior is normal. Judgment and thought content normal.   Nursing note and vitals reviewed. ASSESSMENT and PLAN  Diagnoses and all orders for this visit:    1. Age-related cataract of both eyes, unspecified age-related cataract type  Patient is in stable condition and of average, acceptable risk for the proposed surgery. Thank you for the consultation.     Fax to Dr. Rufina Woods

## 2019-11-08 ENCOUNTER — TELEPHONE (OUTPATIENT)
Dept: INTERNAL MEDICINE CLINIC | Age: 74
End: 2019-11-08

## 2019-11-08 NOTE — TELEPHONE ENCOUNTER
Pt states [facility] has not received the completed preop form and will need it to be faxed to [phone] today. Thanks.

## 2019-11-29 ENCOUNTER — HOSPITAL ENCOUNTER (OUTPATIENT)
Dept: GENERAL RADIOLOGY | Age: 74
Discharge: HOME | End: 2019-11-29
Attending: INTERNAL MEDICINE
Payer: MEDICARE

## 2019-11-29 ENCOUNTER — OFFICE VISIT (OUTPATIENT)
Dept: INTERNAL MEDICINE CLINIC | Age: 74
End: 2019-11-29

## 2019-11-29 VITALS
RESPIRATION RATE: 16 BRPM | BODY MASS INDEX: 27.21 KG/M2 | WEIGHT: 212 LBS | DIASTOLIC BLOOD PRESSURE: 62 MMHG | OXYGEN SATURATION: 98 % | HEIGHT: 74 IN | SYSTOLIC BLOOD PRESSURE: 114 MMHG | TEMPERATURE: 98 F | HEART RATE: 64 BPM

## 2019-11-29 DIAGNOSIS — R05.9 COUGH: ICD-10-CM

## 2019-11-29 DIAGNOSIS — R05.9 COUGH: Primary | ICD-10-CM

## 2019-11-29 PROCEDURE — 71046 X-RAY EXAM CHEST 2 VIEWS: CPT

## 2019-11-29 RX ORDER — AZELASTINE HCL 205.5 UG/1
SPRAY NASAL 2 TIMES DAILY
COMMUNITY
End: 2020-09-24 | Stop reason: ALTCHOICE

## 2019-11-29 NOTE — PROGRESS NOTES
HISTORY OF PRESENT ILLNESS  Nisha Carey is a 66 y.o. male. HPI   He has been coughing and sneezing and has been going on for a couple of months and he tried nasal spray last night and that was just one night- it has been going on for a couple of months they have been in new house in 5 years ; same as his wife he has been coughing for a couple of months ; no fever or chills, no indigestion ; he had tried allergy pills and not helping     No new pets ; not a history of smoking, no systemic signs or symptoms ; no increase in cough at night or associated with foods    Review of Systems   Constitutional: Negative. Negative for chills, diaphoresis, fever, malaise/fatigue and weight loss. HENT: Negative for congestion, nosebleeds and tinnitus. Eyes: Negative for blurred vision, double vision and photophobia. Respiratory: Negative for cough, hemoptysis, sputum production, shortness of breath and wheezing. Cardiovascular: Negative for chest pain, palpitations, orthopnea, claudication, leg swelling and PND. Gastrointestinal: Negative for abdominal pain, blood in stool, constipation, diarrhea, heartburn, melena, nausea and vomiting. Genitourinary: Negative for dysuria, frequency, hematuria and urgency. Musculoskeletal: Negative for back pain, joint pain, myalgias and neck pain. Skin: Negative for itching and rash. Neurological: Negative for dizziness, tingling, sensory change, speech change, focal weakness, weakness and headaches. Endo/Heme/Allergies: Negative for polydipsia. Does not bruise/bleed easily. Psychiatric/Behavioral: Negative for depression. The patient is not nervous/anxious and does not have insomnia. Physical Exam  Constitutional:       Appearance: He is well-developed. HENT:      Head: Normocephalic and atraumatic.       Right Ear: External ear normal.      Left Ear: External ear normal.      Nose: Nose normal.   Eyes:      Conjunctiva/sclera: Conjunctivae normal. Pupils: Pupils are equal, round, and reactive to light. Neck:      Musculoskeletal: Normal range of motion and neck supple. Thyroid: No thyromegaly. Vascular: No carotid bruit, hepatojugular reflux or JVD. Cardiovascular:      Rate and Rhythm: Normal rate and regular rhythm. Heart sounds: Normal heart sounds. Pulmonary:      Effort: Pulmonary effort is normal.      Breath sounds: Normal breath sounds. Abdominal:      General: Bowel sounds are normal.      Palpations: Abdomen is soft. Musculoskeletal: Normal range of motion. Skin:     General: Skin is warm and dry. Neurological:      Mental Status: He is alert and oriented to person, place, and time. Psychiatric:         Behavior: Behavior normal.         Thought Content: Thought content normal.         Judgment: Judgment normal.         ASSESSMENT and PLAN  Diagnoses and all orders for this visit:    1. Cough- no fever or chills, no sob and no indigestion at all or sour taste ; just a cough but same as wife- we are going to wait and see what allergist says for his wife and he will make an appt with    -     XR CHEST PA LAT; Future  Over 50% of the 25 minutes face to face with Felicitasshawn Tj consisted of counseling and/or discussing treatment plans in reference to his cough and cause. This note will not be viewable in [address]         lab results and schedule of future lab studies reviewed with patient  reviewed diet, exercise and weight control  cardiovascular risk and specific lipid/LDL goals reviewed  reviewed medications and side effects in detail

## 2019-12-16 ENCOUNTER — HOSPITAL ENCOUNTER (OUTPATIENT)
Dept: LAB | Age: 74
Discharge: HOME | End: 2019-12-16

## 2019-12-16 ENCOUNTER — OFFICE VISIT (OUTPATIENT)
Dept: INTERNAL MEDICINE CLINIC | Age: 74
End: 2019-12-16

## 2019-12-16 VITALS
SYSTOLIC BLOOD PRESSURE: 132 MMHG | HEIGHT: 74 IN | OXYGEN SATURATION: 94 % | RESPIRATION RATE: 18 BRPM | HEART RATE: 70 BPM | WEIGHT: 217 LBS | BODY MASS INDEX: 27.85 KG/M2 | DIASTOLIC BLOOD PRESSURE: 71 MMHG | TEMPERATURE: 97.4 F

## 2019-12-16 DIAGNOSIS — I73.9 PERIPHERAL VASCULAR DISEASE (HCC): ICD-10-CM

## 2019-12-16 DIAGNOSIS — E03.9 ACQUIRED HYPOTHYROIDISM: ICD-10-CM

## 2019-12-16 DIAGNOSIS — M76.62 TENDONITIS, ACHILLES, LEFT: ICD-10-CM

## 2019-12-16 DIAGNOSIS — D69.6 THROMBOCYTOPENIA (HCC): ICD-10-CM

## 2019-12-16 DIAGNOSIS — E03.9 ACQUIRED HYPOTHYROIDISM: Primary | ICD-10-CM

## 2019-12-16 DIAGNOSIS — D64.9 ANEMIA, UNSPECIFIED TYPE: ICD-10-CM

## 2019-12-16 DIAGNOSIS — R05.9 COUGH: ICD-10-CM

## 2019-12-16 LAB
BASOPHILS # BLD: 0 K/UL (ref 0–0.1)
BASOPHILS NFR BLD: 1 % (ref 0–1)
DIFFERENTIAL METHOD BLD: ABNORMAL
EOSINOPHIL # BLD: 0.2 K/UL (ref 0–0.4)
EOSINOPHIL NFR BLD: 5 % (ref 0–7)
ERYTHROCYTE [DISTWIDTH] IN BLOOD BY AUTOMATED COUNT: 14.6 % (ref 11.5–14.5)
HCT VFR BLD AUTO: 38.7 % (ref 36.6–50.3)
HGB BLD-MCNC: 12.5 G/DL (ref 12.1–17)
IMM GRANULOCYTES # BLD AUTO: 0 K/UL (ref 0–0.04)
IMM GRANULOCYTES NFR BLD AUTO: 0 % (ref 0–0.5)
LYMPHOCYTES # BLD: 1 K/UL (ref 0.8–3.5)
LYMPHOCYTES NFR BLD: 29 % (ref 12–49)
MCH RBC QN AUTO: 30.7 PG (ref 26–34)
MCHC RBC AUTO-ENTMCNC: 32.3 G/DL (ref 30–36.5)
MCV RBC AUTO: 95.1 FL (ref 80–99)
MONOCYTES # BLD: 0.6 K/UL (ref 0–1)
MONOCYTES NFR BLD: 17 % (ref 5–13)
NEUTS SEG # BLD: 1.7 K/UL (ref 1.8–8)
NEUTS SEG NFR BLD: 48 % (ref 32–75)
NRBC # BLD: 0 K/UL (ref 0–0.01)
NRBC BLD-RTO: 0 PER 100 WBC
PLATELET # BLD AUTO: 130 K/UL (ref 150–400)
PMV BLD AUTO: 10.7 FL (ref 8.9–12.9)
RBC # BLD AUTO: 4.07 M/UL (ref 4.1–5.7)
T4 FREE SERPL-MCNC: 0.9 NG/DL (ref 0.8–1.5)
TSH SERPL DL<=0.05 MIU/L-ACNC: 3.02 UIU/ML (ref 0.36–3.74)
WBC # BLD AUTO: 3.6 K/UL (ref 4.1–11.1)

## 2019-12-16 RX ORDER — MONTELUKAST SODIUM 10 MG/1
10 TABLET ORAL DAILY
Qty: 30 TAB | Refills: 2 | Status: SHIPPED | OUTPATIENT
Start: 2019-12-16 | End: 2020-05-11

## 2019-12-17 NOTE — PROGRESS NOTES
HISTORY OF PRESENT ILLNESS    Chief Complaint   Patient presents with    Thyroid Problem     f/u on meds    Foot Pain     in left heel, for a while    URI     cough and drainage continuing       Presents for follow-up    Hypothyroidism follow-up  Reports no fatigue  denies heat/cold intolerance, bowel/skin changes or CVS symptoms, losing hair, feeling excessive energy, tremulousness, palpitations. Thyroid medication has been decreased since last medication check and labs. Lab Results   Component Value Date/Time    TSH 3.02 12/16/2019 02:10 PM    Triiodothyronine (T3), free 2.8 09/26/2019 02:14 PM    T4, Free 0.9 12/16/2019 02:10 PM     Wt Readings from Last 3 Encounters:   12/16/19 217 lb (98.4 kg)   11/29/19 212 lb (96.2 kg)   11/01/19 212 lb (96.2 kg)         Review of Systems   All other systems reviewed and are negative, except as noted in HPI    Past Medical and Surgical History   has a past medical history of Abdominal lymphadenopathy (2002), B12 deficiency, Cataract, Chronic anxiety, Coronary artery disease (4/11/2016), Degenerative joint disease (DJD) of lumbar spine, Environmental allergies, Essential tremor, FH: colon cancer, Hepatitis A, Hypospadias, Hypothyroidism (04/11/2016), IFG (impaired fasting glucose) (04/06/2016), Lymphadenopathy (2002), Mononucleosis, Normal cardiac stress test (04/2016), Obstructive sleep apnea, Plantar fasciitis, Prostatic enlargement (12/6/2017), REM sleep behavior disorder, Screening for skin cancer, Septic shock ([facility] [address] (04/04/2002), Sleep apnea, Spinal stenosis of lumbar region at multiple levels (12/6/2017), Thrombocytopenia ([facility] [address] and Trigger finger. has a past surgical history that includes hx tonsillectomy; hx vasectomy; hx endoscopy (01/22/2002); hx lymphadenectomy (02/14/2002); hx knee arthroscopy (Left); hx colonoscopy (08/13/2013); hx orthopaedic (Right); hx colonoscopy (11/27/2018); and hx cataract removal (Bilateral, 11/2019).      reports that he has never smoked. He has never used smokeless tobacco. He reports that he does not drink alcohol or use drugs. family history includes Cancer in his father and mother. Physical Exam   Nursing note and vitals reviewed. Blood pressure 132/71, pulse 70, temperature 97.4 °F (36.3 °C), temperature source Oral, resp. rate 18, height 6' 2\" (1.88 m), weight 217 lb (98.4 kg), SpO2 94 %. Constitutional:  No distress. Eyes: Conjunctivae are normal.   Ears:  Hearing grossly intact  Cardiovascular: Normal rate. regular rhythm, no murmurs or gallops  No edema  Pulmonary/Chest: Effort normal.   CTAB  Musculoskeletal: moves all 4 extremities   Neurological: Alert and oriented to person, place, and time. Skin: No rash noted. Psychiatric: Normal mood and affect. Behavior is normal.     ASSESSMENT and PLAN  Diagnoses and all orders for this visit:    1. Acquired hypothyroidism  Controlled on current regimen. Continue current medications as written in chart.  -     TSH 3RD GENERATION; Future  -     T4, FREE; Future    2. Anemia, unspecified type  Unclear etiology. May refer if persists. -     CBC WITH AUTOMATED DIFF; Future    3. Thrombocytopenia (HCC)  -     CBC WITH AUTOMATED DIFF; Future    4. Cough  Perhaps reactive airways, allergic?  try  -     montelukast (SINGULAIR) 10 mg tablet; Take 1 Tab by mouth daily. Indications: inflammation of the nose due to an allergy    5. Tendonitis, Achilles, left  Stretching exercises demonstrated for for this condition    6. Peripheral vascular disease ([facility] [address]  Currently asymptomatic  Exercise. There are no Patient Instructions on file for this visit.    lab results and schedule of future lab studies reviewed with patient  reviewed medications and side effects in detail    Return to clinic for further evaluation if new symptoms develop        Current Outpatient Medications   Medication Sig    montelukast (SINGULAIR) 10 mg tablet Take 1 Tab by mouth daily.  Indications: inflammation of the nose due to an allergy    azelastine (ASTEPRO) 0.15 % (205.5 mcg) two (2) times a day.  ketorolac (ACULAR) 0.5 % ophthalmic solution Pre and post surgery    prednisoLONE acetate (PRED FORTE) 1 % ophthalmic suspension Pre and post surgery    levothyroxine (SYNTHROID) 150 mcg tablet Take 1 Tab by mouth Daily (before breakfast). Decreased 10/1/19    tamsulosin (FLOMAX) 0.4 mg capsule TAKE ONE CAPSULE BY MOUTH DAILY    finasteride (PROSCAR) 5 mg tablet TAKE 1 TABLET BY MOUTH DAILY    cyanocobalamin (VITAMIN B-12) 1,000 mcg tablet Take 1,000 mcg by mouth daily.  clonazePAM (KLONOPIN) 0.5 mg tablet Take  by mouth nightly.  aspirin delayed-release 81 mg tablet Take 1 Tab by mouth daily.  multivitamin (ONE A DAY) tablet Take 1 Tab by mouth daily.  trimethoprim-polymyxin b (POLYTRIM) ophthalmic solution Pre and post surgery     No current facility-administered medications for this visit.

## 2020-02-19 DIAGNOSIS — N40.0 PROSTATIC ENLARGEMENT: ICD-10-CM

## 2020-02-19 RX ORDER — FINASTERIDE 5 MG/1
TABLET, FILM COATED ORAL
Qty: 90 TAB | Refills: 1 | Status: SHIPPED | OUTPATIENT
Start: 2020-02-19 | End: 2020-09-02

## 2020-04-06 DIAGNOSIS — N40.0 PROSTATIC ENLARGEMENT: ICD-10-CM

## 2020-04-06 RX ORDER — TAMSULOSIN HYDROCHLORIDE 0.4 MG/1
CAPSULE ORAL
Qty: 90 CAP | Refills: 1 | Status: SHIPPED | OUTPATIENT
Start: 2020-04-06 | End: 2020-10-21 | Stop reason: SDUPTHER

## 2020-04-26 RX ORDER — LEVOTHYROXINE SODIUM 150 UG/1
TABLET ORAL
Qty: 90 TAB | Refills: 1 | Status: SHIPPED | OUTPATIENT
Start: 2020-04-26 | End: 2020-12-07

## 2020-09-02 DIAGNOSIS — N40.0 PROSTATIC ENLARGEMENT: ICD-10-CM

## 2020-09-02 RX ORDER — FINASTERIDE 5 MG/1
TABLET, FILM COATED ORAL
Qty: 90 TAB | Refills: 1 | Status: SHIPPED | OUTPATIENT
Start: 2020-09-02 | End: 2021-03-03

## 2020-09-24 ENCOUNTER — OFFICE VISIT (OUTPATIENT)
Dept: INTERNAL MEDICINE CLINIC | Age: 75
End: 2020-09-24
Attending: INTERNAL MEDICINE
Payer: MEDICARE

## 2020-09-24 ENCOUNTER — HOSPITAL ENCOUNTER (OUTPATIENT)
Dept: GENERAL RADIOLOGY | Age: 75
Discharge: HOME | End: 2020-09-24
Attending: INTERNAL MEDICINE
Payer: MEDICARE

## 2020-09-24 VITALS
BODY MASS INDEX: 28.28 KG/M2 | HEIGHT: 74 IN | RESPIRATION RATE: 18 BRPM | TEMPERATURE: 98.3 F | HEART RATE: 64 BPM | WEIGHT: 220.4 LBS | OXYGEN SATURATION: 97 % | DIASTOLIC BLOOD PRESSURE: 74 MMHG | SYSTOLIC BLOOD PRESSURE: 128 MMHG

## 2020-09-24 DIAGNOSIS — Z00.00 MEDICARE ANNUAL WELLNESS VISIT, SUBSEQUENT: Primary | ICD-10-CM

## 2020-09-24 DIAGNOSIS — M48.061 SPINAL STENOSIS OF LUMBAR REGION AT MULTIPLE LEVELS: ICD-10-CM

## 2020-09-24 DIAGNOSIS — R05.9 COUGH: ICD-10-CM

## 2020-09-24 DIAGNOSIS — N40.0 PROSTATIC ENLARGEMENT: ICD-10-CM

## 2020-09-24 DIAGNOSIS — R73.01 IFG (IMPAIRED FASTING GLUCOSE): ICD-10-CM

## 2020-09-24 DIAGNOSIS — S39.012A BACK STRAIN, INITIAL ENCOUNTER: ICD-10-CM

## 2020-09-24 DIAGNOSIS — I73.9 PERIPHERAL VASCULAR DISEASE (HCC): ICD-10-CM

## 2020-09-24 DIAGNOSIS — D69.6 THROMBOCYTOPENIA (HCC): ICD-10-CM

## 2020-09-24 DIAGNOSIS — F41.9 CHRONIC ANXIETY: ICD-10-CM

## 2020-09-24 DIAGNOSIS — D72.819 LEUKOPENIA, UNSPECIFIED TYPE: ICD-10-CM

## 2020-09-24 DIAGNOSIS — E03.9 HYPOTHYROIDISM, UNSPECIFIED TYPE: ICD-10-CM

## 2020-09-24 LAB
ALBUMIN SERPL-MCNC: 3.1 G/DL (ref 3.5–5)
ALBUMIN/GLOB SERPL: 0.8 {RATIO} (ref 1.1–2.2)
ALP SERPL-CCNC: 114 U/L (ref 45–117)
ALT SERPL-CCNC: 35 U/L (ref 12–78)
ANION GAP SERPL CALC-SCNC: 7 MMOL/L (ref 5–15)
AST SERPL-CCNC: 43 U/L (ref 15–37)
BASOPHILS # BLD: 0 K/UL (ref 0–0.1)
BASOPHILS NFR BLD: 1 % (ref 0–1)
BILIRUB SERPL-MCNC: 0.6 MG/DL (ref 0.2–1)
BUN SERPL-MCNC: 16 MG/DL (ref 6–20)
BUN/CREAT SERPL: 14 (ref 12–20)
CALCIUM SERPL-MCNC: 8.5 MG/DL (ref 8.5–10.1)
CHLORIDE SERPL-SCNC: 111 MMOL/L (ref 97–108)
CHOLEST SERPL-MCNC: 141 MG/DL
CO2 SERPL-SCNC: 24 MMOL/L (ref 21–32)
CREAT SERPL-MCNC: 1.11 MG/DL (ref 0.7–1.3)
DIFFERENTIAL METHOD BLD: ABNORMAL
EOSINOPHIL # BLD: 0.1 K/UL (ref 0–0.4)
EOSINOPHIL NFR BLD: 3 % (ref 0–7)
ERYTHROCYTE [DISTWIDTH] IN BLOOD BY AUTOMATED COUNT: 15.4 % (ref 11.5–14.5)
EST. AVERAGE GLUCOSE BLD GHB EST-MCNC: 123 MG/DL
GLOBULIN SER CALC-MCNC: 3.7 G/DL (ref 2–4)
GLUCOSE SERPL-MCNC: 132 MG/DL (ref 65–100)
HBA1C MFR BLD: 5.9 % (ref 4–5.6)
HCT VFR BLD AUTO: 35.5 % (ref 36.6–50.3)
HDLC SERPL-MCNC: 51 MG/DL
HDLC SERPL: 2.8 {RATIO} (ref 0–5)
HGB BLD-MCNC: 11.5 G/DL (ref 12.1–17)
IMM GRANULOCYTES # BLD AUTO: 0 K/UL (ref 0–0.04)
IMM GRANULOCYTES NFR BLD AUTO: 0 % (ref 0–0.5)
LDLC SERPL CALC-MCNC: 74.8 MG/DL (ref 0–100)
LIPID PROFILE,FLP: NORMAL
LYMPHOCYTES # BLD: 0.8 K/UL (ref 0.8–3.5)
LYMPHOCYTES NFR BLD: 31 % (ref 12–49)
MCH RBC QN AUTO: 30.5 PG (ref 26–34)
MCHC RBC AUTO-ENTMCNC: 32.4 G/DL (ref 30–36.5)
MCV RBC AUTO: 94.2 FL (ref 80–99)
MONOCYTES # BLD: 0.4 K/UL (ref 0–1)
MONOCYTES NFR BLD: 13 % (ref 5–13)
NEUTS SEG # BLD: 1.4 K/UL (ref 1.8–8)
NEUTS SEG NFR BLD: 52 % (ref 32–75)
NRBC # BLD: 0 K/UL (ref 0–0.01)
NRBC BLD-RTO: 0 PER 100 WBC
PLATELET # BLD AUTO: 118 K/UL (ref 150–400)
PMV BLD AUTO: 10.9 FL (ref 8.9–12.9)
POTASSIUM SERPL-SCNC: 4.3 MMOL/L (ref 3.5–5.1)
PROT SERPL-MCNC: 6.8 G/DL (ref 6.4–8.2)
RBC # BLD AUTO: 3.77 M/UL (ref 4.1–5.7)
SODIUM SERPL-SCNC: 142 MMOL/L (ref 136–145)
T4 FREE SERPL-MCNC: 1.1 NG/DL (ref 0.8–1.5)
TRIGL SERPL-MCNC: 76 MG/DL (ref ?–150)
TSH SERPL DL<=0.05 MIU/L-ACNC: 2.96 UIU/ML (ref 0.36–3.74)
VLDLC SERPL CALC-MCNC: 15.2 MG/DL
WBC # BLD AUTO: 2.7 K/UL (ref 4.1–11.1)

## 2020-09-24 PROCEDURE — G8536 NO DOC ELDER MAL SCRN: HCPCS | Performed by: INTERNAL MEDICINE

## 2020-09-24 PROCEDURE — G0439 PPPS, SUBSEQ VISIT: HCPCS | Performed by: INTERNAL MEDICINE

## 2020-09-24 PROCEDURE — G8510 SCR DEP NEG, NO PLAN REQD: HCPCS | Performed by: INTERNAL MEDICINE

## 2020-09-24 PROCEDURE — G0463 HOSPITAL OUTPT CLINIC VISIT: HCPCS | Performed by: INTERNAL MEDICINE

## 2020-09-24 PROCEDURE — 99214 OFFICE O/P EST MOD 30 MIN: CPT | Performed by: INTERNAL MEDICINE

## 2020-09-24 PROCEDURE — G8419 CALC BMI OUT NRM PARAM NOF/U: HCPCS | Performed by: INTERNAL MEDICINE

## 2020-09-24 PROCEDURE — 1101F PT FALLS ASSESS-DOCD LE1/YR: CPT | Performed by: INTERNAL MEDICINE

## 2020-09-24 PROCEDURE — 71046 X-RAY EXAM CHEST 2 VIEWS: CPT

## 2020-09-24 PROCEDURE — G8427 DOCREV CUR MEDS BY ELIG CLIN: HCPCS | Performed by: INTERNAL MEDICINE

## 2020-09-24 RX ORDER — BISACODYL 5 MG/1
TABLET, SUGAR COATED ORAL
COMMUNITY
End: 2021-03-29

## 2020-09-24 RX ORDER — METHYLPREDNISOLONE 4 MG/1
TABLET ORAL
Qty: 1 DOSE PACK | Refills: 0 | Status: SHIPPED | OUTPATIENT
Start: 2020-09-24 | End: 2020-10-04

## 2020-09-24 NOTE — PROGRESS NOTES
This is a Subsequent Medicare Annual Wellness Visit providing Personalized Prevention Plan Services (PPPS) (Performed 12 months after initial AWV and PPPS )    I have reviewed the patient's medical history in detail and updated the computerized patient record. Back strain and pain for 1 week. Began after lifting umbrella stand. Reports cough for about 1 month. Clear sputum. Feels tickle in throat. Denies wheezing, SOB. Taking singulair. Reports post-nasal drip. Taking finasteride, flomax for BPH w some improvement. Hypothyroidism follow-up  Reports no fatigue  denies heat/cold intolerance, bowel/skin changes or CVS symptoms, losing hair, feeling excessive energy, tremulousness, palpitations. Thyroid medication has been unchanged since last medication check and labs. Lab Results   Component Value Date/Time    TSH 3.02 12/16/2019 02:10 PM    Triiodothyronine (T3), free 2.8 09/26/2019 02:14 PM    T4, Free 0.9 12/16/2019 02:10 PM     Wt Readings from Last 3 Encounters:   09/24/20 220 lb 6.4 oz (100 kg)   12/16/19 217 lb (98.4 kg)   11/29/19 212 lb (96.2 kg)         History     Past Medical History:   Diagnosis Date    Abdominal lymphadenopathy 2002    Dr. Daniel.  lymph node enlargement near pancreas. EUS biopsy 1/22/02 benign, but \"possible pneumonia\"    B12 deficiency     Cataract     Dr. Erna Bhardwaj Chronic anxiety     Coronary artery calcification seen on CT scan 04/11/2016    patient with LOW cholesterol. A.  EBT (10/4/10):  LM 8, , LCx 0, . Total = 331.  Degenerative joint disease (DJD) of lumbar spine     MRI 3/2017 shows moderate disease and mild stenosis    Environmental allergies     Dr. Odette Chapin.  also med allergies ibuprofen    Essential tremor     FH: colon cancer     mother    Hepatitis A     as child    Hypospadias     Hypothyroidism 04/11/2016    saw Dr. Lyle Bermudez int he past    IFG (impaired fasting glucose) 04/06/2016    a1c 6.7%    Lymphadenopathy 2002 left axillary biopsy 2/14/02 - benign. Dr. Daniel, Dr. Donita Valle Normal cardiac stress test 04/2016    Dr. Maria R Nielson Obstructive sleep apnea     on CPAP. Dr. Lucilla Mcardle Plantar fasciitis     Dr. Be Sanford.  hx R cortisone injection    Prostatic enlargement 12/6/2017    PVC (premature ventricular contraction)     REM sleep behavior disorder     Screening for skin cancer     sees derm Dr. Saige Miranda Septic shock ([address] 04/04/2002    JW    Sleep apnea     Spinal stenosis of lumbar region at multiple levels 12/6/2017    Thrombocytopenia (HCC)     Trigger finger     Dr. Ira Wilkins. s/p R surgery       Past Surgical History:   Procedure Laterality Date    HX CATARACT REMOVAL Bilateral 11/2019    Dr. eCci Schaeffer HX COLONOSCOPY  08/13/2013    4/15/2002, 11/11/2009, 8/13/2013- Dr Rebecca Mariano    HX COLONOSCOPY  11/27/2018    tubular adenoma    HX ENDOSCOPY  01/22/2002    Dr Rebecca Mariano    HX KNEE ARTHROSCOPY Left     HX LYMPHADENECTOMY  02/14/2002    mediastinoscopy, under left arm for biopsy    HX ORTHOPAEDIC Right     trigger finger    HX TONSILLECTOMY      HX VASECTOMY         Current Outpatient Medications   Medication Sig    aspirin-caffeine (Back and Body Pain Reliever) 500-32.5 mg tab Take  by mouth.  OTHER [employer]    methylPREDNISolone (MEDROL DOSEPACK) 4 mg tablet USE AS DIRECTED ON PACKAGE    finasteride (PROSCAR) 5 mg tablet TAKE 1 TABLET BY MOUTH DAILY    montelukast (SINGULAIR) 10 mg tablet TAKE 1 TABLET BY MOUTH EVERY DAY    levothyroxine (SYNTHROID) 150 mcg tablet TAKE 1 TABLET BY MOUTH DAILY BEFORE BREAKFAST    tamsulosin (FLOMAX) 0.4 mg capsule TAKE ONE CAPSULE BY MOUTH DAILY    cyanocobalamin (VITAMIN B-12) 1,000 mcg tablet Take 1,000 mcg by mouth daily.  clonazePAM (KLONOPIN) 0.5 mg tablet Take  by mouth nightly.  aspirin delayed-release 81 mg tablet Take 1 Tab by mouth daily.  multivitamin (ONE A DAY) tablet Take 1 Tab by mouth daily.      No current facility-administered medications for this visit. Allergies   Allergen Reactions    Advil [Ibuprofen] Hives    Amitex Pse [Pseudoephedrine-Guaifenesin] Other (comments)     Dry mouth    Hydrocodone-Acetaminophen Rash, Itching and Swelling    Levaquin [Levofloxacin] Rash     Body aches       Family History   Problem Relation Age of Onset    Cancer Mother         colon    Cancer Father         lung        reports that he has never smoked. He has never used smokeless tobacco.   reports no history of alcohol use. Depression Risk Factor Screening:       Alcohol Risk Factor Screening: On any occasion during the past 3 months, have you had more than 3 drinks containing alcohol? No    Do you average more than 14 drinks per week? No      Functional Ability and Level of Safety:     Hearing Loss   mild    Activities of Daily Living   Self-care. Requires assistance with: no ADLs    Fall Risk     Fall Risk Assessment, last 12 mths 12/16/2019   Able to walk? Yes   Fall in past 12 months? No         Abuse Screen   Patient is not abused    Review of Systems   A comprehensive review of systems was negative except for that written in the HPI. Physical Examination     Evaluation of Cognitive Function:  Mood/affect:  neutral, happy  Appearance: age appropriate  Family member/caregiver input: none    Blood pressure 128/74, pulse 64, temperature 98.3 °F (36.8 °C), temperature source Oral, resp. rate 18, height 6' 2\" (1.88 m), weight 220 lb 6.4 oz (100 kg), SpO2 97 %.   General appearance: alert, cooperative, no distress, appears stated age  Neck: supple, symmetrical, trachea midline, no adenopathy, thyroid: not enlarged, symmetric, no tenderness/mass/nodules, no carotid bruit and no JVD  Lungs: clear to auscultation bilaterally  Heart: regular rate and rhythm, S1, S2 normal, no murmur, click, rub or gallop  Extremities: extremities normal, atraumatic, no cyanosis or edema    Patient Care Team:  Derek Osuna MD as PCP - General (Internal Medicine)  Sarath Lackey MD as PCP - [facility] Empaneled Provider  Leda Valladares MD (Pulmonary Disease)      Advice/Referrals/Counseling   Education and counseling provided. See below for specific orders    Assessment/Plan   Diagnoses and all orders for this visit:    1. Medicare annual wellness visit, subsequent  -     LIPID PANEL; Future    2. Back strain, initial encounter  Stretching exercises demonstrated for for this condition  Consider PT  -     methylPREDNISolone (MEDROL DOSEPACK) 4 mg tablet; USE AS DIRECTED ON PACKAGE    3. Spinal stenosis of lumbar region at multiple levels  -     methylPREDNISolone (MEDROL DOSEPACK) 4 mg tablet; USE AS DIRECTED ON PACKAGE    4. Thrombocytopenia (HCC)  -     CBC WITH AUTOMATED DIFF; Future    5. IFG (impaired fasting glucose)  The patient is asked to make an attempt to improve diet and exercise patterns to aid in medical management of this problem\  -     HEMOGLOBIN A1C WITH EAG; Future  -     METABOLIC PANEL, COMPREHENSIVE; Future    6. Leukopenia, unspecified type- consider referral  -     CBC WITH AUTOMATED DIFF; Future    7. Hypothyroidism, unspecified type  Currently asymptomatic  -     TSH 3RD GENERATION; Future  -     T4, FREE; Future    8. Prostatic enlargement  -     PSA W/ REFLX FREE PSA; Future    9. Cough- likely benign, allergic  -     XR CHEST PA LAT; Future    10. Chronic anxiety  Controlled on current regimen. Continue current medications as written in chart.  profile was accessed online for Remigio Clifton and reviewed by me during this encounter. I did not see evidence of inappropriate or suspicious controlled substance prescription activity. 11. Peripheral vascular disease ([facility] [address]  Currently asymptomatic  Questionable dx    . Potential medication side effects were discussed with the patient; let me know if any occur.   Return for yearly Annual Wellness Visits

## 2020-09-24 NOTE — PROGRESS NOTES
Chief Complaint   Patient presents with   [address]     3 most recent [facility] Screens 9/24/2020   Little interest or pleasure in doing things Not at all   Feeling down, depressed, irritable, or hopeless Not at all   Total Score PHQ 2 0     Abuse Screening Questionnaire 9/24/2020   Do you ever feel afraid of your partner? N   Are you in a relationship with someone who physically or mentally threatens you? N   Is it safe for you to go home? Y     Visit Vitals  /74 (BP 1 Location: Left arm, BP Patient Position: Sitting)   Pulse 64   Temp 98.3 °F (36.8 °C) (Oral)   Resp 18   Ht 6' 2\" (1.88 m)   Wt 220 lb 6.4 oz (100 kg)   SpO2 97%   BMI 28.30 kg/m²     1. Have you been to the ER, urgent care clinic since your last visit? Hospitalized since your last visit?no    2. Have you seen or consulted any other health care providers outside of the [address] [place] [address] since your last visit? Include any pap smears or colon screening.  Eye doctor,Dermatology

## 2020-09-25 LAB
PSA SERPL-MCNC: 0.4 NG/ML (ref 0–4)
REFLEX CRITERIA: NORMAL

## 2020-09-29 DIAGNOSIS — D61.818 PANCYTOPENIA (HCC): Primary | ICD-10-CM

## 2020-09-29 DIAGNOSIS — R09.89 HYPERINFLATION OF LUNGS: ICD-10-CM

## 2020-09-29 DIAGNOSIS — R05.9 COUGH: Primary | ICD-10-CM

## 2020-09-29 RX ORDER — EZETIMIBE 10 MG/1
10 TABLET ORAL DAILY
Qty: 90 TAB | Refills: 1 | Status: SHIPPED | OUTPATIENT
Start: 2020-09-29 | End: 2020-10-14 | Stop reason: ALTCHOICE

## 2020-09-30 ENCOUNTER — TELEPHONE (OUTPATIENT)
Dept: INTERNAL MEDICINE CLINIC | Age: 75
End: 2020-09-30

## 2020-09-30 NOTE — TELEPHONE ENCOUNTER
Spoke to pt. He has been advised of results and recommendations. He voices understanding and thanks. He states he will call Dr Tea Miller office to schedule an appt.

## 2020-09-30 NOTE — TELEPHONE ENCOUNTER
Spoke to pt. He has been advised of results and recommendations. He voices understanding and states he will call [facility] [employer] to schedule. Order faxed.

## 2020-09-30 NOTE — TELEPHONE ENCOUNTER
----- Message from Perry Angeles MD sent at 9/29/2020 10:21 PM EDT -----  [facility], please let him know that I recommend consulting Dr. Daniel again in [facility]. about his low blood cell counts. His white cells and platelets are lower and now he is anemic mildly.   Please refer and send lab results to Dr. Daniel

## 2020-09-30 NOTE — TELEPHONE ENCOUNTER
----- Message from Abhijit Willson MD sent at 9/29/2020 10:18 PM EDT -----  Zaynab Whitley, please let him know that I ordered a PFT lung function test to evaluate his cough.   Xray shows possible emphysema (hyperinflation)

## 2020-09-30 NOTE — TELEPHONE ENCOUNTER
Patient called following up on orders/medical records/labs to be faxed to 2 doctors PCP has referred him to see. Patient request nurse please return his call to advise she has faxed information needed. [phone]    Patient advises DR. Daniel is no longer practicing, and he will be a new patient due to not being seen by DR. Daniel's practice for some time. Patient request information be faxed attn. Dean at [facility] advised patient nurse faxed order to   [facility] angel Martinez at [facility] for him to schedule. Nurse will fax medical records needed if has not faxed already.

## 2020-10-02 NOTE — TELEPHONE ENCOUNTER
----- Message from [facility] sent at 10/1/2020  4:42 PM EDT -----  Regarding: Dr. Abdon Munson  To: [facility]   Subject:   [facility] / Telephone   Patient's first and last name: Low Quezada   : 1945  MRN number: [id number]    Caller's first and last name: Low Quezada   Reason for call: Requested a call back   Callback required yes/no and why: Yes   Best contact number(s): [phone]  Details to clarify the request: Pt stated he received a call from Garett Lopez on yesterday morning and he would like to speak with her regarding a pulmonary function test and his low blood count ASAP.

## 2020-10-05 ENCOUNTER — TELEPHONE (OUTPATIENT)
Dept: INTERNAL MEDICINE CLINIC | Age: 75
End: 2020-10-05

## 2020-10-05 NOTE — TELEPHONE ENCOUNTER
[address] called in advised they did receive referral but did not receive last 3-6 mnth office notes, labs, demographics or insurance information.   Please fax ASAP to [phone]

## 2020-10-21 DIAGNOSIS — N40.0 PROSTATIC ENLARGEMENT: ICD-10-CM

## 2020-10-21 RX ORDER — TAMSULOSIN HYDROCHLORIDE 0.4 MG/1
CAPSULE ORAL
Qty: 90 CAP | Refills: 1 | Status: SHIPPED | OUTPATIENT
Start: 2020-10-21 | End: 2021-04-29

## 2020-10-21 NOTE — TELEPHONE ENCOUNTER
Patient is now out of meds, \[facility]\"" pharmacy sent a request on Monday and they have not heard back , please send in today

## 2020-11-09 ENCOUNTER — TRANSCRIBE ORDER (OUTPATIENT)
Dept: SCHEDULING | Age: 75
End: 2020-11-09

## 2020-11-09 DIAGNOSIS — D61.818 PANCYTOPENIA (HCC): Primary | ICD-10-CM

## 2020-11-11 ENCOUNTER — HOSPITAL ENCOUNTER (OUTPATIENT)
Dept: CT IMAGING | Age: 75
Discharge: HOME | End: 2020-11-11
Attending: INTERNAL MEDICINE
Payer: MEDICARE

## 2020-11-11 VITALS
OXYGEN SATURATION: 94 % | RESPIRATION RATE: 14 BRPM | DIASTOLIC BLOOD PRESSURE: 60 MMHG | HEART RATE: 58 BPM | SYSTOLIC BLOOD PRESSURE: 105 MMHG

## 2020-11-11 DIAGNOSIS — D61.818 PANCYTOPENIA (HCC): ICD-10-CM

## 2020-11-11 LAB
BASOPHILS # BLD: 0 K/UL (ref 0–0.1)
BASOPHILS NFR BLD: 1 % (ref 0–1)
DIFFERENTIAL METHOD BLD: ABNORMAL
EOSINOPHIL # BLD: 0.1 K/UL (ref 0–0.4)
EOSINOPHIL NFR BLD: 3 % (ref 0–7)
ERYTHROCYTE [DISTWIDTH] IN BLOOD BY AUTOMATED COUNT: 15.6 % (ref 11.5–14.5)
HCT VFR BLD AUTO: 37.7 % (ref 36.6–50.3)
HGB BLD-MCNC: 12.5 G/DL (ref 12.1–17)
IMM GRANULOCYTES # BLD AUTO: 0 K/UL (ref 0–0.04)
IMM GRANULOCYTES NFR BLD AUTO: 0 % (ref 0–0.5)
LYMPHOCYTES # BLD: 0.9 K/UL (ref 0.8–3.5)
LYMPHOCYTES NFR BLD: 29 % (ref 12–49)
MCH RBC QN AUTO: 30.8 PG (ref 26–34)
MCHC RBC AUTO-ENTMCNC: 33.2 G/DL (ref 30–36.5)
MCV RBC AUTO: 92.9 FL (ref 80–99)
MONOCYTES # BLD: 0.4 K/UL (ref 0–1)
MONOCYTES NFR BLD: 14 % (ref 5–13)
NEUTS SEG # BLD: 1.6 K/UL (ref 1.8–8)
NEUTS SEG NFR BLD: 53 % (ref 32–75)
NRBC # BLD: 0 K/UL (ref 0–0.01)
NRBC BLD-RTO: 0 PER 100 WBC
PLATELET # BLD AUTO: 124 K/UL (ref 150–400)
PMV BLD AUTO: 9.7 FL (ref 8.9–12.9)
RBC # BLD AUTO: 4.06 M/UL (ref 4.1–5.7)
WBC # BLD AUTO: 3 K/UL (ref 4.1–11.1)

## 2020-11-11 PROCEDURE — 88305 TISSUE EXAM BY PATHOLOGIST: CPT

## 2020-11-11 PROCEDURE — 88313 SPECIAL STAINS GROUP 2: CPT

## 2020-11-11 PROCEDURE — 99152 MOD SED SAME PHYS/QHP 5/>YRS: CPT

## 2020-11-11 PROCEDURE — 88311 DECALCIFY TISSUE: CPT

## 2020-11-11 PROCEDURE — 85025 COMPLETE CBC W/AUTO DIFF WBC: CPT

## 2020-11-11 PROCEDURE — 77030014115

## 2020-11-11 PROCEDURE — 88342 IMHCHEM/IMCYTCHM 1ST ANTB: CPT

## 2020-11-11 PROCEDURE — 88341 IMHCHEM/IMCYTCHM EA ADD ANTB: CPT

## 2020-11-11 PROCEDURE — 74011250636 HC RX REV CODE- 250/636: Performed by: RADIOLOGY

## 2020-11-11 PROCEDURE — 36415 COLL VENOUS BLD VENIPUNCTURE: CPT

## 2020-11-11 PROCEDURE — 77030028872 HC BN BIOP NDL ON CNTRL TY TELE -C

## 2020-11-11 PROCEDURE — 38221 DX BONE MARROW BIOPSIES: CPT

## 2020-11-11 RX ORDER — MIDAZOLAM HYDROCHLORIDE 1 MG/ML
5 INJECTION, SOLUTION INTRAMUSCULAR; INTRAVENOUS
Status: DISCONTINUED | OUTPATIENT
Start: 2020-11-11 | End: 2020-11-11

## 2020-11-11 RX ORDER — FENTANYL CITRATE 50 UG/ML
100 INJECTION, SOLUTION INTRAMUSCULAR; INTRAVENOUS
Status: DISCONTINUED | OUTPATIENT
Start: 2020-11-11 | End: 2020-11-11

## 2020-11-11 RX ADMIN — FENTANYL CITRATE 25 MCG: 50 INJECTION, SOLUTION INTRAMUSCULAR; INTRAVENOUS at 10:09

## 2020-11-11 RX ADMIN — MIDAZOLAM HYDROCHLORIDE 1 MG: 1 INJECTION, SOLUTION INTRAMUSCULAR; INTRAVENOUS at 10:09

## 2020-11-11 RX ADMIN — FENTANYL CITRATE 25 MCG: 50 INJECTION, SOLUTION INTRAMUSCULAR; INTRAVENOUS at 10:06

## 2020-11-11 RX ADMIN — MIDAZOLAM HYDROCHLORIDE 1 MG: 1 INJECTION, SOLUTION INTRAMUSCULAR; INTRAVENOUS at 10:12

## 2020-11-11 RX ADMIN — MIDAZOLAM HYDROCHLORIDE 1 MG: 1 INJECTION, SOLUTION INTRAMUSCULAR; INTRAVENOUS at 10:06

## 2020-11-11 RX ADMIN — MIDAZOLAM HYDROCHLORIDE 1 MG: 1 INJECTION, SOLUTION INTRAMUSCULAR; INTRAVENOUS at 10:15

## 2020-11-11 RX ADMIN — FENTANYL CITRATE 25 MCG: 50 INJECTION, SOLUTION INTRAMUSCULAR; INTRAVENOUS at 10:15

## 2020-11-11 RX ADMIN — FENTANYL CITRATE 25 MCG: 50 INJECTION, SOLUTION INTRAMUSCULAR; INTRAVENOUS at 10:12

## 2020-11-11 NOTE — DISCHARGE INSTRUCTIONS
Bone Marrow Aspiration and Biopsy: What to Expect at Home  Your Recovery  The biopsy site may feel sore for several days. It can help to walk, take pain medicine, and put ice packs on the site. You will probably be able to return to work and your usual activities the day after the procedure. Your doctor or nurse will call you with the results of your test.  This care sheet gives you a general idea about how long it will take for you to recover. But each person recovers at a different pace. Follow the steps below to get better as quickly as possible. How can you care for yourself at home? Activity    · Rest when you feel tired. Getting enough sleep will help you recover. · You may drive when you are no longer taking pain pills and can quickly move your foot from the gas pedal to the brake. You must also be able to sit comfortably for a long period of time, even if you do not plan to go far. You might get caught in traffic. · Most people are able to return to work the day after the procedure. Medicines    · Your doctor will tell you if and when you can restart your medicines. He or she will also give you instructions about taking any new medicines. · If you take blood thinners, such as warfarin (Coumadin), clopidogrel (Plavix), or aspirin, be sure to talk to your doctor. He or she will tell you if and when to start taking those medicines again. Make sure that you understand exactly what your doctor wants you to do. · Be safe with medicines. Take pain medicines exactly as directed. ? If the doctor gave you a prescription medicine for pain, take it as prescribed. ? If you are not taking a prescription pain medicine, take an over-the-counter medicine such as acetaminophen (Tylenol), ibuprofen (Advil, Motrin), or naproxen (Aleve). Read and follow all instructions on the label. ? Do not take two or more pain medicines at the same time unless the doctor told you to.  Many pain medicines have acetaminophen, which is Tylenol. Too much acetaminophen (Tylenol) can be harmful. · If you think your pain medicine is making you sick to your stomach:  ? Take your medicine after meals (unless your doctor has told you not to). ? Ask your doctor for a different pain medicine. · If your doctor prescribed antibiotics, take them as directed. Do not stop taking them just because you feel better. Ice    · Put ice or a cold pack on the biopsy site for 10 to 20 minutes at a time. Put a thin cloth between the ice and your skin. Follow-up care is a key part of your treatment and safety. Be sure to make and go to all appointments, and call your doctor if you are having problems. It's also a good idea to know your test results and keep a list of the medicines you take. When should you call for help? Call 911 anytime you think you may need emergency care. For example, call if:    · You passed out (lost consciousness). Call your doctor now or seek immediate medical care if:    · You have signs of infection, such as:  ? Increased pain, swelling, warmth, or redness. ? Red streaks leading from the biopsy site. ? Pus draining from the biopsy site. ? Swollen lymph nodes in your neck, armpits, or groin. ? A fever. Watch closely for any changes in your health, and be sure to contact your doctor if:    · You are not getting better as expected. Where can you learn more? Go to http://www.gray.com/. Enter E148 in the search box to learn more about \"Bone Marrow Aspiration and Biopsy: What to Expect at Home. \"  Current as of: September 10, 2017  Content Version: 11.8  © 5749-4327 Healthwise, Incorporated. Care instructions adapted under license by [employer] (which disclaims liability or warranty for this information).  If you have questions about a medical condition or this instruction, always ask your healthcare professional. Aki Hussein disclaims any warranty or liability for your use of this information. If you have any questions or concerns please call [facility] at [phone] between the hours of 7:00 am and 3:30 pm or after hours call [phone]. Dwaine Colunga RN, [facility]          Sedation for a Medical Procedure: After Your Visit  Instructions. Sedatives are used to relax you for a procedure. You may or may not be awake during the procedure. Common side effects of sedation medications include:                   Drowsiness, dizziness, euphoria, sleepiness or confusion. I              Unsteady gait. Loss of fine muscle control and delayed reaction time. Visual disturbances, difficulty focusing and blurred vision. Impaired memory recall. Follow-up care is a key component for your health and safety. Be sure to make and go to all medical appointments. Call your doctor if you are having problems. It's also a good idea to keep a list of your allergies, medicines with doses and test results on hand    Home care following your sedation procedure: You may experience some of these side effects or you may not be aware of subtle changes in your behavior or reaction time. Because you received these medications, we are giving you the following instructions: Activity    For your safety, you should not drive until the medicine wears off and you can think clearly and react easily. Do not drive for 24 hours.  Rest when you feel tired. Getting enough sleep will help you recover. Diet     You can eat your normal diet. If your stomach is upset, try bland, low-fat foods like plain rice, broiled chicken, toast, and yogurt.  Drink plenty of fluids unless your doctor advised you to limit your fluids.  Do not consume alcoholic beverages for 24 hours. Instructions   Do not make important personal, business, or legal decisions for 24 hours.    Move slowly and carefully, do not make sudden position changes.  Be alert for dizziness or lightheadedness and move accordingly.  Have a responsible person assist you.  Do not drive for 24 hours.  Do not operate equipment for 24 hours. [employer] mowers, power tools, kitchen appliances, etc.)    Discharge medications:   Resume prior to test medications as prescribed by your personal physician. If you have any questions or concerns call Radiology RN at [phone]  After hours call Radiology on-call at [phone], [facility]      Call [phone] any time you think you may need emergency care. For example:                Call if you passed out (lost consciousness).  The medicine is not wearing off and you cannot think clearly. Watch closely for changes in your health, and be sure to contact your doctor if you have any problems. Where can you learn more? Go to Cub[employer]   Enter G817 in the search box to learn more about \"Sedation for a Medical Procedure: After Your Visit. \"

## 2020-11-11 NOTE — PROGRESS NOTES
5  Pt brought back to [facility] for scheduled CT guided bone marrow biopsy. Confirmed NPO and ride. 0930  IV placed and labs drawn and sent to lab. Mitchell Su in [facility] to discuss procedure and sedation plan with pt and RN. Allergies reviewed. Consent signed. 1002 Pt brought to CT for procedure. Time out performed at 1006. Sedation started at 1006. Procedure started at 1014 and ended at 1020. Pt received a total of  4mg of versed and  100mcg of fentanyl over the course of procedure. Pt tolerated procedure well. Denies pain. Dressing to procedure site CDI. VS monitored throughout procedure. 1027 Pt returned to [facility]. Given cookie and gingerale. [facility] with wife, Loretta  to let her know procedure complete and pt doing well. 1115  Reviewed discharge instructions with patient. Opportunity given for questions. Pt verbalized understanding. Copy provided. IV removed. Pt ambulated to  to get dressed. 1125  Escorted pt out to vehicle in w/c for discharge to home with wife.

## 2020-11-11 NOTE — H&P
Interventional Radiology History and Physical (Outpatient)    11/11/2020    Patient: Reyna Betancourt 66 y.o. male     Referring Physician:  Lay Cardona MD    Chief Complaint: presents for CT guided bone marrow biopsy with conscious sedation    History of Present Illness: pancytopenia    History:  Past Medical History:   Diagnosis Date    Abdominal lymphadenopathy 2002    Dr. Daniel.  lymph node enlargement near pancreas. EUS biopsy 1/22/02 benign, but \"possible pneumonia\"    B12 deficiency     Cataract     Dr. Woody Perez Chronic anxiety     Coronary artery calcification seen on CT scan 04/11/2016    patient with LOW cholesterol. A.  EBT (10/4/10):  LM 8, , LCx 0, . Total = 331.  Degenerative joint disease (DJD) of lumbar spine     MRI 3/2017 shows moderate disease and mild stenosis    Environmental allergies     Dr. Gisel Yusuf. also med allergies ibuprofen    Essential tremor     FH: colon cancer     mother    Hepatitis A     as child    Hypospadias     Hypothyroidism 04/11/2016    saw Dr. Katelynn Huizar int he past    IFG (impaired fasting glucose) 04/06/2016    a1c 6.7%    Lymphadenopathy 2002    left axillary biopsy 2/14/02 - benign. Dr. Daniel, Dr. Lupe Smith Normal cardiac stress test 04/2016    Dr. Daniel Dawn Obstructive sleep apnea     on CPAP. Dr. Blair Kussmaul Plantar fasciitis     Dr. Martínez Desai.  hx R cortisone injection    Prostatic enlargement 12/6/2017    PVC (premature ventricular contraction)     REM sleep behavior disorder     Screening for skin cancer     sees derm Dr. Marie Ortiz Septic shock ([facility] [address] 04/04/2002    JW    Sleep apnea     Spinal stenosis of lumbar region at multiple levels 12/6/2017    Thrombocytopenia (HCC)     Trigger finger     Dr. Rea Kerr.   s/p R surgery     Family History   Problem Relation Age of Onset    Cancer Mother         colon    Cancer Father         lung     Social History     Socioeconomic History    Marital status:      Spouse name: Sulema Shay Number of children: Not on file    Years of education: Not on file    Highest education level: Not on file   Occupational History    Occupation: Retired [employer]   Social Needs    Financial resource strain: Not on file    Food insecurity     Worry: Not on file     Inability: Not on file   [employer] needs     Medical: Not on file     Non-medical: Not on file   Tobacco Use    Smoking status: Never Smoker    Smokeless tobacco: Never Used   Substance and Sexual Activity    Alcohol use: No     Alcohol/week: 0.0 standard drinks    Drug use: No    Sexual activity: Not on file   Lifestyle    Physical activity     Days per week: Not on file     Minutes per session: Not on file    Stress: Not on file   Relationships    Social connections     Talks on phone: Not on file     Gets together: Not on file     Attends [employer] service: Not on file     Active member of club or organization: Not on file     Attends meetings of clubs or organizations: Not on file     Relationship status: Not on file    Intimate partner violence     Fear of current or ex partner: Not on file     Emotionally abused: Not on file     Physically abused: Not on file     Forced sexual activity: Not on file   Other Topics Concern    Not on file   Social History Narrative    Not on file       Allergies: Allergies   Allergen Reactions    Advil [Ibuprofen] Hives    Amitex Pse [Pseudoephedrine-Guaifenesin] Other (comments)     Dry mouth    Hydrocodone-Acetaminophen Rash, Itching and Swelling    Levaquin [Levofloxacin] Rash     Body aches       Prior to Admission Medications:  Prior to Admission medications    Medication Sig Start Date End Date Taking? Authorizing Provider   tamsulosin (FLOMAX) 0.4 mg capsule TAKE ONE CAPSULE BY MOUTH DAILY 10/21/20   Dmitriy Munoz MD   aspirin-caffeine (Back and Body Pain Reliever) 500-32.5 mg tab Take  by mouth.     Provider, Historical   OTHER [facility] Provider, Historical   finasteride (PROSCAR) 5 mg tablet TAKE 1 TABLET BY MOUTH DAILY 9/2/20   Andrea Munoz MD   montelukast (SINGULAIR) 10 mg tablet TAKE 1 TABLET BY MOUTH EVERY DAY 5/11/20   Andrea Munoz MD   levothyroxine (SYNTHROID) 150 mcg tablet TAKE 1 TABLET BY MOUTH DAILY BEFORE BREAKFAST 4/26/20   Andrea Munoz MD   cyanocobalamin (VITAMIN B-12) 1,000 mcg tablet Take 1,000 mcg by mouth daily. Provider, Historical   clonazePAM (KLONOPIN) 0.5 mg tablet Take  by mouth nightly. Provider, Historical   aspirin delayed-release 81 mg tablet Take 1 Tab by mouth daily. 9/7/17   Andrea Munoz MD   multivitamin (ONE A DAY) tablet Take 1 Tab by mouth daily. Provider, Historical       Physical Exam:    There were no vitals taken for this visit. General: alert, cooperative, no distress, appears stated age  Heart: normal S1 and S2  Lungs: clear to auscultation bilaterally    Plan of Care/Planned Procedure:  Risks, benefits, and alternatives reviewed with patient and he agrees to proceed with the procedure.      Marcella Cleveland MD

## 2020-11-30 ENCOUNTER — TELEPHONE (OUTPATIENT)
Dept: INTERNAL MEDICINE CLINIC | Age: 75
End: 2020-11-30

## 2020-11-30 NOTE — TELEPHONE ENCOUNTER
Thank you. I have received the results of his pulmonary function tests done on Nov 13th at pulmonary [facility]. It shows a mild obstructive lung disease like emphysema. This is consistent with his hyperinflation of his lung not seen on chest x-ray. This may contribute to his chronic cough. We can try a daily inhaler such as Spiriva to see if it helps relieve his cough. I am happy to refer him to pulmonary [facility] for consultation if he would prefer.

## 2020-11-30 NOTE — TELEPHONE ENCOUNTER
----- Message from Katarzyna Salmon sent at 11/30/2020 12:01 PM EST -----  Regarding: Dr. Mar Sarkar telephone  General Message/Vendor Calls    Caller's first and last name: pt       Reason for call: pt is requesting to speak with provider or nurse in regards to his pulmonary function results. Pt states he had the test over a month ago.        Callback required yes/no and why: yes       Best contact number(s): [phone]      Details to clarify the request: marry Gaitan

## 2020-12-01 NOTE — TELEPHONE ENCOUNTER
Returned call to pt. He has been advised of results and recommendations. He states he wants to try the Spiriva to see how that helps.

## 2020-12-02 ENCOUNTER — OFFICE VISIT (OUTPATIENT)
Dept: INTERNAL MEDICINE CLINIC | Age: 75
End: 2020-12-02
Payer: MEDICARE

## 2020-12-02 VITALS
RESPIRATION RATE: 13 BRPM | SYSTOLIC BLOOD PRESSURE: 120 MMHG | HEIGHT: 74 IN | WEIGHT: 213.2 LBS | OXYGEN SATURATION: 98 % | DIASTOLIC BLOOD PRESSURE: 63 MMHG | HEART RATE: 70 BPM | TEMPERATURE: 98.5 F | BODY MASS INDEX: 27.36 KG/M2

## 2020-12-02 DIAGNOSIS — R05.3 CHRONIC COUGH: Primary | ICD-10-CM

## 2020-12-02 DIAGNOSIS — J43.9 PULMONARY EMPHYSEMA, UNSPECIFIED EMPHYSEMA TYPE (HCC): ICD-10-CM

## 2020-12-02 DIAGNOSIS — R09.82 POSTNASAL DRIP: ICD-10-CM

## 2020-12-02 PROCEDURE — G8419 CALC BMI OUT NRM PARAM NOF/U: HCPCS | Performed by: INTERNAL MEDICINE

## 2020-12-02 PROCEDURE — G8536 NO DOC ELDER MAL SCRN: HCPCS | Performed by: INTERNAL MEDICINE

## 2020-12-02 PROCEDURE — 1101F PT FALLS ASSESS-DOCD LE1/YR: CPT | Performed by: INTERNAL MEDICINE

## 2020-12-02 PROCEDURE — G0463 HOSPITAL OUTPT CLINIC VISIT: HCPCS | Performed by: INTERNAL MEDICINE

## 2020-12-02 PROCEDURE — 99213 OFFICE O/P EST LOW 20 MIN: CPT | Performed by: INTERNAL MEDICINE

## 2020-12-02 PROCEDURE — G8432 DEP SCR NOT DOC, RNG: HCPCS | Performed by: INTERNAL MEDICINE

## 2020-12-02 PROCEDURE — G8427 DOCREV CUR MEDS BY ELIG CLIN: HCPCS | Performed by: INTERNAL MEDICINE

## 2020-12-02 RX ORDER — LEVOCETIRIZINE DIHYDROCHLORIDE 5 MG/1
5 TABLET ORAL DAILY
Qty: 30 TAB | Refills: 5
Start: 2020-12-02 | End: 2021-10-18

## 2020-12-02 RX ORDER — MELOXICAM 7.5 MG/1
TABLET ORAL
COMMUNITY
Start: 2020-10-07 | End: 2021-03-29

## 2020-12-02 NOTE — PATIENT INSTRUCTIONS
Learning About Emphysema  What is emphysema? Emphysema is damage to the air sacs in your lungs. In a healthy person, the tiny air sacs in the lungs are like balloons. As you breathe in and out, they get bigger and smaller to move air through your lungs. With emphysema, these air sacs lose their stretch. Less oxygen gets into your blood and you feel short of breath. Emphysema is a form of COPD (chronic obstructive pulmonary disease). Emphysema is usually caused by smoking. But chemical fumes, dust, or air pollution also can cause it over time. People who get it in their 35s or 45s may have a disorder that runs in families, called alpha-1 antitrypsin deficiency. But this is rare. What can you expect when you have emphysema? Emphysema gets worse over time. You cannot undo the damage to your lungs. Over time, you may find that:  · You get short of breath even when you do simple things like get dressed or fix a meal.  · It is hard to eat or exercise. · You lose weight and feel weaker. But there are things you can do to prevent more damage and feel better. What are the symptoms? The main symptoms of emphysema are:  · A cough that will not go away. · Mucus that comes up when you cough. · Shortness of breath that gets worse when you exercise. At times, your symptoms may suddenly flare up and get much worse. This is a called an exacerbation (say \"egg-FAWN--BAY-adrianne\"). When this happens, your usual symptoms quickly get worse and stay bad. This can be dangerous. You may have to go to the hospital.  How can you keep emphysema from getting worse? Don't smoke. That is the best way to keep emphysema from getting worse. If you already smoke, it is never too late to stop. If you need help quitting, talk to your doctor about stop-smoking programs and medicines. These can increase your chances of quitting for good. You can do other things to keep emphysema from getting worse:  · Avoid bad air.  Air pollution, chemical fumes, and dust also can make emphysema worse. · Get a flu shot every year. A shot may keep the flu from turning into something more serious, like pneumonia. A flu shot also may lower your chances of having a flare-up. · Get a pneumococcal shot. A shot can prevent some of the serious complications of pneumonia. Ask your doctor how often you should get this shot. How is emphysema treated? Emphysema is treated with medicines and oxygen. You also can take steps at home to stay healthy and keep your condition from getting worse. Medicines and oxygen therapy  · You may be taking medicines such as:  ? Bronchodilators. These help open your airways and make breathing easier. Bronchodilators are either short-acting (work for 6 to 9 hours) or long-acting (work for 24 hours). You inhale most bronchodilators, so they start to act quickly. Always carry your quick-relief inhaler with you in case you need it while you are away from home. ? Corticosteroids. These reduce airway inflammation. They come in pill or inhaled form. You must take these medicines every day for them to work well. ? Antibiotics. These medicines are used when you have a bacterial lung infection. · Take your medicines exactly as prescribed. Call your doctor if you think you are having a problem with your medicine. · Oxygen therapy boosts the amount of oxygen in your blood and helps you breathe easier. Use the flow rate your doctor has recommended, and do not change it without talking to your doctor first.  Other care at home  · If your doctor recommends it, get more exercise. Walking is a good choice. Bit by bit, increase the amount you walk every day. Try for at least 30 minutes on most days of the week. · Learn breathing methodssuch as breathing through pursed lipsto help you become less short of breath. · If your doctor has not set you up with a pulmonary rehabilitation program, talk to him or her about whether rehab is right for you. Rehab includes exercise programs, education about your disease and how to manage it, help with diet and other changes, and emotional support. · Eat regular, healthy meals. Use bronchodilators about 1 hour before you eat to make it easier to eat. Eat several small meals instead of three large ones. Drink beverages at the end of the meal. Avoid foods that are hard to chew. Follow-up care is a key part of your treatment and safety. Be sure to make and go to all appointments, and call your doctor if you are having problems. It's also a good idea to know your test results and keep a list of the medicines you take. Where can you learn more? Go to http://www.gray.com/  Enter L488 in the search box to learn more about \"Learning About Emphysema. \"  Current as of: February 24, 2020               Content Version: 12.6  © 2054-6401 [employer]. Care instructions adapted under license by [employer] (which disclaims liability or warranty for this information). If you have questions about a medical condition or this instruction, always ask your healthcare professional. [address] any warranty or liability for your use of this information.

## 2020-12-03 NOTE — PROGRESS NOTES
HISTORY OF PRESENT ILLNESS    Chief Complaint   Patient presents with    Results     Discuss PFT results. Presents for follow-up  Regarding chronic cough. He has a mucousy feeling in the back of his throat and clears his throat and coughs. He denies any significant wheezing or sensation of expectorating from his chest.  He denies shortness of breath. Chest x-ray revealed hyperinflation. [facility] ago showed mild obstructive disease. No significant change with bronchodilator. He has been taking Xyzal for his cough for the last few months and his wife says it has helped. Is no longer taking Singulair. He was given a prescription for Spiriva yesterday for possible emphysema and he filled it, cost him $400. Has taken his first dose today. Review of Systems   All other systems reviewed and are negative, except as noted in HPI    Past Medical and Surgical History   has a past medical history of Abdominal lymphadenopathy (2002), B12 deficiency, Cataract, Chronic anxiety, Coronary artery calcification seen on CT scan (04/11/2016), Degenerative joint disease (DJD) of lumbar spine, Environmental allergies, Essential tremor, FH: colon cancer, Hepatitis A, Hypospadias, Hypothyroidism (04/11/2016), IFG (impaired fasting glucose) (04/06/2016), Lymphadenopathy (2002), Mononucleosis, Normal cardiac stress test (04/2016), Obstructive sleep apnea, Plantar fasciitis, Prostatic enlargement (12/6/2017), PVC (premature ventricular contraction), REM sleep behavior disorder, Screening for skin cancer, Septic shock ([address] (04/04/2002), Sleep apnea, Spinal stenosis of lumbar region at multiple levels (12/6/2017), Thrombocytopenia ([address] and Trigger finger.    has a past surgical history that includes hx tonsillectomy; hx vasectomy; hx endoscopy (01/22/2002); hx lymphadenectomy (02/14/2002); hx knee arthroscopy (Left); hx colonoscopy (08/13/2013); hx orthopaedic (Right); hx colonoscopy (11/27/2018); and hx cataract removal (Bilateral, 11/2019). reports that he has never smoked. He has never used smokeless tobacco. He reports that he does not drink alcohol or use drugs. family history includes Cancer in his father and mother. Physical Exam   Nursing note and vitals reviewed. Blood pressure 120/63, pulse 70, temperature 98.5 °F (36.9 °C), temperature source Oral, resp. rate 13, height 6' 2\" (1.88 m), weight 213 lb 3.2 oz (96.7 kg), SpO2 98 %. Constitutional:  No distress. Eyes: Conjunctivae are normal.   Ears:  Hearing grossly intact  Cardiovascular: Normal rate. regular rhythm, no murmurs or gallops  No edema  Pulmonary/Chest: Effort normal.   CTAB  Musculoskeletal: moves all 4 extremities   Neurological: Alert and oriented to person, place, and time. Skin: No rash noted. Psychiatric: Normal mood and affect. Behavior is normal.     ASSESSMENT and PLAN  Diagnoses and all orders for this visit:    1. Chronic cough  Nicole Solitarioner of his chronic cough sounds like it is postnasal drip and posterior pharyngeal mucus production. Less likely pulmonary etiology, but it is reasonable to try long-acting bronchodilator like Spiriva to see if it helps. Resume Xyzal.  Can stop sprays after 1 month if no appreciable change. 2. Postnasal drip  -     Xyzal 5 mg tablet; Take 1 Tab by mouth daily. Indications: inflammation of the nose due to an allergy    3. Pulmonary emphysema, unspecified emphysema type ([address]  He has mild obstructive lung disease. Did not think this has any major clinical consequences. Denies any shortness of breath. Will try Spiriva. Consider combination LAMA LABA or ICS/LABA but I do not think they be necessarily helpful. Reassured. Patient Instructions        Learning About Emphysema  What is emphysema? Emphysema is damage to the air sacs in your lungs. In a healthy person, the tiny air sacs in the lungs are like balloons.  As you breathe in and out, they get bigger and smaller to move air through your lungs. With emphysema, these air sacs lose their stretch. Less oxygen gets into your blood and you feel short of breath. Emphysema is a form of COPD (chronic obstructive pulmonary disease). Emphysema is usually caused by smoking. But chemical fumes, dust, or air pollution also can cause it over time. People who get it in their 35s or 45s may have a disorder that runs in families, called alpha-1 antitrypsin deficiency. But this is rare. What can you expect when you have emphysema? Emphysema gets worse over time. You cannot undo the damage to your lungs. Over time, you may find that:  · You get short of breath even when you do simple things like get dressed or fix a meal.  · It is hard to eat or exercise. · You lose weight and feel weaker. But there are things you can do to prevent more damage and feel better. What are the symptoms? The main symptoms of emphysema are:  · A cough that will not go away. · Mucus that comes up when you cough. · Shortness of breath that gets worse when you exercise. At times, your symptoms may suddenly flare up and get much worse. This is a called an exacerbation (say \"egg-ZASS-er-BAY-un\"). When this happens, your usual symptoms quickly get worse and stay bad. This can be dangerous. You may have to go to the hospital.  How can you keep emphysema from getting worse? Don't smoke. That is the best way to keep emphysema from getting worse. If you already smoke, it is never too late to stop. If you need help quitting, talk to your doctor about stop-smoking programs and medicines. These can increase your chances of quitting for good. You can do other things to keep emphysema from getting worse:  · Avoid bad air. Air pollution, chemical fumes, and dust also can make emphysema worse. · Get a flu shot every year. A shot may keep the flu from turning into something more serious, like pneumonia.  A flu shot also may lower your chances of having a flare-up. · Get a pneumococcal shot. A shot can prevent some of the serious complications of pneumonia. Ask your doctor how often you should get this shot. How is emphysema treated? Emphysema is treated with medicines and oxygen. You also can take steps at home to stay healthy and keep your condition from getting worse. Medicines and oxygen therapy  · You may be taking medicines such as:  ? Bronchodilators. These help open your airways and make breathing easier. Bronchodilators are either short-acting (work for 6 to 9 hours) or long-acting (work for 24 hours). You inhale most bronchodilators, so they start to act quickly. Always carry your quick-relief inhaler with you in case you need it while you are away from home. ? Corticosteroids. These reduce airway inflammation. They come in pill or inhaled form. You must take these medicines every day for them to work well. ? Antibiotics. These medicines are used when you have a bacterial lung infection. · Take your medicines exactly as prescribed. Call your doctor if you think you are having a problem with your medicine. · Oxygen therapy boosts the amount of oxygen in your blood and helps you breathe easier. Use the flow rate your doctor has recommended, and do not change it without talking to your doctor first.  Other care at home  · If your doctor recommends it, get more exercise. Walking is a good choice. Bit by bit, increase the amount you walk every day. Try for at least 30 minutes on most days of the week. · Learn breathing methodssuch as breathing through pursed lipsto help you become less short of breath. · If your doctor has not set you up with a pulmonary rehabilitation program, talk to him or her about whether rehab is right for you. Rehab includes exercise programs, education about your disease and how to manage it, help with diet and other changes, and emotional support. · Eat regular, healthy meals.  Use bronchodilators about 1 hour before you eat to make it easier to eat. Eat several small meals instead of three large ones. Drink beverages at the end of the meal. Avoid foods that are hard to chew. Follow-up care is a key part of your treatment and safety. Be sure to make and go to all appointments, and call your doctor if you are having problems. It's also a good idea to know your test results and keep a list of the medicines you take. Where can you learn more? Go to http://www.gray.com/  Enter L488 in the search box to learn more about \"Learning About Emphysema. \"  Current as of: February 24, 2020               Content Version: 12.6  © 3431-6606 [employer]. Care instructions adapted under license by [employer] (which disclaims liability or warranty for this information). If you have questions about a medical condition or this instruction, always ask your healthcare professional. [place] [address] any warranty or liability for your use of this information. lab results and schedule of future lab studies reviewed with patient  reviewed medications and side effects in detail    Return to clinic for further evaluation if new symptoms develop        Current Outpatient Medications   Medication Sig    Xyzal 5 mg tablet Take 1 Tab by mouth daily. Indications: inflammation of the nose due to an allergy    tiotropium (SPIRIVA) 18 mcg inhalation capsule Take 1 Cap by inhalation daily. Indications: bronchospasm prevention with COPD    tamsulosin (FLOMAX) 0.4 mg capsule TAKE ONE CAPSULE BY MOUTH DAILY    aspirin-caffeine (Back and Body Pain Reliever) 500-32.5 mg tab Take  by mouth.  finasteride (PROSCAR) 5 mg tablet TAKE 1 TABLET BY MOUTH DAILY    levothyroxine (SYNTHROID) 150 mcg tablet TAKE 1 TABLET BY MOUTH DAILY BEFORE BREAKFAST    cyanocobalamin (VITAMIN B-12) 1,000 mcg tablet Take 1,000 mcg by mouth daily.     clonazePAM (KLONOPIN) 0.5 mg tablet Take  by mouth nightly.  aspirin delayed-release 81 mg tablet Take 1 Tab by mouth daily.  multivitamin (ONE A DAY) tablet Take 1 Tab by mouth daily.  meloxicam (MOBIC) 7.5 mg tablet Indications: NOT TAKING.    OTHER [employer]   Indications: NOT TAKING.  montelukast (SINGULAIR) 10 mg tablet TAKE 1 TABLET BY MOUTH EVERY DAY (Patient taking differently: Indications: NOT TAKING.)     No current facility-administered medications for this visit.

## 2020-12-07 RX ORDER — LEVOTHYROXINE SODIUM 150 UG/1
TABLET ORAL
Qty: 90 TAB | Refills: 1 | Status: SHIPPED | OUTPATIENT
Start: 2020-12-07 | End: 2021-06-13

## 2021-02-15 DIAGNOSIS — R09.89 HYPERINFLATION OF LUNGS: ICD-10-CM

## 2021-02-15 DIAGNOSIS — R05.9 COUGH: ICD-10-CM

## 2021-03-03 DIAGNOSIS — N40.0 PROSTATIC ENLARGEMENT: ICD-10-CM

## 2021-03-03 RX ORDER — FINASTERIDE 5 MG/1
TABLET, FILM COATED ORAL
Qty: 90 TAB | Refills: 1 | Status: SHIPPED | OUTPATIENT
Start: 2021-03-03 | End: 2021-09-20

## 2021-03-25 DIAGNOSIS — M54.16 LUMBAR RADICULOPATHY: Primary | ICD-10-CM

## 2021-03-25 RX ORDER — TRAMADOL HYDROCHLORIDE 50 MG/1
50-100 TABLET ORAL
Qty: 42 TAB | Refills: 0 | Status: SHIPPED | OUTPATIENT
Start: 2021-03-25 | End: 2021-04-01

## 2021-03-29 ENCOUNTER — TRANSCRIBE ORDER (OUTPATIENT)
Dept: SCHEDULING | Age: 76
End: 2021-03-29

## 2021-03-29 DIAGNOSIS — R29.2 HYPERREFLEXIA: Primary | ICD-10-CM

## 2021-03-29 DIAGNOSIS — M54.16 LUMBAR RADICULOPATHY: ICD-10-CM

## 2021-03-29 RX ORDER — DICLOFENAC POTASSIUM 50 MG/1
50 TABLET, FILM COATED ORAL
Qty: 60 TAB | Refills: 2 | Status: SHIPPED | OUTPATIENT
Start: 2021-03-29 | End: 2021-10-18

## 2021-03-29 NOTE — PROGRESS NOTES
4:17 PM    Spoke w/ pt by phone,  verified. Pt would like the diclofenac sent to pharmacy  He thought that he had an ibuprofen allergy over 20 years ago, has seen an allergist since then- he is not allergic to nsaids/ibuprofen  Diclofenac escribed to pharmacy  Pt notes that he wants to hold off on MRIs at this time. He wants to do more PT at this time (it is helping) and then follow up in the office. He denies any falling/balance issues, bowel/bladder dysfunction, any numbness. He will follow up in our office on 4/15/21 at 2 pm. Will contact our office for sooner follow up if needed for any new/worsening/concerning symptoms.      MARIBELL Parsons  Orthopaedic Spine Surgery  Physician Assistant to Dr. Yancy Moctezuma

## 2021-04-16 ENCOUNTER — TRANSCRIBE ORDER (OUTPATIENT)
Dept: SCHEDULING | Age: 76
End: 2021-04-16

## 2021-04-16 DIAGNOSIS — M47.816 LUMBAR SPONDYLOSIS: Primary | ICD-10-CM

## 2021-04-23 VITALS
HEART RATE: 67 BPM | OXYGEN SATURATION: 98 % | DIASTOLIC BLOOD PRESSURE: 73 MMHG | BODY MASS INDEX: 26.32 KG/M2 | SYSTOLIC BLOOD PRESSURE: 153 MMHG | WEIGHT: 205 LBS | RESPIRATION RATE: 17 BRPM | TEMPERATURE: 97.7 F

## 2021-04-23 PROCEDURE — 96375 TX/PRO/DX INJ NEW DRUG ADDON: CPT

## 2021-04-23 PROCEDURE — 96374 THER/PROPH/DIAG INJ IV PUSH: CPT

## 2021-04-23 PROCEDURE — 99282 EMERGENCY DEPT VISIT SF MDM: CPT

## 2021-04-24 ENCOUNTER — HOSPITAL ENCOUNTER (EMERGENCY)
Age: 76
Discharge: HOME | End: 2021-04-24
Attending: EMERGENCY MEDICINE
Payer: MEDICARE

## 2021-04-24 DIAGNOSIS — L50.9 URTICARIA: Primary | ICD-10-CM

## 2021-04-24 LAB
COMMENT, HOLDF: NORMAL
SAMPLES BEING HELD,HOLD: NORMAL

## 2021-04-24 PROCEDURE — 74011250636 HC RX REV CODE- 250/636: Performed by: EMERGENCY MEDICINE

## 2021-04-24 RX ORDER — METHYLPREDNISOLONE 4 MG/1
TABLET ORAL
Qty: 1 DOSE PACK | Refills: 0 | Status: SHIPPED | OUTPATIENT
Start: 2021-04-24 | End: 2021-10-18

## 2021-04-24 RX ORDER — DIPHENHYDRAMINE HYDROCHLORIDE 50 MG/ML
25 INJECTION, SOLUTION INTRAMUSCULAR; INTRAVENOUS
Status: COMPLETED | OUTPATIENT
Start: 2021-04-24 | End: 2021-04-24

## 2021-04-24 RX ORDER — FAMOTIDINE 10 MG/ML
20 INJECTION INTRAVENOUS
Status: COMPLETED | OUTPATIENT
Start: 2021-04-24 | End: 2021-04-24

## 2021-04-24 RX ORDER — DIPHENHYDRAMINE HCL 25 MG
25 CAPSULE ORAL
Qty: 20 CAP | Refills: 0 | Status: SHIPPED | OUTPATIENT
Start: 2021-04-24 | End: 2021-05-04

## 2021-04-24 RX ORDER — FAMOTIDINE 20 MG/1
20 TABLET, FILM COATED ORAL 2 TIMES DAILY
Qty: 14 TAB | Refills: 0 | Status: SHIPPED | OUTPATIENT
Start: 2021-04-24 | End: 2021-05-01

## 2021-04-24 RX ADMIN — METHYLPREDNISOLONE SODIUM SUCCINATE 125 MG: 125 INJECTION, POWDER, FOR SOLUTION INTRAMUSCULAR; INTRAVENOUS at 00:56

## 2021-04-24 RX ADMIN — DIPHENHYDRAMINE HYDROCHLORIDE 25 MG: 50 INJECTION, SOLUTION INTRAMUSCULAR; INTRAVENOUS at 00:56

## 2021-04-24 RX ADMIN — FAMOTIDINE 20 MG: 10 INJECTION, SOLUTION INTRAVENOUS at 00:56

## 2021-04-24 NOTE — ED TRIAGE NOTES
Pt. States noted hives on back of legs around 1900, then noted it getting worse with itching and on torso, took 1 25 mg benadryl around 2115, states look in the mirror and noted hives on his face around 2300. Pt. Has been allergy tested in the past with no results.

## 2021-04-24 NOTE — ED PROVIDER NOTES
HPI   65 yo M presents with hives. Pt noticed itching tonight and noticed hives. Denies sob, tongue swelling. Took benadryl 1 tablet 9:15pm.  Patient reports history of similar symptoms in the past with unknown etiology. Past Medical History:   Diagnosis Date    Abdominal lymphadenopathy 2002    Dr. Daniel.  lymph node enlargement near pancreas. EUS biopsy 1/22/02 benign, but \"possible pneumonia\"    B12 deficiency     Cataract     Dr. Mervat Abernathy Chronic anxiety     Coronary artery calcification seen on CT scan 04/11/2016    patient with LOW cholesterol. A.  EBT (10/4/10):  LM 8, , LCx 0, . Total = 331.  Degenerative joint disease (DJD) of lumbar spine     MRI 3/2017 shows moderate disease and mild stenosis    Environmental allergies     Dr. Flory Huynh. also med allergies ibuprofen    Essential tremor     FH: colon cancer     mother    Hepatitis A     as child    Hypospadias     Hypothyroidism 04/11/2016    saw Dr. Evelyn Rodríguez int he past    IFG (impaired fasting glucose) 04/06/2016    a1c 6.7%    Lymphadenopathy 2002    left axillary biopsy 2/14/02 - benign. Dr. Daniel, Dr. Luciano Fall Normal cardiac stress test 04/2016    Dr. Laura Goodman Obstructive sleep apnea     on CPAP. Dr. Shikha Gaviria Plantar fasciitis     Dr. Hyun Amaya.  hx R cortisone injection    Prostatic enlargement 12/6/2017    PVC (premature ventricular contraction)     REM sleep behavior disorder     Screening for skin cancer     sees derm Dr. Umana Coad Septic shock ([address] 04/04/2002    JW    Sleep apnea     Spinal stenosis of lumbar region at multiple levels 12/6/2017    Thrombocytopenia (HCC)     Trigger finger     Dr. Montserrat Santiago.   s/p R surgery       Past Surgical History:   Procedure Laterality Date    HX CATARACT REMOVAL Bilateral 11/2019    Dr. Mervat Abernathy HX COLONOSCOPY  08/13/2013    4/15/2002, 11/11/2009, 8/13/2013- Dr Marek Brewster    HX COLONOSCOPY  11/27/2018    tubular adenoma    HX ENDOSCOPY 01/22/2002    Dr Lucero Lara  02/14/2002    mediastinoscopy, under left arm for biopsy    HX ORTHOPAEDIC Right     trigger finger    HX TONSILLECTOMY      HX VASECTOMY           Family History:   Problem Relation Age of Onset    Cancer Mother         colon    Cancer Father         lung       Social History     Socioeconomic History    Marital status:      Spouse name: Vi Green Number of children: Not on file    Years of education: Not on file    Highest education level: Not on file   Occupational History    Occupation: Retired [employer]   Social Needs    Financial resource strain: Not on file    Food insecurity     Worry: Not on file     Inability: Not on file   [employer] needs     Medical: Not on file     Non-medical: Not on file   Tobacco Use    Smoking status: Never Smoker    Smokeless tobacco: Never Used   Substance and Sexual Activity    Alcohol use: No     Alcohol/week: 0.0 standard drinks    Drug use: No    Sexual activity: Not on file   Lifestyle    Physical activity     Days per week: Not on file     Minutes per session: Not on file    Stress: Not on file   Relationships    Social connections     Talks on phone: Not on file     Gets together: Not on file     Attends Church service: Not on file     Active member of club or organization: Not on file     Attends meetings of clubs or organizations: Not on file     Relationship status: Not on file    Intimate partner violence     Fear of current or ex partner: Not on file     Emotionally abused: Not on file     Physically abused: Not on file     Forced sexual activity: Not on file   Other Topics Concern    Not on file   Social History Narrative    Not on file         ALLERGIES: Advil [ibuprofen], Amitex pse [pseudoephedrine-guaifenesin], Hydrocodone-acetaminophen, and Levaquin [levofloxacin]    Review of Systems   Constitutional: Negative for chills and fever.    HENT: Positive for facial swelling. Negative for sore throat and trouble swallowing. Respiratory: Negative for cough and shortness of breath. Cardiovascular: Negative for leg swelling. Gastrointestinal: Negative for abdominal pain, diarrhea, nausea and vomiting. Skin: Positive for rash. All other systems reviewed and are negative. Vitals:    04/23/21 2349   BP: (!) 153/73   Pulse: 67   Resp: 17   Temp: 97.7 °F (36.5 °C)   SpO2: 98%   Weight: 93 kg (205 lb)            Physical Exam   Physical Examination: General appearance - alert, well appearing, and in no distress, oriented to person, place, and time and normal appearing weight  Eyes - pupils equal and reactive, extraocular eye movements intact  Neck - supple, no significant adenopathy  Chest - clear to auscultation, no wheezes, rales or rhonchi, symmetric air entry  Heart - normal rate, regular rhythm, normal S1, S2, no murmurs, rubs, clicks or gallops  Abdomen - soft, nontender, nondistended, no masses or organomegaly  Back exam - full range of motion, no tenderness, palpable spasm or pain on motion  Neurological - alert, oriented, normal speech, no focal findings or movement disorder noted  Musculoskeletal - no joint tenderness, deformity or swelling  Extremities - peripheral pulses normal, no pedal edema, no clubbing or cyanosis  Skin -diffuse urticaria, mild erythema to bilateral cheeks, no tongue or uvula swelling, no stridor or shortness of breath  MDM  Number of Diagnoses or Management Options     Amount and/or Complexity of Data Reviewed  Obtain history from someone other than the patient: yes (Wife)    Patient Progress  Patient progress: improved         Procedures  Resolving after meds given in ED. Vital signs stable. Will discharge with Medrol Dosepak, Benadryl and Pepcid. Patient to follow-up with PCP or return to ED for worsening symptoms.

## 2021-04-24 NOTE — DISCHARGE INSTRUCTIONS
We hope that we have addressed all of your medical concerns. The examination and treatment you received in the Emergency Department were for an emergent problem and were not intended as complete care. It is important that you follow up with your healthcare provider(s) for ongoing care. If your symptoms worsen or do not improve as expected, and you are unable to reach your usual health care provider(s), you should return to the Emergency Department. Today's healthcare is undergoing tremendous change, and patient satisfaction surveys are one of the many tools to assess the quality of medical care. You may receive a survey from the [employer] organization regarding your experience in the Emergency Department. I hope that your experience has been completely positive, particularly the medical care that I provided. As such, please participate in the survey; anything less than excellent does not meet my expectations or intentions. [facility] and [facility] participate in nationally recognized quality of care measures. If your blood pressure is greater than 120/80, as reported below, we urge that you seek medical care to address the potential of high blood pressure, commonly known as hypertension. Hypertension can be hereditary or can be caused by certain medical conditions, pain, stress, or \"white coat syndrome. \"       Please make an appointment with your health care provider(s) for follow up of your Emergency Department visit. VITALS:   Patient Vitals for the past 8 hrs:   Temp Pulse Resp BP SpO2   04/23/21 2349 97.7 °F (36.5 °C) 67 17 (!) 153/73 98 %          Thank you for allowing us to provide you with medical care today. We realize that you have many choices for your emergency care needs. Please choose us in the future for any continued health care needs. Jacqueline Arnold, [address] [facility] [address] Office: [phone]            Recent Results (from the past 24 hour(s))   SAMPLES BEING HELD    Collection Time: 04/24/21  1:02 AM   Result Value Ref Range    SAMPLES BEING HELD 1SST,1RD,1BLU     COMMENT        Add-on orders for these samples will be processed based on acceptable specimen integrity and analyte stability, which may vary by analyte. No results found.

## 2021-04-27 ENCOUNTER — HOSPITAL ENCOUNTER (OUTPATIENT)
Dept: MRI IMAGING | Age: 76
Discharge: HOME | End: 2021-04-27
Attending: ORTHOPAEDIC SURGERY
Payer: MEDICARE

## 2021-04-27 DIAGNOSIS — M47.816 LUMBAR SPONDYLOSIS: ICD-10-CM

## 2021-04-27 PROCEDURE — 72148 MRI LUMBAR SPINE W/O DYE: CPT

## 2021-04-29 DIAGNOSIS — N40.0 PROSTATIC ENLARGEMENT: ICD-10-CM

## 2021-04-29 RX ORDER — TAMSULOSIN HYDROCHLORIDE 0.4 MG/1
CAPSULE ORAL
Qty: 90 CAP | Refills: 1 | Status: SHIPPED | OUTPATIENT
Start: 2021-04-29 | End: 2021-10-26

## 2021-06-13 RX ORDER — LEVOTHYROXINE SODIUM 150 UG/1
TABLET ORAL
Qty: 90 TABLET | Refills: 1 | Status: SHIPPED | OUTPATIENT
Start: 2021-06-13 | End: 2022-01-10

## 2021-09-20 DIAGNOSIS — N40.0 PROSTATIC ENLARGEMENT: ICD-10-CM

## 2021-09-20 RX ORDER — FINASTERIDE 5 MG/1
TABLET, FILM COATED ORAL
Qty: 90 TABLET | Refills: 1 | Status: SHIPPED | OUTPATIENT
Start: 2021-09-20 | End: 2022-03-21

## 2021-10-18 ENCOUNTER — NURSE TRIAGE (OUTPATIENT)
Dept: OTHER | Facility: CLINIC | Age: 76
End: 2021-10-18

## 2021-10-18 ENCOUNTER — APPOINTMENT (OUTPATIENT)
Dept: GENERAL RADIOLOGY | Age: 76
DRG: 690 | End: 2021-10-18
Attending: EMERGENCY MEDICINE
Payer: MEDICARE

## 2021-10-18 ENCOUNTER — APPOINTMENT (OUTPATIENT)
Dept: ULTRASOUND IMAGING | Age: 76
DRG: 690 | End: 2021-10-18
Attending: INTERNAL MEDICINE
Payer: MEDICARE

## 2021-10-18 ENCOUNTER — HOSPITAL ENCOUNTER (INPATIENT)
Age: 76
LOS: 2 days | Discharge: HOME | DRG: 690 | End: 2021-10-20
Attending: EMERGENCY MEDICINE | Admitting: INTERNAL MEDICINE
Payer: MEDICARE

## 2021-10-18 DIAGNOSIS — N30.01 ACUTE CYSTITIS WITH HEMATURIA: ICD-10-CM

## 2021-10-18 DIAGNOSIS — R50.9 FEVER, UNSPECIFIED FEVER CAUSE: Primary | ICD-10-CM

## 2021-10-18 DIAGNOSIS — D69.6 THROMBOCYTOPENIA (HCC): ICD-10-CM

## 2021-10-18 PROBLEM — R82.81 PYURIA: Status: ACTIVE | Noted: 2021-10-18

## 2021-10-18 PROBLEM — R31.9 HEMATURIA: Status: ACTIVE | Noted: 2021-10-18

## 2021-10-18 LAB
ALBUMIN SERPL-MCNC: 2.7 G/DL (ref 3.5–5)
ALBUMIN/GLOB SERPL: 0.7 {RATIO} (ref 1.1–2.2)
ALP SERPL-CCNC: 177 U/L (ref 45–117)
ALT SERPL-CCNC: 73 U/L (ref 12–78)
ANION GAP SERPL CALC-SCNC: 9 MMOL/L (ref 5–15)
APPEARANCE UR: ABNORMAL
AST SERPL-CCNC: 86 U/L (ref 15–37)
BACTERIA URNS QL MICRO: ABNORMAL /HPF
BASOPHILS # BLD: 0 K/UL (ref 0–0.1)
BASOPHILS NFR BLD: 0 % (ref 0–1)
BILIRUB SERPL-MCNC: 2.8 MG/DL (ref 0.2–1)
BILIRUB UR QL CFM: NEGATIVE
BUN SERPL-MCNC: 25 MG/DL (ref 6–20)
BUN/CREAT SERPL: 16 (ref 12–20)
CALCIUM SERPL-MCNC: 8.4 MG/DL (ref 8.5–10.1)
CHLORIDE SERPL-SCNC: 107 MMOL/L (ref 97–108)
CO2 SERPL-SCNC: 20 MMOL/L (ref 21–32)
COLOR UR: ABNORMAL
CREAT SERPL-MCNC: 1.53 MG/DL (ref 0.7–1.3)
DIFFERENTIAL METHOD BLD: ABNORMAL
EOSINOPHIL # BLD: 0 K/UL (ref 0–0.4)
EOSINOPHIL NFR BLD: 0 % (ref 0–7)
EPITH CASTS URNS QL MICRO: ABNORMAL /LPF
ERYTHROCYTE [DISTWIDTH] IN BLOOD BY AUTOMATED COUNT: 14.5 % (ref 11.5–14.5)
FLUAV AG NPH QL IA: NEGATIVE
FLUBV AG NOSE QL IA: NEGATIVE
GLOBULIN SER CALC-MCNC: 3.9 G/DL (ref 2–4)
GLUCOSE SERPL-MCNC: 263 MG/DL (ref 65–100)
GLUCOSE UR STRIP.AUTO-MCNC: 100 MG/DL
HCT VFR BLD AUTO: 37.3 % (ref 36.6–50.3)
HGB BLD-MCNC: 12.6 G/DL (ref 12.1–17)
HGB UR QL STRIP: ABNORMAL
IMM GRANULOCYTES # BLD AUTO: 0 K/UL
IMM GRANULOCYTES NFR BLD AUTO: 0 %
KETONES UR QL STRIP.AUTO: ABNORMAL MG/DL
LEUKOCYTE ESTERASE UR QL STRIP.AUTO: ABNORMAL
LYMPHOCYTES # BLD: 0.3 K/UL (ref 0.8–3.5)
LYMPHOCYTES NFR BLD: 3 % (ref 12–49)
MCH RBC QN AUTO: 31.9 PG (ref 26–34)
MCHC RBC AUTO-ENTMCNC: 33.8 G/DL (ref 30–36.5)
MCV RBC AUTO: 94.4 FL (ref 80–99)
MONOCYTES # BLD: 0.2 K/UL (ref 0–1)
MONOCYTES NFR BLD: 2 % (ref 5–13)
NEUTS BAND NFR BLD MANUAL: 2 % (ref 0–6)
NEUTS SEG # BLD: 9.5 K/UL (ref 1.8–8)
NEUTS SEG NFR BLD: 93 % (ref 32–75)
NITRITE UR QL STRIP.AUTO: POSITIVE
NRBC # BLD: 0 K/UL (ref 0–0.01)
NRBC BLD-RTO: 0 PER 100 WBC
PERIPHERAL SMEAR,PSM: NORMAL
PH UR STRIP: 6.5 [PH] (ref 5–8)
PLATELET # BLD AUTO: 111 K/UL (ref 150–400)
PMV BLD AUTO: 10.3 FL (ref 8.9–12.9)
POTASSIUM SERPL-SCNC: 4.1 MMOL/L (ref 3.5–5.1)
PROCALCITONIN SERPL-MCNC: 4.09 NG/ML
PROT SERPL-MCNC: 6.6 G/DL (ref 6.4–8.2)
PROT UR STRIP-MCNC: >300 MG/DL
RBC # BLD AUTO: 3.95 M/UL (ref 4.1–5.7)
RBC #/AREA URNS HPF: ABNORMAL /HPF (ref 0–5)
RBC MORPH BLD: ABNORMAL
SODIUM SERPL-SCNC: 136 MMOL/L (ref 136–145)
SP GR UR REFRACTOMETRY: 1.01 (ref 1–1.03)
TSH SERPL DL<=0.05 MIU/L-ACNC: 0.19 UIU/ML (ref 0.36–3.74)
UR CULT HOLD, URHOLD: NORMAL
UROBILINOGEN UR QL STRIP.AUTO: 2 EU/DL (ref 0.2–1)
WBC # BLD AUTO: 10 K/UL (ref 4.1–11.1)
WBC URNS QL MICRO: >100 /HPF (ref 0–4)

## 2021-10-18 PROCEDURE — 96374 THER/PROPH/DIAG INJ IV PUSH: CPT

## 2021-10-18 PROCEDURE — 87086 URINE CULTURE/COLONY COUNT: CPT

## 2021-10-18 PROCEDURE — 80053 COMPREHEN METABOLIC PANEL: CPT

## 2021-10-18 PROCEDURE — 36415 COLL VENOUS BLD VENIPUNCTURE: CPT

## 2021-10-18 PROCEDURE — 65660000000 HC RM CCU STEPDOWN

## 2021-10-18 PROCEDURE — 87804 INFLUENZA ASSAY W/OPTIC: CPT

## 2021-10-18 PROCEDURE — 85025 COMPLETE CBC W/AUTO DIFF WBC: CPT

## 2021-10-18 PROCEDURE — 74011000250 HC RX REV CODE- 250: Performed by: EMERGENCY MEDICINE

## 2021-10-18 PROCEDURE — 96361 HYDRATE IV INFUSION ADD-ON: CPT

## 2021-10-18 PROCEDURE — 84443 ASSAY THYROID STIM HORMONE: CPT

## 2021-10-18 PROCEDURE — 87186 SC STD MICRODIL/AGAR DIL: CPT

## 2021-10-18 PROCEDURE — 71046 X-RAY EXAM CHEST 2 VIEWS: CPT

## 2021-10-18 PROCEDURE — 87077 CULTURE AEROBIC IDENTIFY: CPT

## 2021-10-18 PROCEDURE — 74011250636 HC RX REV CODE- 250/636: Performed by: EMERGENCY MEDICINE

## 2021-10-18 PROCEDURE — 84145 PROCALCITONIN (PCT): CPT

## 2021-10-18 PROCEDURE — 76770 US EXAM ABDO BACK WALL COMP: CPT

## 2021-10-18 PROCEDURE — 99284 EMERGENCY DEPT VISIT MOD MDM: CPT

## 2021-10-18 PROCEDURE — 74011000258 HC RX REV CODE- 258: Performed by: INTERNAL MEDICINE

## 2021-10-18 PROCEDURE — 81001 URINALYSIS AUTO W/SCOPE: CPT

## 2021-10-18 RX ORDER — SODIUM CHLORIDE 0.9 % (FLUSH) 0.9 %
5-40 SYRINGE (ML) INJECTION AS NEEDED
Status: DISCONTINUED | OUTPATIENT
Start: 2021-10-18 | End: 2021-10-20 | Stop reason: HOSPADM

## 2021-10-18 RX ORDER — TAMSULOSIN HYDROCHLORIDE 0.4 MG/1
0.4 CAPSULE ORAL DAILY
Status: DISCONTINUED | OUTPATIENT
Start: 2021-10-19 | End: 2021-10-20 | Stop reason: HOSPADM

## 2021-10-18 RX ORDER — FINASTERIDE 5 MG/1
5 TABLET, FILM COATED ORAL DAILY
Status: DISCONTINUED | OUTPATIENT
Start: 2021-10-19 | End: 2021-10-20 | Stop reason: HOSPADM

## 2021-10-18 RX ORDER — ONDANSETRON 4 MG/1
4 TABLET, ORALLY DISINTEGRATING ORAL
Status: DISCONTINUED | OUTPATIENT
Start: 2021-10-18 | End: 2021-10-20 | Stop reason: HOSPADM

## 2021-10-18 RX ORDER — ONDANSETRON 2 MG/ML
4 INJECTION INTRAMUSCULAR; INTRAVENOUS
Status: DISCONTINUED | OUTPATIENT
Start: 2021-10-18 | End: 2021-10-20 | Stop reason: HOSPADM

## 2021-10-18 RX ORDER — DEXTROSE MONOHYDRATE AND SODIUM CHLORIDE 5; .9 G/100ML; G/100ML
50 INJECTION, SOLUTION INTRAVENOUS CONTINUOUS
Status: DISCONTINUED | OUTPATIENT
Start: 2021-10-18 | End: 2021-10-20 | Stop reason: HOSPADM

## 2021-10-18 RX ORDER — GUAIFENESIN 600 MG/1
600 TABLET, EXTENDED RELEASE ORAL 2 TIMES DAILY
COMMUNITY
End: 2022-10-20 | Stop reason: ALTCHOICE

## 2021-10-18 RX ORDER — POLYETHYLENE GLYCOL 3350 17 G/17G
17 POWDER, FOR SOLUTION ORAL DAILY PRN
Status: DISCONTINUED | OUTPATIENT
Start: 2021-10-18 | End: 2021-10-20 | Stop reason: HOSPADM

## 2021-10-18 RX ORDER — FEXOFENADINE HYDROCHLORIDE AND PSEUDOEPHEDRINE HYDROCHLORIDE 180; 240 MG/1; MG/1
1 TABLET, FILM COATED, EXTENDED RELEASE ORAL DAILY
COMMUNITY
End: 2022-06-02 | Stop reason: ALTCHOICE

## 2021-10-18 RX ORDER — SODIUM CHLORIDE 0.9 % (FLUSH) 0.9 %
5-40 SYRINGE (ML) INJECTION EVERY 8 HOURS
Status: DISCONTINUED | OUTPATIENT
Start: 2021-10-18 | End: 2021-10-20 | Stop reason: HOSPADM

## 2021-10-18 RX ADMIN — CEFTRIAXONE 1 G: 1 INJECTION, POWDER, FOR SOLUTION INTRAMUSCULAR; INTRAVENOUS at 11:53

## 2021-10-18 RX ADMIN — SODIUM CHLORIDE 1000 ML: 9 INJECTION, SOLUTION INTRAVENOUS at 11:52

## 2021-10-18 RX ADMIN — DEXTROSE AND SODIUM CHLORIDE 50 ML/HR: 5; 900 INJECTION, SOLUTION INTRAVENOUS at 14:26

## 2021-10-18 RX ADMIN — Medication 10 ML: at 14:27

## 2021-10-18 NOTE — TELEPHONE ENCOUNTER
Received call from Suman  at [facility] with Red Flag Complaint. Brief description of triage: 65 y/o with chills and burning with urination  Onset x 5 days, frequent urination,  Temp is currently 100.1    Triage indicates for patient to seen by  pcp in 4 hours     Care advice provided, patient verbalizes understanding; denies any other questions or concerns; instructed to call back for any new or worsening symptoms. Writer provided warm transfer to  Narendra at [facility] for appointment scheduling. Attention Provider: Thank you for allowing me to participate in the care of your patient. The patient was connected to triage in response to information provided to the [facility]. Please do not respond through this encounter as the response is not directed to a shared pool. Reason for Disposition   [1] Discomfort (pain, burning or stinging) when passing urine AND [2] male   Fever > 100.4 F (38.0 C)    Answer Assessment - Initial Assessment Questions  1. SYMPTOM: \"What's the main symptom you're concerned about? \" (e.g., frequency, incontinence)      Temperature     2. ONSET: \"When did the  *No Answer*  start? \"      5 days ago     3. PAIN: \"Is there any pain? \" If so, ask: \"How bad is it? \" (Scale: 1-10; mild, moderate, severe)     Mild  Pain     4. CAUSE: \"What do you think is causing the symptoms? \"     Unsure   Enlarged prostate     5. OTHER SYMPTOMS: \"Do you have any other symptoms? \" (e.g., fever, flank pain, blood in urine, pain with urination)     Pain  When urinating, chronic     6. PREGNANCY: \"Is there any chance you are pregnant? \" \"When was your last menstrual period? \"      N/a    Answer Assessment - Initial Assessment Questions  1. SEVERITY: \"How bad is the pain? \"  (e.g., Scale 1-10; mild, moderate, or severe)    - MILD (1-3): complains slightly about urination hurting    - MODERATE (4-7): interferes with normal activities      - SEVERE (8-10): excruciating, unwilling or unable to urinate because of the pain       Mild     2. FREQUENCY: \"How many times have you had painful urination today? \"       Several  Times     3. PATTERN: \"Is pain present every time you urinate or just sometimes? \"       Yes     4. ONSET: \"When did the painful urination start? \"       5 days     5. FEVER: \"Do you have a fever? \" If so, ask: \"What is your temperature, how was it measured, and when did it start? \"     Unable to get a temp     6. PAST UTI: \"Have you had a urine infection before? \" If so, ask: \"When was the last time? \" and \"What happened that time? \"      Denies     7. CAUSE: \"What do you think is causing the painful urination? \"      Unsure     8. OTHER SYMPTOMS: \"Do you have any other symptoms? \" (e.g., flank pain, penile discharge, scrotal pain, blood in urine)     Denies    Protocols used: URINARY SYMPTOMS-ADULT-, URINATION PAIN - MALE-ADULT-AH

## 2021-10-18 NOTE — ED TRIAGE NOTES
Patient repots intermittent fevers since Wednesday and burning with urination-    Patient also reports frequent urination-  Patient reports a fever prior to arrival; did take Tylenol

## 2021-10-18 NOTE — ED PROVIDER NOTES
HPI patient is a 25-year-old male with past medical history significant for anxiety, degenerative joint disease, colon cancer, hepatitis A, hypothyroidism, lymphadenopathy, sleep apnea, thrombocytopenia, BPH and trigger finger who presents to the ED reporting intermittent fever since Wednesday accompanied by urinary frequency and dysuria. He took Tylenol this morning prior to arrival.  Denies any vomiting or diarrhea. Denies headache, neck pain, visual changes, focal weakness or rash. Denies any f difficulty breathing, difficulty swallowing, SOB or chest pain. Pt. Reports ranking Ensure prior to arrival.          Past Medical History:   Diagnosis Date    Abdominal lymphadenopathy 2002    Dr. Daniel.  lymph node enlargement near pancreas. EUS biopsy 1/22/02 benign, but \"possible pneumonia\"    B12 deficiency     Cataract     Dr. Renuka Brunson Chronic anxiety     Coronary artery calcification seen on CT scan 04/11/2016    patient with LOW cholesterol. A.  EBT (10/4/10):  LM 8, , LCx 0, . Total = 331.  Degenerative joint disease (DJD) of lumbar spine     MRI 3/2017 shows moderate disease and mild stenosis    Environmental allergies     Dr. Mumtaz Torres. also med allergies ibuprofen    Essential tremor     FH: colon cancer     mother    Hepatitis A     as child    Hypospadias     Hypothyroidism 04/11/2016    saw Dr. Elisabeth Spencer int he past    IFG (impaired fasting glucose) 04/06/2016    a1c 6.7%    Lymphadenopathy 2002    left axillary biopsy 2/14/02 - benign. Dr. Daniel, Dr. Sahara Merchant Normal cardiac stress test 04/2016    Dr. Mendez Royalty Obstructive sleep apnea     on CPAP.   Dr. Yesy Chatman Plantar fasciitis     Dr. Talon Desir.  hx R cortisone injection    Prostatic enlargement 12/6/2017    PVC (premature ventricular contraction)     REM sleep behavior disorder     Screening for skin cancer     sees derm Dr. Gunnar Torres    Septic shock ([facility] [address] 04/04/2002    JW    Sleep apnea     Spinal stenosis of lumbar region at multiple levels 12/6/2017    Thrombocytopenia (HCC)     Trigger finger     Dr. Webster Snellen. s/p R surgery       Past Surgical History:   Procedure Laterality Date    HX CATARACT REMOVAL Bilateral 11/2019    Dr. Vilma Heart HX COLONOSCOPY  08/13/2013    4/15/2002, 11/11/2009, 8/13/2013- Dr Gabriela Bob    HX COLONOSCOPY  11/27/2018    tubular adenoma    HX ENDOSCOPY  01/22/2002    Dr Gabriela Bob    HX KNEE ARTHROSCOPY Left     HX LYMPHADENECTOMY  02/14/2002    mediastinoscopy, under left arm for biopsy    HX ORTHOPAEDIC Right     trigger finger    HX TONSILLECTOMY      HX VASECTOMY           Family History:   Problem Relation Age of Onset    Cancer Mother         colon    Cancer Father         lung       Social History     Socioeconomic History    Marital status:      Spouse name: Krystina Kauffman Number of children: Not on file    Years of education: Not on file    Highest education level: Not on file   Occupational History    Occupation: Retired [employer]   Tobacco Use    Smoking status: Never Smoker    Smokeless tobacco: Never Used   Substance and Sexual Activity    Alcohol use: No     Alcohol/week: 0.0 standard drinks    Drug use: No    Sexual activity: Not on file   Other Topics Concern    Not on file   Social History Narrative    Not on file     Social Determinants of Health     Financial Resource Strain:     Difficulty of Paying Living Expenses:    Food Insecurity:     Worried About [email or web address] in the Last Year:     [address] in the Last Year:    Transportation Needs:     Lack of Transportation (Medical):      Lack of Transportation (Non-Medical):    Physical Activity:     Days of Exercise per Week:     Minutes of Exercise per Session:    Stress:     Feeling of Stress :    Social Connections:     Frequency of Communication with Friends and Family:     Frequency of Social Gatherings with Friends and Family:     Attends Mu-ism Services:  Active Member of Clubs or Organizations:     Attends Club or Organization Meetings:     Marital Status:    Intimate Partner Violence:     Fear of Current or Ex-Partner:     Emotionally Abused:     Physically Abused:     Sexually Abused: ALLERGIES: Advil [ibuprofen], Amitex pse [pseudoephedrine-guaifenesin], Hydrocodone-acetaminophen, and Levaquin [levofloxacin]    Review of Systems   Constitutional: Positive for activity change, appetite change and fever. Negative for unexpected weight change. HENT: Negative for congestion, sore throat and trouble swallowing. Respiratory: Negative for cough and shortness of breath. Cardiovascular: Negative for chest pain, palpitations and leg swelling. Gastrointestinal: Negative for abdominal pain, diarrhea, nausea and vomiting. Genitourinary: Positive for dysuria, frequency and urgency. Musculoskeletal: Positive for arthralgias and myalgias. Negative for back pain. Skin: Negative for rash. Neurological: Negative for dizziness, light-headedness and headaches. All other systems reviewed and are negative. Vitals:    10/18/21 0928   BP: (!) 111/55   Pulse: 98   Resp: 16   Temp: 98.1 °F (36.7 °C)   SpO2: 94%   Weight: 95.3 kg (210 lb)   Height: 6' 1\" (1.854 m)            Physical Exam  Vitals and nursing note reviewed. Constitutional:       General: He is not in acute distress. Appearance: He is normal weight. He is ill-appearing. He is not toxic-appearing or diaphoretic. Comments: Elderly male, non-smoker,    HENT:      Head: Normocephalic. Cardiovascular:      Rate and Rhythm: Normal rate and regular rhythm. Pulmonary:      Effort: Pulmonary effort is normal.      Breath sounds: Normal breath sounds. Abdominal:      General: Bowel sounds are normal.      Palpations: Abdomen is soft. Tenderness: There is no abdominal tenderness. There is no guarding or rebound. Hernia: No hernia is present. Musculoskeletal:         General: Normal range of motion. Cervical back: Normal range of motion and neck supple. Right lower leg: No edema. Left lower leg: No edema. Skin:     General: Skin is warm and dry. Neurological:      Mental Status: He is alert. MDM       Procedures        XR CHEST PA LAT    Result Date: 10/18/2021  No acute process or change compared to the prior exam.     Labs Reviewed   URINALYSIS W/MICROSCOPIC - Abnormal; Notable for the following components:       Result Value    Appearance CLOUDY (*)     Protein >300 (*)     Glucose 100 (*)     Ketone TRACE (*)     Blood LARGE (*)     Urobilinogen 2.0 (*)     Nitrites Positive (*)     Leukocyte Esterase LARGE (*)     WBC >100 (*)     Bacteria 4+ (*)     All other components within normal limits   CBC WITH AUTOMATED DIFF - Abnormal; Notable for the following components:    RBC 3.95 (*)     PLATELET 502 (*)     NEUTROPHILS 93 (*)     LYMPHOCYTES 3 (*)     MONOCYTES 2 (*)     ABS. NEUTROPHILS 9.5 (*)     ABS. LYMPHOCYTES 0.3 (*)     All other components within normal limits   METABOLIC PANEL, COMPREHENSIVE - Abnormal; Notable for the following components:    CO2 20 (*)     Glucose 263 (*)     BUN 25 (*)     Creatinine 1.53 (*)     GFR est AA 54 (*)     GFR est non-AA 44 (*)     Calcium 8.4 (*)     Bilirubin, total 2.8 (*)     AST (SGOT) 86 (*)     Alk. phosphatase 177 (*)     Albumin 2.7 (*)     A-G Ratio 0.7 (*)     All other components within normal limits   URINE CULTURE HOLD SAMPLE   CULTURE, URINE   INFLUENZA A+B VIRAL AGS   PROCALCITONIN   BILIRUBIN, CONFIRM     Perfect Serve Consult for Admission  12:21 PM    ED Room Number: S63/F63  Patient Name and age:  Melisa Jones 64 y.o.  male  Working Diagnosis:   1. Fever, unspecified fever cause    2. Acute cystitis with hematuria    3.  Thrombocytopenia ([address]        COVID-19 Suspicion:  no  Sepsis present:  no  Reassessment needed: yes  Code Status:  Full Code  Readmission: no  Isolation Requirements:  no  Recommended Level of Care:  med/surg  Department: [facility] ED - [phone]  Other:      Dr. Laura Puri examined the pt and discussed the plan of care. Dylan Pate NP    12:24 PM  Patient's results and plan of care have been reviewed with the patient and his wife. Patient and/or family have verbally conveyed their understanding and agreement of the patient's signs, symptoms, diagnosis, treatment and prognosis and additionally agree to be admitted. Dylan Pate NP

## 2021-10-18 NOTE — H&P
[facility] [place] [facility]  [address] [facility], [address] [place] [address]  [phone]    Admission History and Physical      NAME:  Lou Escobar   :   1945   MRN:  [id number]     PCP:  Tirso Woody MD     Date/Time of service:  10/18/2021  3:55 PM        Subjective:     CHIEF COMPLAINT: Fevers chills    HISTORY OF PRESENT ILLNESS:     Mr. Miki Dobson is a 64 y.o. male with a past medical history of hypothyroidism, BPH, sleep apnea who is admitted with pyuria and hematuria. Mr. Miki Dobson states that a few days ago he began to experience urinary frequency, dysuria and fever chills. His fevers continue to worsen and so he was advised by his primary care doctor to present to the emergency room. In the emergency room he was found to have hematuria and pyuria. He denies any associated abdominal pain, chest pain or  shortness of breath. Patient is accompanied by his wife is at bedside. Allergies   Allergen Reactions    Advil [Ibuprofen] Hives    Amitex Pse [Pseudoephedrine-Guaifenesin] Other (comments)     Dry mouth    Hydrocodone-Acetaminophen Rash, Itching and Swelling    Levaquin [Levofloxacin] Rash     Body aches       Prior to Admission medications    Medication Sig Start Date End Date Taking? Authorizing Provider   finasteride (PROSCAR) 5 mg tablet TAKE 1 TABLET BY MOUTH DAILY 21  Yes Ifeanyi Munoz MD   levothyroxine (SYNTHROID) 150 mcg tablet TAKE 1 TABLET BY MOUTH DAILY BEFORE BREAKFAST 21  Yes Ifeanyi Munoz MD   tamsulosin (FLOMAX) 0.4 mg capsule TAKE 1 CAPSULE BY MOUTH DAILY 21  Yes Ifeanyi Munoz MD   cyanocobalamin (VITAMIN B-12) 1,000 mcg tablet Take 1,000 mcg by mouth daily. Yes Provider, Historical   clonazePAM (KLONOPIN) 0.5 mg tablet Take 0.5 mg by mouth nightly. Yes Provider, Historical   aspirin delayed-release 81 mg tablet Take 1 Tab by mouth daily. 17  Yes Ifeanyi Munoz MD   multivitamin (ONE A DAY) tablet Take 1 Tab by mouth daily.    Yes Provider, Historical       Past Medical History:   Diagnosis Date    Abdominal lymphadenopathy 2002    Dr. Daniel.  lymph node enlargement near pancreas. EUS biopsy 1/22/02 benign, but \"possible pneumonia\"    B12 deficiency     Cataract     Dr. Katia Coleman Chronic anxiety     Coronary artery calcification seen on CT scan 04/11/2016    patient with LOW cholesterol. A.  EBT (10/4/10):  LM 8, , LCx 0, . Total = 331.  Degenerative joint disease (DJD) of lumbar spine     MRI 3/2017 shows moderate disease and mild stenosis    Environmental allergies     Dr. Shirley Gregorio. also med allergies ibuprofen    Essential tremor     FH: colon cancer     mother    Hepatitis A     as child    Hypospadias     Hypothyroidism 04/11/2016    saw Dr. Ozzy Su int he past    IFG (impaired fasting glucose) 04/06/2016    a1c 6.7%    Lymphadenopathy 2002    left axillary biopsy 2/14/02 - benign. Dr. Daniel, Dr. Luca Le Normal cardiac stress test 04/2016    Dr. Christiane Decker Obstructive sleep apnea     on CPAP. Dr. Torrie Snellen Plantar fasciitis     Dr. Jessica Temple.  hx R cortisone injection    Prostatic enlargement 12/6/2017    PVC (premature ventricular contraction)     REM sleep behavior disorder     Screening for skin cancer     sees derm Dr. Ely Dougherty Septic shock ([facility] [address] 04/04/2002    JW    Sleep apnea     Spinal stenosis of lumbar region at multiple levels 12/6/2017    Thrombocytopenia (HCC)     Trigger finger     Dr. Orozco Lindsay.   s/p R surgery        Past Surgical History:   Procedure Laterality Date    HX CATARACT REMOVAL Bilateral 11/2019    Dr. Katia Coleman HX COLONOSCOPY  08/13/2013    4/15/2002, 11/11/2009, 8/13/2013- Dr Nadia Rivera    HX COLONOSCOPY  11/27/2018    tubular adenoma    HX ENDOSCOPY  01/22/2002    Dr Macrina Nunez ARTHROSCOPY Left     HX LYMPHADENECTOMY  02/14/2002    mediastinoscopy, under left arm for biopsy    HX ORTHOPAEDIC Right     trigger finger    HX TONSILLECTOMY      HX VASECTOMY         Social History     Tobacco Use    Smoking status: Never Smoker    Smokeless tobacco: Never Used   Substance Use Topics    Alcohol use: No     Alcohol/week: 0.0 standard drinks        Family History   Problem Relation Age of Onset    Cancer Mother         colon    Cancer Father         lung        Review of Systems:  (bold if positive, if negative)    Gen:  Eyes:  ENT:  CVS:  Pulm:  GI:  :  dysuria, urinary frequencyMS:  Skin:  Psych:  Endo:  Hem:  Renal:  Neuro:            Objective:      VITALS:    Vital signs reviewed; most recent are:    Visit Vitals  /60 (BP 1 Location: Right upper arm, BP Patient Position: At rest)   Pulse 71   Temp 97.4 °F (36.3 °C)   Resp 19   Ht 6' 1\" (1.854 m)   Wt 95.3 kg (210 lb)   SpO2 96%   BMI 27.71 kg/m²     SpO2 Readings from Last 6 Encounters:   10/18/21 96%   04/23/21 98%   12/02/20 98%   11/11/20 94%   09/24/20 97%   12/16/19 94%        No intake or output data in the 24 hours ending 10/18/21 1648     Exam:     Physical Exam:    Gen:  Well-developed, well-nourished, in no acute distress  HEENT:  Pink conjunctivae, PERRL, hearing intact to voice, moist mucous membranes  Neck:  Supple, no tenderness to palpation   Resp:  No accessory muscle use, clear breath sounds without wheezes rales or rhonchi  Card:  RRR, No murmurs, normal S1, S2, no peripheral edema  Abd:  Soft, non-tender, non-distended, normoactive bowel sounds are present  Musc:  No cyanosis or clubbing  Skin:  No rashes or ulcers, skin turgor is good  Neuro:  Cranial nerves 3-12 are grossly intact,  follows commands appropriately  Psych:  Oriented to person, place, and time, Alert with good insight      Labs:    Recent Labs     10/18/21  0935   WBC 10.0   HGB 12.6   HCT 37.3   *     Recent Labs     10/18/21  0935      K 4.1      CO2 20*   *   BUN 25*   CREA 1.53*   CA 8.4*   ALB 2.7*   TBILI 2.8*   ALT 73     No results found for: GLUCPOC  No results for input(s): PH, PCO2, PO2, HCO3, FIO2 in the last 72 hours. No results for input(s): INR, INREXT, INREXT in the last 72 hours. Radiology and EKG reviewed: Chest x-ray without any acute concerns   Old Records reviewed in [facility]       Assessment/Plan:           Hematuria (10/18/2021): Suspect may be due to UTI. Trend hemoglobin. Treat suspected urinary tract infection. Consult urology. Acute kidney injury: Suspect due to infection and volume depletion. Treat suspected UTI. Obtain ultrasound. IVF. Pyuria (10/18/2021)/  Hx of Prostatic enlargement (12/6/2017)/ Hypotension : Not septic. Obtain urine culture. IV Rocephin. IVF. Obstructive sleep apnea (4/11/2016): No acute concerns. Hypothyroidism (4/11/2016): Check TSH. Continue home Synthroid.       Risk of deterioration: high      Total time spent with patient: 48 Minutes **I personally saw and examined the patient during this time period**                 Care Plan discussed with: Patient, Family and Nursing Staff    Discussed:  Code Status and Care Plan    Prophylaxis:  SCD's    Probable Disposition:  Home w/Family           ___________________________________________________    Attending Physician: Eden Giordano DO

## 2021-10-19 ENCOUNTER — HOSPITAL ENCOUNTER (INPATIENT)
Dept: ULTRASOUND IMAGING | Age: 76
Discharge: HOME | DRG: 690 | End: 2021-10-19
Attending: INTERNAL MEDICINE
Payer: MEDICARE

## 2021-10-19 LAB
ALBUMIN SERPL-MCNC: 2.4 G/DL (ref 3.5–5)
ALBUMIN/GLOB SERPL: 0.7 {RATIO} (ref 1.1–2.2)
ALP SERPL-CCNC: 142 U/L (ref 45–117)
ALT SERPL-CCNC: 53 U/L (ref 12–78)
ANION GAP SERPL CALC-SCNC: 5 MMOL/L (ref 5–15)
AST SERPL-CCNC: 48 U/L (ref 15–37)
BASOPHILS # BLD: 0 K/UL (ref 0–0.1)
BASOPHILS NFR BLD: 0 % (ref 0–1)
BILIRUB SERPL-MCNC: 1.8 MG/DL (ref 0.2–1)
BUN SERPL-MCNC: 25 MG/DL (ref 6–20)
BUN/CREAT SERPL: 22 (ref 12–20)
CALCIUM SERPL-MCNC: 7.7 MG/DL (ref 8.5–10.1)
CHLORIDE SERPL-SCNC: 111 MMOL/L (ref 97–108)
CO2 SERPL-SCNC: 24 MMOL/L (ref 21–32)
CREAT SERPL-MCNC: 1.13 MG/DL (ref 0.7–1.3)
DIFFERENTIAL METHOD BLD: ABNORMAL
EOSINOPHIL # BLD: 0 K/UL (ref 0–0.4)
EOSINOPHIL NFR BLD: 0 % (ref 0–7)
ERYTHROCYTE [DISTWIDTH] IN BLOOD BY AUTOMATED COUNT: 14.6 % (ref 11.5–14.5)
FERRITIN SERPL-MCNC: 96 NG/ML (ref 26–388)
FOLATE SERPL-MCNC: 16.3 NG/ML (ref 5–21)
GLOBULIN SER CALC-MCNC: 3.6 G/DL (ref 2–4)
GLUCOSE SERPL-MCNC: 148 MG/DL (ref 65–100)
HAV IGM SER QL: NONREACTIVE
HBV CORE IGM SER QL: NONREACTIVE
HBV SURFACE AG SER QL: 0.31 INDEX
HBV SURFACE AG SER QL: NEGATIVE
HCT VFR BLD AUTO: 33.8 % (ref 36.6–50.3)
HCV AB SERPL QL IA: NONREACTIVE
HGB BLD-MCNC: 11.3 G/DL (ref 12.1–17)
IMM GRANULOCYTES # BLD AUTO: 0 K/UL
IMM GRANULOCYTES NFR BLD AUTO: 0 %
IRON SATN MFR SERPL: 8 % (ref 20–50)
IRON SERPL-MCNC: 20 UG/DL (ref 35–150)
LYMPHOCYTES # BLD: 0.4 K/UL (ref 0.8–3.5)
LYMPHOCYTES NFR BLD: 4 % (ref 12–49)
MCH RBC QN AUTO: 31.3 PG (ref 26–34)
MCHC RBC AUTO-ENTMCNC: 33.4 G/DL (ref 30–36.5)
MCV RBC AUTO: 93.6 FL (ref 80–99)
MONOCYTES # BLD: 1.3 K/UL (ref 0–1)
MONOCYTES NFR BLD: 12 % (ref 5–13)
NEUTS BAND NFR BLD MANUAL: 13 % (ref 0–6)
NEUTS SEG # BLD: 9.4 K/UL (ref 1.8–8)
NEUTS SEG NFR BLD: 71 % (ref 32–75)
NRBC # BLD: 0 K/UL (ref 0–0.01)
NRBC BLD-RTO: 0 PER 100 WBC
PLATELET # BLD AUTO: 88 K/UL (ref 150–400)
PMV BLD AUTO: 10.1 FL (ref 8.9–12.9)
POTASSIUM SERPL-SCNC: 4 MMOL/L (ref 3.5–5.1)
PROT SERPL-MCNC: 6 G/DL (ref 6.4–8.2)
RBC # BLD AUTO: 3.61 M/UL (ref 4.1–5.7)
RBC MORPH BLD: ABNORMAL
SODIUM SERPL-SCNC: 140 MMOL/L (ref 136–145)
SP1: NORMAL
SP2: NORMAL
SP3: NORMAL
T4 FREE SERPL-MCNC: 1.3 NG/DL (ref 0.8–1.5)
TIBC SERPL-MCNC: 254 UG/DL (ref 250–450)
WBC # BLD AUTO: 11.1 K/UL (ref 4.1–11.1)

## 2021-10-19 PROCEDURE — 85025 COMPLETE CBC W/AUTO DIFF WBC: CPT

## 2021-10-19 PROCEDURE — 76705 ECHO EXAM OF ABDOMEN: CPT

## 2021-10-19 PROCEDURE — 74011000258 HC RX REV CODE- 258: Performed by: INTERNAL MEDICINE

## 2021-10-19 PROCEDURE — 83540 ASSAY OF IRON: CPT

## 2021-10-19 PROCEDURE — 65660000000 HC RM CCU STEPDOWN

## 2021-10-19 PROCEDURE — 82746 ASSAY OF FOLIC ACID SERUM: CPT

## 2021-10-19 PROCEDURE — 84439 ASSAY OF FREE THYROXINE: CPT

## 2021-10-19 PROCEDURE — 76700 US EXAM ABDOM COMPLETE: CPT

## 2021-10-19 PROCEDURE — 74011000250 HC RX REV CODE- 250: Performed by: INTERNAL MEDICINE

## 2021-10-19 PROCEDURE — 82728 ASSAY OF FERRITIN: CPT

## 2021-10-19 PROCEDURE — 36415 COLL VENOUS BLD VENIPUNCTURE: CPT

## 2021-10-19 PROCEDURE — 80053 COMPREHEN METABOLIC PANEL: CPT

## 2021-10-19 PROCEDURE — 80074 ACUTE HEPATITIS PANEL: CPT

## 2021-10-19 PROCEDURE — 74011250637 HC RX REV CODE- 250/637: Performed by: INTERNAL MEDICINE

## 2021-10-19 PROCEDURE — 74011250636 HC RX REV CODE- 250/636: Performed by: INTERNAL MEDICINE

## 2021-10-19 RX ADMIN — Medication 10 ML: at 22:31

## 2021-10-19 RX ADMIN — DEXTROSE AND SODIUM CHLORIDE 50 ML/HR: 5; 900 INJECTION, SOLUTION INTRAVENOUS at 10:02

## 2021-10-19 RX ADMIN — TAMSULOSIN HYDROCHLORIDE 0.4 MG: 0.4 CAPSULE ORAL at 10:02

## 2021-10-19 RX ADMIN — CEFTRIAXONE 1 G: 1 INJECTION, POWDER, FOR SOLUTION INTRAMUSCULAR; INTRAVENOUS at 12:17

## 2021-10-19 RX ADMIN — FINASTERIDE 5 MG: 5 TABLET, FILM COATED ORAL at 10:02

## 2021-10-19 NOTE — PROGRESS NOTES
10/19/2021  5:22 PM  Case management note    Reason for Admission:  Hematuria    Patient came to ED for fever and dysuria. Patient is , independent with ADL's and drives. Patient has history of colon CA, hep A, ROXANNE and DJD  Patient has appointment with [facility], date on chart. Katia @ [facility]                     RUR Score:      11%               Plan for utilizing home health:      Patient is independent and will most likely not qualify for home health services    PCP: First and Last name:  Garett Lai MD     Name of Practice:    Are you a current patient: Yes/No: yes   Approximate date of last visit: `18 months   Can you participate in a virtual visit with your PCP:                     Current Advanced Directive/Advance Care Plan: Full Code  NO AD      Healthcare Decision Maker:   Kadi Florian                              Transition of Care Plan:            1. Home with family assistance  2. PCP follow up  3. Urology follow up (appointment already set up)  4. AD planning  5. CM to follow for discharge needs    Care Management Interventions  PCP Verified by CM:  Yes (Dr. Bridget Ni)  Mode of Transport at Discharge: T[address] Ed[address] 59: Spouse/Significant Other  Confirm Follow Up Transport: Family  The Plan for Transition of Care is Related to the Following Treatment Goals : hematuria  Discharge Location  Discharge Placement: Home with family assistance  Suman Haley

## 2021-10-19 NOTE — ED NOTES
Verbal shift change report given to CHANO Wong (oncoming nurse) by Gregorio Castro RN (offgoing nurse). Report included the following information SBAR, ED Summary, Intake/Output, MAR and Recent Results.

## 2021-10-19 NOTE — ED NOTES
Urology came for consult- report they will sign off on the case; recommend follow up as an outpatient

## 2021-10-19 NOTE — ED NOTES
Verbal shift change report given to Dana Chun RN (oncoming nurse) by CHANO Wong (offgoing nurse). Report included the following information SBAR, ED Summary, Intake/Output, MAR and Recent Results.

## 2021-10-19 NOTE — CONSULTS
New Urology Consult Note    Patient: Cheryl Reid MRN: [id number]  SSN: [id number]    YOB: 1945  Age: 64 y.o. Sex: male            Assessment:     Cheryl Reid is a 64 y.o. male with history of BPH on flomax and finasteride who presented to the ED with complaints of dysuria, frequency and fevers for about 5 days. He is currently admitted for UTI, microscopic hematuria, GAETANO. He is not an established patient of [facility]. Recommendations:     1. Urine culture pending. Continue culture directed abx. Would not recommend cystoscopy with active infection unless urgent need. Not urgent at this time. 2. Creatinine improved, 1.13 today. 3. Continue finasteride and flomax. 4. Check PVR. Call if > 400cc. 5. Will arrange outpatient follow up for 1-2 weeks. Will sign off. Please call if needed. Thank you for this consult. Please contact [facility] with any further questions/concerns. Saintclair Grimmer, NP [phone]    History of Present Illness:     Chief Complaint:  Dysuria and fever    Cheryl Reid is seen in consultation for reasons noted above at the request of Eliseo Pettit DO. This is a 64 y.o. male with a history of enlarged prostate on finasteride and flomax (by PCP) who presented to the ED with complaints of fevers, dysuria and frequency x 5 days. This was constant. Not associated with gross hematuria. No abdominal pain. No sensation of incomplete emptying. He is feeling better in the ED following IV abx. Renal US without hydronephrosis or bladder distention  Afebrile and OVSS  WBC 11.1  Creatinine 1.13 (1.53)  Urine culture pending  PSA in 2020 from PCP was 0.4      Subjective     Past Medical History  Past Medical History:   Diagnosis Date    Abdominal lymphadenopathy 2002    Dr. Daniel.  lymph node enlargement near pancreas.   EUS biopsy 1/22/02 benign, but \"possible pneumonia\"    B12 deficiency     Cataract     Dr. Ele Murphy Chronic anxiety     Coronary artery calcification seen on CT scan 04/11/2016    patient with LOW cholesterol. A.  EBT (10/4/10):  LM 8, , LCx 0, . Total = 331.  Degenerative joint disease (DJD) of lumbar spine     MRI 3/2017 shows moderate disease and mild stenosis    Environmental allergies     Dr. Freda Kumar. also med allergies ibuprofen    Essential tremor     FH: colon cancer     mother    Hepatitis A     as child    Hypospadias     Hypothyroidism 04/11/2016    saw Dr. Aby Smith int he past    IFG (impaired fasting glucose) 04/06/2016    a1c 6.7%    Lymphadenopathy 2002    left axillary biopsy 2/14/02 - benign. Dr. Daniel, Dr. Lisa Wilson Normal cardiac stress test 04/2016    Dr. Reshma Franco Obstructive sleep apnea     on CPAP. Dr. Ula Gaucher Plantar fasciitis     Dr. Amy Ward.  hx R cortisone injection    Prostatic enlargement 12/6/2017    PVC (premature ventricular contraction)     REM sleep behavior disorder     Screening for skin cancer     sees derm Dr. Curry Pham Septic shock ([facility] [address] 04/04/2002    JW    Sleep apnea     Spinal stenosis of lumbar region at multiple levels 12/6/2017    Thrombocytopenia (HCC)     Trigger finger     Dr. Webster Snellen.   s/p R surgery       Past Surgical History:   Past Surgical History:   Procedure Laterality Date    HX CATARACT REMOVAL Bilateral 11/2019    Dr. Vilma Heart HX COLONOSCOPY  08/13/2013    4/15/2002, 11/11/2009, 8/13/2013- Dr Gabriela Bob    HX COLONOSCOPY  11/27/2018    tubular adenoma    HX ENDOSCOPY  01/22/2002    Dr Gabriela Bob    HX KNEE ARTHROSCOPY Left     HX LYMPHADENECTOMY  02/14/2002    mediastinoscopy, under left arm for biopsy    HX ORTHOPAEDIC Right     trigger finger    HX TONSILLECTOMY      HX VASECTOMY         Medication:  Current Facility-Administered Medications   Medication Dose Route Frequency Provider Last Rate Last Admin    sodium chloride (NS) flush 5-40 mL  5-40 mL IntraVENous Q8H Sophie Angel, DO   10 mL at 10/18/21 0216  sodium chloride (NS) flush 5-40 mL  5-40 mL IntraVENous PRN Gaurav Kamlesh, DO        polyethylene glycol (MIRALAX) packet 17 g  17 g Oral DAILY PRN Gaurav Kamlesh, DO        ondansetron (ZOFRAN ODT) tablet 4 mg  4 mg Oral Q8H PRN Gaurav Kamlesh, DO        Or    ondansetron (ZOFRAN) injection 4 mg  4 mg IntraVENous Q6H PRN Marivel Angela, DO        dextrose 5% and 0.9% NaCl infusion  50 mL/hr IntraVENous CONTINUOUS Gaurav Kamlesh, DO 50 mL/hr at 10/19/21 1002 50 mL/hr at 10/19/21 1002    cefTRIAXone (ROCEPHIN) 1 g in sterile water (preservative free) 10 mL IV syringe  1 g IntraVENous Q24H Sophie Angel, DO        tamsulosin (FLOMAX) capsule 0.4 mg  0.4 mg Oral DAILY Gaurav Kamlesh, DO   0.4 mg at 10/19/21 1002    finasteride (PROSCAR) tablet 5 mg  5 mg Oral DAILY Marivel Angela, DO   5 mg at 10/19/21 1002     Current Outpatient Medications   Medication Sig Dispense Refill    fexofenadine-pseudoephedrine (Allegra-D 24 Hour) 180-240 mg per tablet Take 1 Tablet by mouth daily.  guaiFENesin ER (Mucinex) 600 mg ER tablet Take 600 mg by mouth two (2) times a day.  finasteride (PROSCAR) 5 mg tablet TAKE 1 TABLET BY MOUTH DAILY 90 Tablet 1    levothyroxine (SYNTHROID) 150 mcg tablet TAKE 1 TABLET BY MOUTH DAILY BEFORE BREAKFAST 90 Tablet 1    tamsulosin (FLOMAX) 0.4 mg capsule TAKE 1 CAPSULE BY MOUTH DAILY 90 Cap 1    cyanocobalamin (VITAMIN B-12) 1,000 mcg tablet Take 1,000 mcg by mouth daily.  clonazePAM (KLONOPIN) 0.5 mg tablet Take 0.5 mg by mouth nightly.  aspirin delayed-release 81 mg tablet Take 1 Tab by mouth daily. 30 Tab 11    multivitamin (ONE A DAY) tablet Take 1 Tab by mouth daily. Allergies:   Allergies   Allergen Reactions    Advil [Ibuprofen] Hives    Amitex Pse [Pseudoephedrine-Guaifenesin] Other (comments)     Dry mouth    Hydrocodone-Acetaminophen Rash, Itching and Swelling    Levaquin [Levofloxacin] Rash     Body aches       Social History:  Social History     Tobacco Use    Smoking status: Never Smoker    Smokeless tobacco: Never Used   Substance Use Topics    Alcohol use: No     Alcohol/week: 0.0 standard drinks    Drug use: No       Family History  Family History   Problem Relation Age of Onset    Cancer Mother         colon    Cancer Father         lung       Review of Systems  Unchanged from admitting provider note from 10/18/2021 other than HPI    Objective:     Vital signs in last 24 hours:  Visit Vitals  BP (!) 121/59 (BP 1 Location: Right upper arm, BP Patient Position: At rest;Sitting)   Pulse 73   Temp 98 °F (36.7 °C)   Resp 16   Ht 6' 1\" (1.854 m)   Wt 95.3 kg (210 lb)   SpO2 99%   BMI 27.71 kg/m²       Intake/Output last 3 shifts:  Date 10/18/21 0700 - 10/19/21 0659 10/19/21 0700 - 10/20/21 0659   Shift 0754-8526 8329-5045 24 Hour Total 9791-9994 1129-2959 24 Hour Total   INTAKE   I.V.(mL/kg/hr)    1000  1000     Volume (dextrose 5% and 0.9% NaCl infusion)    1000  1000   Shift Total(mL/kg)    1000(10.5)  1000(10.5)   OUTPUT   Shift Total(mL/kg)         NET    1000  1000   Weight (kg) 95.3 95.3 95.3 95.3 95.3 95.3       Physical Exam  General Appearance: NAD, awake  HENT: atraumatic, normal ears  Cardiovascular: not tachycardic, no LE edema  Respiratory: no distress, room air  Abdomen: soft, no suprapubic fullness or tenderness  : no CVA tenderness  Extremities: moves all  Musculoskeletal: normal alignment of neck and head  Neuro: Appropriate, no focal neurological deficits  Mood/Affect: appropriate, A&O x 3    Lab/Imaging Review:       Most Recent Labs:  Lab Results   Component Value Date/Time    WBC 11.1 10/19/2021 05:29 AM    HGB 11.3 (L) 10/19/2021 05:29 AM    HCT 33.8 (L) 10/19/2021 05:29 AM    PLATELET 88 (L) 90/83/2242 05:29 AM    MCV 93.6 10/19/2021 05:29 AM        Lab Results   Component Value Date/Time    Sodium 140 10/19/2021 05:29 AM    Potassium 4.0 10/19/2021 05:29 AM    Chloride 111 (H) 10/19/2021 05:29 AM    CO2 24 10/19/2021 05:29 AM    Anion gap 5 10/19/2021 05:29 AM    Glucose 148 (H) 10/19/2021 05:29 AM    BUN 25 (H) 10/19/2021 05:29 AM    Creatinine 1.13 10/19/2021 05:29 AM    BUN/Creatinine ratio 22 (H) 10/19/2021 05:29 AM    GFR est AA >60 10/19/2021 05:29 AM    GFR est non-AA >60 10/19/2021 05:29 AM    Calcium 7.7 (L) 10/19/2021 05:29 AM    Bilirubin, total 1.8 (H) 10/19/2021 05:29 AM    Alk. phosphatase 142 (H) 10/19/2021 05:29 AM    Protein, total 6.0 (L) 10/19/2021 05:29 AM    Albumin 2.4 (L) 10/19/2021 05:29 AM    Globulin 3.6 10/19/2021 05:29 AM    A-G Ratio 0.7 (L) 10/19/2021 05:29 AM    ALT (SGPT) 53 10/19/2021 05:29 AM        Lab Results   Component Value Date/Time    Prostate Specific Ag 0.4 09/24/2020 09:22 AM    Prostate Specific Ag 0.4 09/26/2019 02:14 PM    Prostate Specific Ag 1.0 05/24/2018 02:25 PM        COAGS:  No results found for: APTT, PTP, INR, INREXT    Lab Results   Component Value Date/Time    Hemoglobin A1c 5.9 (H) 09/24/2020 09:22 AM        No results found for: CPK, RCK1, RCK2, RCK3, RCK4, CKMB, CKNDX, CKND1, TROPT, TROIQ, BNPP, BNP       Urine/Blood Cultures:  Results     Procedure Component Value Units Date/Time    CULTURE, URINE [[id number]] Collected: 10/18/21 1330    Order Status: Canceled Specimen: Cath Urine     CULTURE, URINE [[id number]]     Order Status: Canceled Specimen: Cath Urine     URINE CULTURE HOLD SAMPLE [[id number]] Collected: 10/18/21 0916    Order Status: Completed Specimen: Urine from Serum Updated: 10/18/21 0953     Urine culture hold       Urine on hold in Microbiology dept for 2 days. If unpreserved urine is submitted, it cannot be used for addtional testing after 24 hours, recollection will be required.           CULTURE, URINE [[id number]]  (Abnormal) Collected: 10/18/21 0935    Order Status: Completed Specimen: Urine from Clean catch Updated: 10/19/21 0954     Special Requests: NO SPECIAL REQUESTS        [place] Count --        >100,000  COLONIES/mL       Culture result: GRAM NEGATIVE RODS IMAGING:  US ABD COMP    Result Date: 10/19/2021  INDICATION: elevated bili, lfts COMPARISON: None TECHNIQUE: Routine ultrasound images of the abdomen were obtained. FINDINGS: LIVER: Heterogeneous echotexture and nodular border. No focal lesion. MAIN PORTAL VEIN: Patent with appropriate hepatopetal flow direction. GALLBLADDER: No gallstones, gallbladder wall thickening, or pericholecystic fluid. COMMON BILE DUCT: 4 mm in diameter. No significant dilatation. PANCREAS: Not well visualized due to bowel gas though visualized portions are unremarkable. SPLEEN: 14.5 cm in length. Enlarged. RIGHT KIDNEY: 13.3 cm in length. No hydronephrosis or nephrolithiasis. 1.8 x 1.6 cm lower pole cyst. LEFT KIDNEY: 12.9 cm in length. No hydronephrosis or nephrolithiasis. AORTA/IVC: Visualized portions are normal. OTHER: N/A.     1. Heterogeneous echotexture of the liver with nodular border suggesting cirrhosis. No focal lesion by ultrasound, correlate clinically. 2. Splenomegaly. 3. Right renal cyst.    US RETROPERITONEUM COMP    Result Date: 10/18/2021  EXAM: US RETROPERITONEUM COMP INDICATION: Rule out hydronephrosis COMPARISON: MRI lumbar spine 4/27/2021 TECHNIQUE: Ultrasound of the kidneys and urinary bladder. FINDINGS: The kidneys are normal in appearance and symmetric in size. The right and left kidneys measure 12.8 and 12.7 cm, respectively. No hydronephrosis. There is no nephrolithiasis or renal mass. 1.9 cm x 1.8 cm x 1.4 cm right lower pole cyst. The cortical thickness and echogenicity are preserved. The corticomedullary differentiation is within normal limits. The bladder is underdistended. Prostatomegaly. Visualized aorta and IVC are within normal limits. 1.  No hydronephrosis. Right lower pole renal cyst. 2.  Prostatomegaly.            Signed By: Elia Matute NP  - October 19, 2021

## 2021-10-19 NOTE — ED NOTES
Bedside and Verbal shift change report given to Marguerite Crane (oncoming nurse) by Lorenzo Carey RN (offgoing nurse). Report included the following information SBAR, Kardex, ED Summary, Intake/Output, MAR and Recent Results.

## 2021-10-19 NOTE — ED NOTES
Bedside and Verbal shift change report given to [facility] (oncoming nurse) by Sergio Kay (offgoing nurse). Report included the following information SBAR, Kardex, ED Summary and MAR.

## 2021-10-19 NOTE — PROGRESS NOTES
[facility]  [facility] [facility], [address] [place] [address]  [phone]      Medical Progress Note      NAME: Laurie Vazquez   :  1945  MRM:  [id number]    Date/Time of service: 10/19/2021  3:17 PM       Subjective:     Chief Complaint:  Patient was personally seen and examined by me during this time period. Chart reviewed. F/u GAETANO/ UTI. Reports improved fevers, no pain. Objective:       Vitals:       Last 24hrs VS reviewed since prior progress note.  Most recent are:    Visit Vitals  BP (!) 114/52 (BP 1 Location: Right arm, BP Patient Position: At rest)   Pulse 72   Temp 98 °F (36.7 °C)   Resp 21   Ht 6' 1\" (1.854 m)   Wt 95.3 kg (210 lb)   SpO2 97%   BMI 27.71 kg/m²     SpO2 Readings from Last 6 Encounters:   10/19/21 97%   21 98%   20 98%   20 94%   20 97%   19 94%            Intake/Output Summary (Last 24 hours) at 10/19/2021 1517  Last data filed at 10/19/2021 1001  Gross per 24 hour   Intake 1000 ml   Output --   Net 1000 ml        Exam:     Physical Exam:    Gen:  Well-developed, well-nourished, in no acute distress  HEENT:  Pink conjunctivae, PERRL, hearing intact to voice, moist mucous membranes  Neck:  Supple, no tenderness to palpation   Resp:  No accessory muscle use, clear breath sounds without wheezes rales or rhonchi  Card:  RRR, No murmurs, normal S1, S2, no peripheral edema  Abd:  Soft, non-tender, non-distended, normoactive bowel sounds are present  Musc:  No cyanosis or clubbing  Skin:  No rashes or ulcers, skin turgor is good  Neuro:  Cranial nerves 3-12 are grossly intact, follows commands appropriately  Psych:  Oriented to person, place, and time, Alert with good insight      Medications Reviewed: (see below)    Lab Data Reviewed: (see below)    ______________________________________________________________________    Medications:     Current Facility-Administered Medications   Medication Dose Route Frequency    sodium chloride (NS) flush 5-40 mL  5-40 mL IntraVENous Q8H    sodium chloride (NS) flush 5-40 mL  5-40 mL IntraVENous PRN    polyethylene glycol (MIRALAX) packet 17 g  17 g Oral DAILY PRN    ondansetron (ZOFRAN ODT) tablet 4 mg  4 mg Oral Q8H PRN    Or    ondansetron (ZOFRAN) injection 4 mg  4 mg IntraVENous Q6H PRN    dextrose 5% and 0.9% NaCl infusion  50 mL/hr IntraVENous CONTINUOUS    cefTRIAXone (ROCEPHIN) 1 g in sterile water (preservative free) 10 mL IV syringe  1 g IntraVENous Q24H    tamsulosin (FLOMAX) capsule 0.4 mg  0.4 mg Oral DAILY    finasteride (PROSCAR) tablet 5 mg  5 mg Oral DAILY     Current Outpatient Medications   Medication Sig    fexofenadine-pseudoephedrine (Allegra-D 24 Hour) 180-240 mg per tablet Take 1 Tablet by mouth daily.  guaiFENesin ER (Mucinex) 600 mg ER tablet Take 600 mg by mouth two (2) times a day.  finasteride (PROSCAR) 5 mg tablet TAKE 1 TABLET BY MOUTH DAILY    levothyroxine (SYNTHROID) 150 mcg tablet TAKE 1 TABLET BY MOUTH DAILY BEFORE BREAKFAST    tamsulosin (FLOMAX) 0.4 mg capsule TAKE 1 CAPSULE BY MOUTH DAILY    cyanocobalamin (VITAMIN B-12) 1,000 mcg tablet Take 1,000 mcg by mouth daily.  clonazePAM (KLONOPIN) 0.5 mg tablet Take 0.5 mg by mouth nightly.  aspirin delayed-release 81 mg tablet Take 1 Tab by mouth daily.  multivitamin (ONE A DAY) tablet Take 1 Tab by mouth daily. Lab Review:     Recent Labs     10/19/21  0529 10/18/21  0935   WBC 11.1 10.0   HGB 11.3* 12.6   HCT 33.8* 37.3   PLT 88* 111*     Recent Labs     10/19/21  0529 10/18/21  0935    136   K 4.0 4.1   * 107   CO2 24 20*   * 263*   BUN 25* 25*   CREA 1.13 1.53*   CA 7.7* 8.4*   ALB 2.4* 2.7*   TBILI 1.8* 2.8*   ALT 53 73     No results found for: GLUCPOC       Assessment / Plan:       Hematuria (10/18/2021): Suspect may be due to UTI. Trend hemoglobin. Treat suspected urinary tract infection.   Urology evaluated; no acute concerns, f/u with urology OP.        Acute kidney injury: Suspect due to infection and volume depletion. Treat suspected UTI. Ultrasound w/o any acute needs. IVF.       UTI /  Hx of Prostatic enlargement (12/6/2017)/ Hypotension : Not septic. urine culture with gram neg rods; waiting speciation and sensitives. IV Rocephin. IVF. Continue home Flomax and finasteride. Renal cyst: f/u urology OP.       Obstructive sleep apnea (4/11/2016): No acute concerns.       Hypothyroidism (4/11/2016): TSH low, but t4 normal.  Continue home Synthroid.     Total time spent with patient: 32 Minutes **I personally saw and examined the patient during this time period**                 Care Plan discussed with: Patient and Nursing Staff    Discussed:  Care Plan    Prophylaxis:  SCD's    Disposition:  Home w/Family           ___________________________________________________    Attending Physician: Yury Villa DO

## 2021-10-20 VITALS
RESPIRATION RATE: 18 BRPM | OXYGEN SATURATION: 94 % | HEIGHT: 73 IN | SYSTOLIC BLOOD PRESSURE: 127 MMHG | BODY MASS INDEX: 27.83 KG/M2 | TEMPERATURE: 98.4 F | HEART RATE: 66 BPM | DIASTOLIC BLOOD PRESSURE: 71 MMHG | WEIGHT: 210 LBS

## 2021-10-20 LAB
ALBUMIN SERPL-MCNC: 2.4 G/DL (ref 3.5–5)
ALBUMIN/GLOB SERPL: 0.6 {RATIO} (ref 1.1–2.2)
ALP SERPL-CCNC: 139 U/L (ref 45–117)
ALT SERPL-CCNC: 40 U/L (ref 12–78)
ANION GAP SERPL CALC-SCNC: 6 MMOL/L (ref 5–15)
AST SERPL-CCNC: 40 U/L (ref 15–37)
BACTERIA SPEC CULT: ABNORMAL
BASOPHILS # BLD: 0.1 K/UL (ref 0–0.1)
BASOPHILS NFR BLD: 1 % (ref 0–1)
BILIRUB SERPL-MCNC: 2 MG/DL (ref 0.2–1)
BUN SERPL-MCNC: 21 MG/DL (ref 6–20)
BUN/CREAT SERPL: 18 (ref 12–20)
CALCIUM SERPL-MCNC: 8 MG/DL (ref 8.5–10.1)
CC UR VC: ABNORMAL
CHLORIDE SERPL-SCNC: 110 MMOL/L (ref 97–108)
CO2 SERPL-SCNC: 23 MMOL/L (ref 21–32)
CREAT SERPL-MCNC: 1.16 MG/DL (ref 0.7–1.3)
DIFFERENTIAL METHOD BLD: ABNORMAL
EOSINOPHIL # BLD: 0.1 K/UL (ref 0–0.4)
EOSINOPHIL NFR BLD: 1 % (ref 0–7)
ERYTHROCYTE [DISTWIDTH] IN BLOOD BY AUTOMATED COUNT: 14.3 % (ref 11.5–14.5)
GLOBULIN SER CALC-MCNC: 3.9 G/DL (ref 2–4)
GLUCOSE SERPL-MCNC: 108 MG/DL (ref 65–100)
HCT VFR BLD AUTO: 34.6 % (ref 36.6–50.3)
HGB BLD-MCNC: 11.7 G/DL (ref 12.1–17)
IMM GRANULOCYTES # BLD AUTO: 0.1 K/UL (ref 0–0.04)
IMM GRANULOCYTES NFR BLD AUTO: 1 % (ref 0–0.5)
LYMPHOCYTES # BLD: 1 K/UL (ref 0.8–3.5)
LYMPHOCYTES NFR BLD: 12 % (ref 12–49)
MCH RBC QN AUTO: 31.9 PG (ref 26–34)
MCHC RBC AUTO-ENTMCNC: 33.8 G/DL (ref 30–36.5)
MCV RBC AUTO: 94.3 FL (ref 80–99)
MONOCYTES # BLD: 1.1 K/UL (ref 0–1)
MONOCYTES NFR BLD: 13 % (ref 5–13)
NEUTS SEG # BLD: 5.8 K/UL (ref 1.8–8)
NEUTS SEG NFR BLD: 72 % (ref 32–75)
NRBC # BLD: 0 K/UL (ref 0–0.01)
NRBC BLD-RTO: 0 PER 100 WBC
PLATELET # BLD AUTO: 89 K/UL (ref 150–400)
PMV BLD AUTO: 10.2 FL (ref 8.9–12.9)
POTASSIUM SERPL-SCNC: 4.1 MMOL/L (ref 3.5–5.1)
PROT SERPL-MCNC: 6.3 G/DL (ref 6.4–8.2)
RBC # BLD AUTO: 3.67 M/UL (ref 4.1–5.7)
RBC MORPH BLD: ABNORMAL
SERVICE CMNT-IMP: ABNORMAL
SODIUM SERPL-SCNC: 139 MMOL/L (ref 136–145)
WBC # BLD AUTO: 8.2 K/UL (ref 4.1–11.1)

## 2021-10-20 PROCEDURE — 80053 COMPREHEN METABOLIC PANEL: CPT

## 2021-10-20 PROCEDURE — 74011250637 HC RX REV CODE- 250/637: Performed by: INTERNAL MEDICINE

## 2021-10-20 PROCEDURE — 51798 US URINE CAPACITY MEASURE: CPT

## 2021-10-20 PROCEDURE — 85025 COMPLETE CBC W/AUTO DIFF WBC: CPT

## 2021-10-20 PROCEDURE — 74011250636 HC RX REV CODE- 250/636: Performed by: INTERNAL MEDICINE

## 2021-10-20 PROCEDURE — 36415 COLL VENOUS BLD VENIPUNCTURE: CPT

## 2021-10-20 PROCEDURE — 74011000250 HC RX REV CODE- 250: Performed by: INTERNAL MEDICINE

## 2021-10-20 RX ORDER — CEFDINIR 300 MG/1
300 CAPSULE ORAL 2 TIMES DAILY
Qty: 14 CAPSULE | Refills: 0 | Status: SHIPPED | OUTPATIENT
Start: 2021-10-21 | End: 2021-10-28

## 2021-10-20 RX ADMIN — FINASTERIDE 5 MG: 5 TABLET, FILM COATED ORAL at 09:20

## 2021-10-20 RX ADMIN — CEFTRIAXONE 1 G: 1 INJECTION, POWDER, FOR SOLUTION INTRAMUSCULAR; INTRAVENOUS at 11:50

## 2021-10-20 RX ADMIN — TAMSULOSIN HYDROCHLORIDE 0.4 MG: 0.4 CAPSULE ORAL at 09:20

## 2021-10-20 RX ADMIN — Medication 10 ML: at 05:55

## 2021-10-20 NOTE — PROGRESS NOTES
AVS updated and printed. Patient refused flu shot. Discharge medications reviewed with patient and appropriate educational materials and side effects teaching were provided. I have reviewed discharge instructions with the patient and he verbalized understanding. AVS signed and given to patient. Wife driving patient to home. PIV x2 removed.

## 2021-10-20 NOTE — PROGRESS NOTES
10/20/2021   CARE MANAGEMENT NOTE:  CM reviewed EMR and handoff received from ER  (Ludwig Suarez.). Pt was admitted with hematuria, GAETANO, UTI. Reportedly, pt resides with his wife Raymon Perez ([phone]). RUR 11%    Transition Plan of Care:  1. Urology following for medical management  2. Plan is for pt to return home with his wife  3. Outpt f/u  4. Family will transport pt home    CM will continue to follow pt until discharged.   Damaris

## 2021-10-20 NOTE — DISCHARGE SUMMARY
[facility]  [address]  [phone]    Physician Discharge Summary     Patient ID:  Mirtha Murray  [id number]  37 y.o.  1945    Admit date: 10/18/2021    Discharge date and time: 10/20/2021 11:23 AM    Admission Diagnoses: Hematuria [R31.9]    Discharge Diagnoses:  Principal Diagnosis <principal problem not specified>                                            Active Problems:    Obstructive sleep apnea (4/11/2016)      Hypothyroidism (4/11/2016)      Prostatic enlargement (12/6/2017)      Hematuria (10/18/2021)      Pyuria (10/18/2021)         Hospital Course:      Hematuria (10/18/2021): Suspect may be due to UTI.  Hemoglobin stable. Treat UTI as below. Urology evaluated; no acute concerns, f/u with urology OP.      Acute kidney injury: Suspect due to infection and volume depletion. Resolved. Ultrasound w/o any acute needs. S/p IVF.      UTI /  Hx of Prostatic enlargement (12/6/2017)/ Hypotension : Not septic. urine culture with gram neg rods; Ecoli pan sensitive.  S/p IV Rocephin; continue one more week of omnicef on DC. Continue home Flomax and finasteride. F/u urology OP.      Thrombocytopenia: chronic, per patient he follows with his PCP for this. ABD US with concern for cirrhosis. Patient reports prior Hep infection suspect hep B based on Hep panel. F/u with GI OP. Renal cyst: f/u urology OP.       Obstructive sleep apnea (4/11/2016): No acute concerns.     Hypothyroidism (4/11/2016): TSH low, but t4 normal.  Continue home Synthroid.         PCP: Marvin Simms MD     Consults: Urology    Significant Diagnostic Studies:    ABD US   IMPRESSION  1. Heterogeneous echotexture of the liver with nodular border suggesting  cirrhosis. No focal lesion by ultrasound, correlate clinically. 2. Splenomegaly. 3. Right renal cyst.    US retroperitoneum   IMPRESSION  1. No hydronephrosis.  Right lower pole renal cyst.  2. Prostatomegaly.       Discharge Exam:  Physical Exam:    Gen:  Well-developed, well-nourished, in no acute distress  HEENT:  Pink conjunctivae, PERRL, hearing intact to voice, moist mucous membranes  Neck:  Supple, no tenderness to palpation   Resp:  No accessory muscle use, clear breath sounds without wheezes rales or rhonchi  Card:  RRR, No murmurs, normal S1, S2, no peripheral edema  Abd:  Soft, non-tender, non-distended, normoactive bowel sounds are present  Musc:  No cyanosis or clubbing  Skin:  No rashes or ulcers, skin turgor is good  Neuro:  Cranial nerves 3-12 are grossly intact, follows commands appropriately  Psych:  Oriented to person, place, and time, Alert with good insight    Disposition: home  Discharge Condition: Stable    Patient Instructions:   Current Discharge Medication List      START taking these medications    Details   cefdinir (OMNICEF) 300 mg capsule Take 1 Capsule by mouth two (2) times a day for 7 days. Qty: 14 Capsule, Refills: 0         CONTINUE these medications which have NOT CHANGED    Details   fexofenadine-pseudoephedrine (Allegra-D 24 Hour) 180-240 mg per tablet Take 1 Tablet by mouth daily. guaiFENesin ER (Mucinex) 600 mg ER tablet Take 600 mg by mouth two (2) times a day. finasteride (PROSCAR) 5 mg tablet TAKE 1 TABLET BY MOUTH DAILY  Qty: 90 Tablet, Refills: 1    Associated Diagnoses: Prostatic enlargement      levothyroxine (SYNTHROID) 150 mcg tablet TAKE 1 TABLET BY MOUTH DAILY BEFORE BREAKFAST  Qty: 90 Tablet, Refills: 1      tamsulosin (FLOMAX) 0.4 mg capsule TAKE 1 CAPSULE BY MOUTH DAILY  Qty: 90 Cap, Refills: 1    Associated Diagnoses: Prostatic enlargement      cyanocobalamin (VITAMIN B-12) 1,000 mcg tablet Take 1,000 mcg by mouth daily. clonazePAM (KLONOPIN) 0.5 mg tablet Take 0.5 mg by mouth nightly. multivitamin (ONE A DAY) tablet Take 1 Tab by mouth daily.          STOP taking these medications       aspirin delayed-release 81 mg tablet Comments:   Reason for Stopping:             Activity: Activity as tolerated  Diet: Regular Diet  Wound Care: None needed    Follow-up with  Follow-up Information     Follow up With Specialties Details Why [facility]  On 11/2/2021 Dr. Ernesto Caamcho at 2:10pm [address] [place] [address] Dr Jarad Worrell MD Internal Medicine Schedule an appointment as soon as possible for a visit in 1 week Hospital Follow Up  [address] [facility]  [facility] [address]      [facility] Schedule an appointment as soon as possible for a visit in 1 week Follow up cirrhosis, hepatitis panel [address] [place] [address] [place] [address]  [phone]            Follow-up tests/labs as above.      Signed:  Evelyn Al DO  10/20/2021  11:23 AM  **I personally spent 35 min on discharge**

## 2021-10-20 NOTE — DISCHARGE INSTRUCTIONS
HOSPITALIST DISCHARGE INSTRUCTIONS  NAME: Eron Hess   :  1945   MRN:  [id number]     Date/Time:  10/20/2021 11:14 AM    ADMIT DATE: 10/18/2021     DISCHARGE DATE: 10/20/2021     ADMITTING DIAGNOSIS:  UTI    DISCHARGE DIAGNOSIS:  UTI    MEDICATIONS:    · It is important that you take the medication exactly as they are prescribed. · Keep your medication in the bottles provided by the pharmacist and keep a list of the medication names, dosages, and times to be taken in your wallet. · Do not take other medications without consulting your doctor     Pain Management: per above medications    What to do at Home    Recommended diet:  Regular Diet    Recommended activity: Activity as tolerated    1) Return to the hospital if you feel worse    2) If you experience any of the following symptoms then please call your primary care physician or return to the emergency room if you cannot get hold of your doctor:  Fever, chills, nausea, vomiting, diarrhea, change in mentation, falling, bleeding, shortness of breath, chest pain, severe headache, severe abdominal pain. Follow Up: Follow-up Information     Follow up With Specialties Details Why [address] [facility]  On 2021 Dr. Ceci Jaimes at 2:10pm [address] [place] [address] Dr Shital De La Vega MD Internal Medicine Schedule an appointment as soon as possible for a visit in 1 week Hospital Follow Up  [address] [place] [address]  [facility]  [phone]              Information obtained by :  I understand that if any problems occur once I am at home I am to contact my physician. I understand and acknowledge receipt of the instructions indicated above.                                                                                                                                            Physician's or R.N.'s Signature                                                                  Date/Time Patient or Representative Signature                                                          Date/Time     [facility]/ED Visit Follow-Up Instructions/Information    You may have an in home follow up visit set up with [facility] or may wish to contact [facility] to set-up a visit:    What are we? [facility] is an in-home urgent care service staffed with emergency trained medical teams. We come to your home in a vehicle stocked with medical supplies and technology. An ER physician is always available if needed. When? As a part of your hospital follow-up, an appointment has been/ or can be set up for us to come see you. Usually, this will be 24-72 hours after you leave the hospital or as needed. [facility] is open 7am-9pm, 7 days a week, 365 days a year, including holidays. Why? We know that you cannot always get to your doctor after being in the hospital and that your doctor is not always available when you need them. Once your workup is complete, we'll call in your prescriptions, update your family doctor, and handle billing with your insurance so you can focus on feeling better, faster without leaving home. How much? We accept most major health insurance plans, including Medicaid, Medicare, and Medicare Advantage [address] [place] [address], [facility] and [employer]. We also accept: credit, debit, health savings account (HSA), health reimbursement account (HRA) and flexible spending account (FSA) payments. [facility]'s prices compare to conventional urgent care facilities, but we bring the care to you. How to reach us? Getting care is easy- use our mobile ismael (Tru[employer]), website ([email or web address]) or call us [phone].

## 2021-10-20 NOTE — PROGRESS NOTES
Hospital Follow Up with PCP Bibi Bishop MD for 10/29/2021 at 11:00am.    Scheduled with GI Provider Pedro Crespo NP for 11/17/2021 at 9:30am

## 2021-10-20 NOTE — PROGRESS NOTES
Urology Progress Note    Patient: Margot Lanier MRN: [id number]  SSN: [id number]    YOB: 1945  Age: 64 y.o. Sex: male        Assessment:     Margot Lanier is a 64 y.o. male with history of BPH on flomax and finasteride who presented to the ED with complaints of dysuria, frequency and fevers for about 5 days. He is currently admitted for UTI, microscopic hematuria, GAETANO. He is not an established patient of [facility]. Plan:     1. Urine culture with E. Coli. Continue culture directed abx. 2. Continue finasteride and flomax. 3. Check PVR. Not done yesterday. Discussed with nurse. 4. Outpatient follow up 1-2 weeks. Can consider cystoscopy at that time. Will sign off. Please call if needed. Reason For Visit:     Follow up for UTI, enlarged prostate, hematuria. Interval History:     Still having frequency about every hour. Dysuria improved, afebrile. Creatinine ok. ROS:     Denies fevers  Denies abdominal pain  Denies hematuria, reports frequency    Objective:     Visit Vitals  /78 (BP 1 Location: Right upper arm, BP Patient Position: At rest)   Pulse 73   Temp 99.9 °F (37.7 °C)   Resp 18   Ht 6' 1\" (1.854 m)   Wt 95.3 kg (210 lb)   SpO2 94%   BMI 27.71 kg/m²         Intake/Output Summary (Last 24 hours) at 10/20/2021 1116  Last data filed at 10/19/2021 1955  Gross per 24 hour   Intake 112 ml   Output 0 ml   Net 112 ml       Physical Exam  General: NAD  Respiratory: no distress, room air  : voiding  Neuro: Appropriate, no focal neurological deficits    Labs reviewed, October 20, 2021  Recent Results (from the past 24 hour(s))   HEPATITIS PANEL, ACUTE    Collection Time: 10/19/21  2:26 PM   Result Value Ref Range    Hepatitis A, IgM NONREACTIVE NR      __          Hepatitis B surface Ag 0.31 Index    Hep B surface Ag Interp. Negative NEG      __          Hepatitis B core, IgM NONREACTIVE NR      __          Hep C virus Ab Interp. NONREACTIVE NR     METABOLIC PANEL, COMPREHENSIVE    Collection Time: 10/20/21  4:44 AM   Result Value Ref Range    Sodium 139 136 - 145 mmol/L    Potassium 4.1 3.5 - 5.1 mmol/L    Chloride 110 (H) 97 - 108 mmol/L    CO2 23 21 - 32 mmol/L    Anion gap 6 5 - 15 mmol/L    Glucose 108 (H) 65 - 100 mg/dL    BUN 21 (H) 6 - 20 MG/DL    Creatinine 1.16 0.70 - 1.30 MG/DL    BUN/Creatinine ratio 18 12 - 20      GFR est AA >60 >60 ml/min/1.73m2    GFR est non-AA >60 >60 ml/min/1.73m2    Calcium 8.0 (L) 8.5 - 10.1 MG/DL    Bilirubin, total 2.0 (H) 0.2 - 1.0 MG/DL    ALT (SGPT) 40 12 - 78 U/L    AST (SGOT) 40 (H) 15 - 37 U/L    Alk. phosphatase 139 (H) 45 - 117 U/L    Protein, total 6.3 (L) 6.4 - 8.2 g/dL    Albumin 2.4 (L) 3.5 - 5.0 g/dL    Globulin 3.9 2.0 - 4.0 g/dL    A-G Ratio 0.6 (L) 1.1 - 2.2     CBC WITH AUTOMATED DIFF    Collection Time: 10/20/21  4:44 AM   Result Value Ref Range    WBC 8.2 4.1 - 11.1 K/uL    RBC 3.67 (L) 4.10 - 5.70 M/uL    HGB 11.7 (L) 12.1 - 17.0 g/dL    HCT 34.6 (L) 36.6 - 50.3 %    MCV 94.3 80.0 - 99.0 FL    MCH 31.9 26.0 - 34.0 PG    MCHC 33.8 30.0 - 36.5 g/dL    RDW 14.3 11.5 - 14.5 %    PLATELET 89 (L) 964 - 400 K/uL    MPV 10.2 8.9 - 12.9 FL    NRBC 0.0 0  WBC    ABSOLUTE NRBC 0.00 0.00 - 0.01 K/uL    NEUTROPHILS 72 32 - 75 %    LYMPHOCYTES 12 12 - 49 %    MONOCYTES 13 5 - 13 %    EOSINOPHILS 1 0 - 7 %    BASOPHILS 1 0 - 1 %    IMMATURE GRANULOCYTES 1 (H) 0.0 - 0.5 %    ABS. NEUTROPHILS 5.8 1.8 - 8.0 K/UL    ABS. LYMPHOCYTES 1.0 0.8 - 3.5 K/UL    ABS. MONOCYTES 1.1 (H) 0.0 - 1.0 K/UL    ABS. EOSINOPHILS 0.1 0.0 - 0.4 K/UL    ABS. BASOPHILS 0.1 0.0 - 0.1 K/UL    ABS. IMM.  GRANS. 0.1 (H) 0.00 - 0.04 K/UL    DF SMEAR SCANNED      RBC COMMENTS NORMOCYTIC, NORMOCHROMIC         Imaging:  CT Results  (Last 48 hours)    None          Signed By: Allegra Grove, LINWOOD - October 20, 2021

## 2021-10-20 NOTE — PROGRESS NOTES
Bedside and Verbal shift change report given to Zoya Cardoza (oncoming nurse) by Florin HODGES (offgoing nurse). Report included the following information SBAR, Kardex, Intake/Output, MAR and Recent Results.

## 2021-10-21 ENCOUNTER — PATIENT OUTREACH (OUTPATIENT)
Dept: CASE MANAGEMENT | Age: 76
End: 2021-10-21

## 2021-10-21 NOTE — PROGRESS NOTES
Care Transitions Initial Call    Call within 2 business days of discharge: Yes     Patient: Lou Escobar Patient : 1945 MRN: [id number]    Last Discharge [address]       Complaint Diagnosis Description Type Department Provider    10/18/21 Fever Fever, unspecified fever cause . .. ED to Hosp-Admission (Discharged) (ADMIT) [id number] Lori Fuentes DO; Maggy Sanchez MD          Was this an external facility discharge? no    Challenges to be reviewed by the provider       - AST 40 and  - patient will have f/u labs on   - protein 6.3, albumin 2.4, Ca 8.0  Follow up labs and urine? START taking these medications     Details   cefdinir (OMNICEF) 300 mg capsule Take 1 Capsule by mouth two (2) times a day for 7 days. Qty: 14 Capsule, Refills: 0         STOP taking these medications         aspirin delayed-release 81 mg tablet Comments:   Reason for Stopping:                 Method of communication with provider : chart routing    Discussed COVID-19 related testing which was not done at this time. Advance Care Planning:   Does patient have an Advance Directive: not on file     Inpatient Readmission Risk score: Unplanned Readmit Risk Score: 6    Was this a readmission? no     Patients top risk factors for readmission: lack of knowledge about disease and multiple health system providers   Interventions to address risk factors: Obtained and reviewed discharge summary and/or continuity of care documents    Care Transition Nurse (CTN) contacted the patient by telephone to perform post hospital discharge assessment. Verified name and  with patient as identifiers. Provided introduction to self, and explanation of the CTN role. CTN reviewed discharge instructions, medical action plan and red flags with patient who verbalized understanding. Were discharge instructions available to patient? yes.  Reviewed appropriate site of care based on symptoms and resources available to patient including: Urgent Care Clinics and When to call 911. Patient given an opportunity to ask questions and does not have any further questions or concerns at this time. The patient agrees to contact the PCP office for questions related to their healthcare. Medication reconciliation was performed with patient, who verbalizes understanding of administration of home medications. Advised obtaining a 90-day supply of all daily and as-needed medications. Referral to Pharm D needed: no     Home Health/Outpatient orders at discharge: none    Durable Medical Equipment ordered at discharge: none    Covid Risk Education    Educated patient about risk for severe COVID-19 due to risk factors according to CDC guidelines. CTN reviewed discharge instructions, medical action plan and red flag symptoms with the patient who verbalized understanding. Discussed COVID vaccination status: yes. Education provided on COVID-19 vaccination as appropriate. Discussed exposure protocols and quarantine with CDC Guidelines. Patient was given an opportunity to verbalize any questions and concerns and agrees to contact CTN or health care provider for questions related to their healthcare. Was patient discharged with a pulse oximeter? no      Discussed follow-up appointments. If no appointment was previously scheduled, appointment scheduling offered: yes. Is follow up appointment scheduled within 7 days of discharge? No - first available 10/29  [address] follow up appointment(s):   Future Appointments   Date Time Provider Tammy Johnston   10/29/2021 11:00 AM Alley Redding, Michelle Us MD [facility]     Non-[facility] follow up appointment(s): urology 11/2, GI doctor 10/29    Plan for follow-up call in 7-10 days based on severity of symptoms and risk factors. Plan for next call: symptom management-assess s/s  and follow up appointment-confirm attendance  CTN provided contact information for future needs.     Goals Addressed                 This Visit's Progress     Prevent complications post hospitalization. 10/21/2021    - Spoke to patient today. Patient reports he is feeling \"good\". He stated his urinary frequency is improved and he went less last night then prior nights in the hospital.   - Reviewed urinary hygiene such as washing hands and avoiding caffeine and carbonated beverages, emptying the bladder completely and monitoring for red flag s/s such as pain when urinating, foul odor, change in color or clarity of urine. Patient reports that his urine is clear, no odor. Also discussed how UTI can cause AMS. Patient reports that he did not see any visible blood in his urine.   - Reviewed red flag s/s with patient, nausea, vomiting, inability to pass urine/stool, SOB, mental status change, chest pain, fever.    - patient will see urology on 11/2, PCP on 10/29 (first available), and GI on 11/17.   - patient will complete course of antibiotics and take other meds as prescribed. Discussed probiotic or yogurt and monitoring for diarrhea   - patient declineds need for [facility], he is taking multiple short walks daily   - patient did stop ASA, he will speak to PCP and urology about restarting the ASA     Patient will contact Md/CTN if red flag s/s arise. CTN to f/u ~ 7-10 days. AR                   START taking these medications     Details   cefdinir (OMNICEF) 300 mg capsule Take 1 Capsule by mouth two (2) times a day for 7 days.   Qty: 14 Capsule, Refills: 0         STOP taking these medications         aspirin delayed-release 81 mg tablet Comments:   Reason for Stopping:

## 2021-10-26 DIAGNOSIS — N40.0 PROSTATIC ENLARGEMENT: ICD-10-CM

## 2021-10-26 RX ORDER — TAMSULOSIN HYDROCHLORIDE 0.4 MG/1
CAPSULE ORAL
Qty: 90 CAPSULE | Refills: 1 | Status: SHIPPED | OUTPATIENT
Start: 2021-10-26 | End: 2022-04-28

## 2021-10-29 ENCOUNTER — OFFICE VISIT (OUTPATIENT)
Dept: INTERNAL MEDICINE CLINIC | Age: 76
End: 2021-10-29
Payer: MEDICARE

## 2021-10-29 VITALS
RESPIRATION RATE: 13 BRPM | DIASTOLIC BLOOD PRESSURE: 65 MMHG | WEIGHT: 201.4 LBS | BODY MASS INDEX: 26.69 KG/M2 | OXYGEN SATURATION: 95 % | TEMPERATURE: 98.3 F | HEART RATE: 73 BPM | HEIGHT: 73 IN | SYSTOLIC BLOOD PRESSURE: 106 MMHG

## 2021-10-29 DIAGNOSIS — D69.6 THROMBOCYTOPENIA (HCC): ICD-10-CM

## 2021-10-29 DIAGNOSIS — E61.1 IRON DEFICIENCY: ICD-10-CM

## 2021-10-29 DIAGNOSIS — N39.0 URINARY TRACT INFECTION WITH HEMATURIA, SITE UNSPECIFIED: Primary | ICD-10-CM

## 2021-10-29 DIAGNOSIS — N40.0 PROSTATIC ENLARGEMENT: ICD-10-CM

## 2021-10-29 DIAGNOSIS — Q54.1 PENILE HYPOSPADIAS: ICD-10-CM

## 2021-10-29 DIAGNOSIS — R31.9 URINARY TRACT INFECTION WITH HEMATURIA, SITE UNSPECIFIED: Primary | ICD-10-CM

## 2021-10-29 DIAGNOSIS — E03.9 ACQUIRED HYPOTHYROIDISM: ICD-10-CM

## 2021-10-29 DIAGNOSIS — D61.818 PANCYTOPENIA (HCC): ICD-10-CM

## 2021-10-29 DIAGNOSIS — R93.2 ABNORMAL LIVER DIAGNOSTIC IMAGING: ICD-10-CM

## 2021-10-29 DIAGNOSIS — R74.8 LIVER ENZYME ELEVATION: ICD-10-CM

## 2021-10-29 PROBLEM — I73.9 PERIPHERAL VASCULAR DISEASE (HCC): Status: RESOLVED | Noted: 2019-12-16 | Resolved: 2021-10-29

## 2021-10-29 PROCEDURE — 99215 OFFICE O/P EST HI 40 MIN: CPT | Performed by: INTERNAL MEDICINE

## 2021-10-29 PROCEDURE — G8427 DOCREV CUR MEDS BY ELIG CLIN: HCPCS | Performed by: INTERNAL MEDICINE

## 2021-10-29 NOTE — PROGRESS NOTES
HISTORY OF PRESENT ILLNESS    Chief Complaint   Patient presents with   [facility][1] Follow Up     Hematuria - [facility]       Presents for Transitional care management (TCM) visit s/p of hospital admission from 10/18 until 10/20/2021 at [facility].    Nurse Navigator call noted to patient and documented in [address] on 10/21/21. Hospital record and relevant lab results, test results and consult notes have personally been reviewed by me at this office visit. Medications reviewed and reconciled. Patient was hospitalized for hematuria and acute kidney injury secondary to UTI.   Hematuria (10/18/2021): Suspect may be due to UTI.  Hemoglobin stable. Treat UTI as below.  Urology evaluated; no acute concerns, f/u with urology OP.      Acute kidney injury: Suspect due to infection and volume depletion. Resolved. Ultrasound w/o any acute needs. S/p IVF.    UTI /  Hx of Prostatic enlargement (12/6/2017)/ Hypotension : Not septic. urine culture with gram neg rods; Ecoli pan sensitive.  S/p IV Rocephin; continue one more week of omnicef on DC. Continue home Flomax and finasteride. F/u urology OP. Thrombocytopenia: chronic, per patient he follows with his PCP for this. ABD US with concern for cirrhosis. Patient reports prior Hep infection suspect hep B based on Hep panel. F/u with GI OP. Renal cyst: f/u urology OP.    Obstructive sleep apnea (4/11/2016): No acute concerns. Hypothyroidism (4/11/2016): TSH low, but t4 normal.  Continue home Synthroid.   ABD US   IMPRESSION  1. Heterogeneous echotexture of the liver with nodular border suggesting  cirrhosis. No focal lesion by ultrasound, correlate clinically. 2. Splenomegaly. 3. Right renal cyst.  US retroperitoneum   IMPRESSION  1.  No hydronephrosis. Right lower pole renal cyst.  2.  Prostatomegaly.     Today, patient presents with his wife. Says he is doing much better. Still feels a little bit tired.   Denies any urinary frequency, dysuria, fevers, chills, flank pains. Hypothyroidism follow-up  Reports no fatigue  denies heat/cold intolerance, bowel/skin changes or CVS symptoms, losing hair, feeling excessive energy, tremulousness, palpitations. Thyroid medication has been unchanged since last medication check and labs. Lab Results   Component Value Date/Time    TSH 0.19 (L) 10/18/2021 09:35 AM    T4, Free 1.3 10/19/2021 05:29 AM     Wt Readings from Last 3 Encounters:   10/29/21 201 lb 6.4 oz (91.4 kg)   10/18/21 210 lb (95.3 kg)   04/23/21 205 lb (93 kg)       Review of Systems   All other systems reviewed and are negative, except as noted in HPI    Past Medical and Surgical History   has a past medical history of Abdominal lymphadenopathy (2002), B12 deficiency, Cataract, Chronic anxiety, Coronary artery calcification seen on CT scan (04/11/2016), Degenerative joint disease (DJD) of lumbar spine, Environmental allergies, Essential tremor, FH: colon cancer, Hepatitis A, Hypospadias, Hypothyroidism (04/11/2016), IFG (impaired fasting glucose) (04/06/2016), Lymphadenopathy (2002), Mononucleosis, Normal cardiac stress test (04/2016), Obstructive sleep apnea, Plantar fasciitis, Prostatic enlargement (12/6/2017), PVC (premature ventricular contraction), REM sleep behavior disorder, Screening for skin cancer, Septic shock ([facility] [address] (04/04/2002), Sleep apnea, Spinal stenosis of lumbar region at multiple levels (12/6/2017), Thrombocytopenia ([facility] [address] and Trigger finger. has a past surgical history that includes hx tonsillectomy; hx vasectomy; hx endoscopy (01/22/2002); hx lymphadenectomy (02/14/2002); hx knee arthroscopy (Left); hx colonoscopy (08/13/2013); hx orthopaedic (Right); hx colonoscopy (11/27/2018); and hx cataract removal (Bilateral, 11/2019). reports that he has never smoked. He has never used smokeless tobacco. He reports that he does not drink alcohol and does not use drugs. family history includes Cancer in his father and mother.     Physical Exam Nursing note and vitals reviewed. Blood pressure 106/65, pulse 73, temperature 98.3 °F (36.8 °C), temperature source Oral, resp. rate 13, height 6' 1\" (1.854 m), weight 201 lb 6.4 oz (91.4 kg), SpO2 95 %. Constitutional:  No distress. Eyes: Conjunctivae are normal.   Ears:  Hearing grossly intact  Cardiovascular: Normal rate. regular rhythm, no murmurs or gallops  No edema  Pulmonary/Chest: Effort normal.   CTAB  Musculoskeletal: moves all 4 extremities   Neurological: Alert and oriented to person, place, and time. Skin: No rash noted. Psychiatric: Normal mood and affect. Behavior is normal.     ASSESSMENT and PLAN  Diagnoses and all orders for this visit:    1. Urinary tract infection with hematuria, site unspecified  2. Prostatic enlargement  3. Penile hypospadias  Asymptomatic. Resolved. E. coli. Unclear etiology, but hypospadias and prostate symptoms could be contributing. Will repeat urinalysis next week with urology. Consider intervention, but he has not had a UTI in the past I think observation is also very reasonable for now. Seeing Urology Dr Hernesto Valadez 11/2/21. 4. Pancytopenia ([address]  5. Iron deficiency  6. Thrombocytopenia (HCC)  Chronic mild pancytopenia with iron deficiency and thrombocytopenia. Evaluating for cirrhosis as below. May consider marrow biopsy? His white cell count was actually normal in the hospital, but likely falsely elevated because of infection. Seeing Hematology Dr. Kaylee Tam 11/30/21  Lab Results   Component Value Date/Time    WBC 8.2 10/20/2021 04:44 AM    HGB 11.7 (L) 10/20/2021 04:44 AM    HCT 34.6 (L) 10/20/2021 04:44 AM    PLATELET 89 (L) 40/10/3626 04:44 AM    MCV 94.3 10/20/2021 04:44 AM       7. Abnormal liver diagnostic imaging  8. Liver enzyme elevation  Unclear etiology if he does have some degree of cirrhosis. Has never been a heavy drinker. Fatty liver disease possible. He also does have some degree of splenomegaly.   Seeing GI NP of Dr. Cony Campbell on 11/19/21. Pt sees Dr. Sherie San as well    9. Acquired hypothyroidism  TSH was mildly low in the hospital, but he was ill. T4 was 1.3, in normal limits. Repeat thyroid panel within 3 months. lab results and schedule of future lab studies reviewed with patient  reviewed medications and side effects in detail    Return to clinic for further evaluation if new symptoms develop  Return to clinic for wellness exam as scheduled January 26      Current Outpatient Medications   Medication Sig    tamsulosin (FLOMAX) 0.4 mg capsule TAKE 1 CAPSULE BY MOUTH DAILY    fexofenadine-pseudoephedrine (Allegra-D 24 Hour) 180-240 mg per tablet Take 1 Tablet by mouth daily.  guaiFENesin ER (Mucinex) 600 mg ER tablet Take 600 mg by mouth two (2) times a day.  finasteride (PROSCAR) 5 mg tablet TAKE 1 TABLET BY MOUTH DAILY    levothyroxine (SYNTHROID) 150 mcg tablet TAKE 1 TABLET BY MOUTH DAILY BEFORE BREAKFAST    cyanocobalamin (VITAMIN B-12) 1,000 mcg tablet Take 1,000 mcg by mouth daily.  clonazePAM (KLONOPIN) 0.5 mg tablet Take 0.5 mg by mouth nightly.  multivitamin (ONE A DAY) tablet Take 1 Tab by mouth daily. No current facility-administered medications for this visit.

## 2021-11-10 ENCOUNTER — PATIENT OUTREACH (OUTPATIENT)
Dept: CASE MANAGEMENT | Age: 76
End: 2021-11-10

## 2021-11-11 NOTE — PROGRESS NOTES
Goals      Prevent complications post hospitalization. 10/21/2021    - Spoke to patient today. Patient reports he is feeling \"good\". He stated his urinary frequency is improved and he went less last night then prior nights in the hospital.   - Reviewed urinary hygiene such as washing hands and avoiding caffeine and carbonated beverages, emptying the bladder completely and monitoring for red flag s/s such as pain when urinating, foul odor, change in color or clarity of urine. Patient reports that his urine is clear, no odor. Also discussed how UTI can cause AMS. Patient reports that he did not see any visible blood in his urine.   - Reviewed red flag s/s with patient, nausea, vomiting, inability to pass urine/stool, SOB, mental status change, chest pain, fever.    - patient will see urology on 11/2, PCP on 10/29 (first available), and GI on 11/17.   - patient will complete course of antibiotics and take other meds as prescribed. Discussed probiotic or yogurt and monitoring for diarrhea   - patient declineds need for [place], he is taking multiple short walks daily   - patient did stop ASA, he will speak to PCP and urology about restarting the ASA     Patient will contact Md/CTN if red flag s/s arise. CTN to f/u ~ 7-10 days. AR       11/11/21  Spoke to patient today. Patient saw PCP on 10/29, and patient saw urology on 11/2. Patient has procedure coming up with urology, they are going to [facility] in and look at bladder\". He will stay off ASA until after the procedure. Dr. Cari Sylvester put him on 2 more weeks of antibiotics to make sure infection was cleared before procedure. Patient reported no questions or concerns. He declined a need for any further MELISSA call, Ctn to keep episode open til end of MELISSA period as patient does have CTN contact info if patient needed to contact CTN in meantime.  AR

## 2021-12-01 ENCOUNTER — PATIENT OUTREACH (OUTPATIENT)
Dept: CASE MANAGEMENT | Age: 76
End: 2021-12-01

## 2021-12-03 NOTE — PROGRESS NOTES
Patient has graduated from the Transitions of Care Coordination  program on 12/2/2021. Patient was not referred to the [facility] team for further management. Goals Addressed                 This Visit's Progress     COMPLETED: Prevent complications post hospitalization. 10/21/2021    - Spoke to patient today. Patient reports he is feeling \"good\". He stated his urinary frequency is improved and he went less last night then prior nights in the hospital.   - Reviewed urinary hygiene such as washing hands and avoiding caffeine and carbonated beverages, emptying the bladder completely and monitoring for red flag s/s such as pain when urinating, foul odor, change in color or clarity of urine. Patient reports that his urine is clear, no odor. Also discussed how UTI can cause AMS. Patient reports that he did not see any visible blood in his urine.   - Reviewed red flag s/s with patient, nausea, vomiting, inability to pass urine/stool, SOB, mental status change, chest pain, fever.    - patient will see urology on 11/2, PCP on 10/29 (first available), and GI on 11/17.   - patient will complete course of antibiotics and take other meds as prescribed. Discussed probiotic or yogurt and monitoring for diarrhea   - patient declineds need for [place], he is taking multiple short walks daily   - patient did stop ASA, he will speak to PCP and urology about restarting the ASA     Patient will contact Md/CTN if red flag s/s arise. CTN to f/u ~ 7-10 days. AR       11/11/21  Spoke to patient today. Patient saw PCP on 10/29, and patient saw urology on 11/2. Patient has procedure coming up with urology, they are going to [facility] in and look at bladder\". He will stay off ASA until after the procedure. Dr. Ti Valladares put him on 2 more weeks of antibiotics to make sure infection was cleared before procedure. Patient reported no questions or concerns.  He declined a need for any further MELISSA call, Ctn to keep episode open til end of MELISSA period as patient does have CTN contact info if patient needed to contact CTN in meantime. AR              Patient has Care Transition Nurse's contact information for any further questions, concerns, or needs.   Patients upcoming visits:    Future Appointments   Date Time Provider Tammy Johnston   1/26/2022  1:00 PM Steffen Munoz MD [facility] BS AMB

## 2022-01-10 RX ORDER — LEVOTHYROXINE SODIUM 150 UG/1
TABLET ORAL
Qty: 90 TABLET | Refills: 1 | Status: SHIPPED | OUTPATIENT
Start: 2022-01-10 | End: 2022-07-27

## 2022-01-15 NOTE — PERIOP NOTES
Called mobile, Informed patient of COVID requirements, patient to complete COVID curbside testing at [facility] Monday, January 17th or Tuesday, January 18th between [id number]. Patient verbalized understanding that COVID test is required to proceed with procedure.

## 2022-01-17 ENCOUNTER — TRANSCRIBE ORDER (OUTPATIENT)
Dept: REGISTRATION | Age: 77
End: 2022-01-17

## 2022-01-17 ENCOUNTER — HOSPITAL ENCOUNTER (OUTPATIENT)
Dept: LAB | Age: 77
Discharge: HOME | End: 2022-01-17
Payer: MEDICARE

## 2022-01-17 DIAGNOSIS — Z01.812 PRE-PROCEDURAL LABORATORY EXAMINATIONS: Primary | ICD-10-CM

## 2022-01-17 DIAGNOSIS — Z01.812 PRE-PROCEDURAL LABORATORY EXAMINATIONS: ICD-10-CM

## 2022-01-17 PROCEDURE — U0005 INFEC AGEN DETEC AMPLI PROBE: HCPCS

## 2022-01-18 LAB
SARS-COV-2, XPLCVT: NOT DETECTED
SOURCE, COVRS: NORMAL

## 2022-01-19 NOTE — PERIOP NOTES
[address]  Endoscopy Preprocedure Instructions      1. On the day of your surgery, please report to registration located on the 2nd floor of the  [facility]. yes    2. You must have a responsible adult to drive you to the hospital, stay at the hospital during your procedure and drive you home. If they leave your procedure will not be started (no exceptions). yes    3. Do not have anything to eat or drink (including water, gum, mints, coffee, and juice) after midnight. This does not apply to the medications you were instructed to take by your physician. yes  If you are currently taking Plavix, Coumadin, Aspirin, or other blood-thinning agents, contact your physician for special instructions. not applicable,    4. If you are having a procedure that requires bowel prep: We recommend that you have only clear liquids the day before your procedure and begin your bowel prep by 5:00 pm.  You may continue to drink clear liquids until midnight. If for any reason you are not able to complete your prep please notify your physician immediately. yes    5. Have a list of all current medications, including vitamins, herbal supplements and any other over the counter medications. yes    6. If you wear glasses, contacts, dentures and/or hearing aids, they may be removed prior to procedure, please bring a case to store them in. yes    7. You should understand that if you do not follow these instructions your procedure may be cancelled. If your physical condition changes (I.e. fever, cold or flu) please contact your doctor as soon as possible. 8. It is important that you be on time. If for any reason you are unable to keep your appointment please call [phone] the day of or your physicians office prior to your scheduled procedure    9.  Have you received your COVID Vaccine? yes If no, you will need to receive a COVID test/swab here at [address] [place] [address] [place] [facility] [place] Monday - Friday 8a - 11am. There are no Saturday or Sunday swabbing at any [facility]. (patient verbalizes understanding) yes

## 2022-01-20 ENCOUNTER — HOSPITAL ENCOUNTER (OUTPATIENT)
Age: 77
Setting detail: OUTPATIENT SURGERY
Discharge: HOME | End: 2022-01-20
Attending: INTERNAL MEDICINE | Admitting: INTERNAL MEDICINE
Payer: MEDICARE

## 2022-01-20 ENCOUNTER — ANESTHESIA (OUTPATIENT)
Dept: ENDOSCOPY | Age: 77
End: 2022-01-20
Payer: MEDICARE

## 2022-01-20 ENCOUNTER — ANESTHESIA EVENT (OUTPATIENT)
Dept: ENDOSCOPY | Age: 77
End: 2022-01-20
Payer: MEDICARE

## 2022-01-20 VITALS
WEIGHT: 193.12 LBS | SYSTOLIC BLOOD PRESSURE: 137 MMHG | RESPIRATION RATE: 10 BRPM | HEIGHT: 73 IN | OXYGEN SATURATION: 99 % | DIASTOLIC BLOOD PRESSURE: 81 MMHG | BODY MASS INDEX: 25.6 KG/M2 | TEMPERATURE: 97.8 F | HEART RATE: 65 BPM

## 2022-01-20 PROCEDURE — 76040000019: Performed by: INTERNAL MEDICINE

## 2022-01-20 PROCEDURE — 74011250636 HC RX REV CODE- 250/636: Performed by: NURSE ANESTHETIST, CERTIFIED REGISTERED

## 2022-01-20 PROCEDURE — 76060000031 HC ANESTHESIA FIRST 0.5 HR: Performed by: INTERNAL MEDICINE

## 2022-01-20 PROCEDURE — 74011000250 HC RX REV CODE- 250: Performed by: NURSE ANESTHETIST, CERTIFIED REGISTERED

## 2022-01-20 PROCEDURE — 2709999900 HC NON-CHARGEABLE SUPPLY: Performed by: INTERNAL MEDICINE

## 2022-01-20 PROCEDURE — 88305 TISSUE EXAM BY PATHOLOGIST: CPT

## 2022-01-20 PROCEDURE — 77030021593 HC FCPS BIOP ENDOSC BSC -A: Performed by: INTERNAL MEDICINE

## 2022-01-20 RX ORDER — PROPOFOL 10 MG/ML
INJECTION, EMULSION INTRAVENOUS
Status: DISCONTINUED | OUTPATIENT
Start: 2022-01-20 | End: 2022-01-20 | Stop reason: HOSPADM

## 2022-01-20 RX ORDER — DEXTROMETHORPHAN/PSEUDOEPHED 2.5-7.5/.8
1.2 DROPS ORAL
Status: DISCONTINUED | OUTPATIENT
Start: 2022-01-20 | End: 2022-01-20 | Stop reason: HOSPADM

## 2022-01-20 RX ORDER — NALOXONE HYDROCHLORIDE 0.4 MG/ML
0.4 INJECTION, SOLUTION INTRAMUSCULAR; INTRAVENOUS; SUBCUTANEOUS
Status: DISCONTINUED | OUTPATIENT
Start: 2022-01-20 | End: 2022-01-20 | Stop reason: HOSPADM

## 2022-01-20 RX ORDER — SODIUM CHLORIDE 9 MG/ML
50 INJECTION, SOLUTION INTRAVENOUS CONTINUOUS
Status: DISCONTINUED | OUTPATIENT
Start: 2022-01-20 | End: 2022-01-20 | Stop reason: HOSPADM

## 2022-01-20 RX ORDER — FLUMAZENIL 0.1 MG/ML
0.2 INJECTION INTRAVENOUS
Status: DISCONTINUED | OUTPATIENT
Start: 2022-01-20 | End: 2022-01-20 | Stop reason: HOSPADM

## 2022-01-20 RX ORDER — MIDAZOLAM HYDROCHLORIDE 1 MG/ML
.25-5 INJECTION, SOLUTION INTRAMUSCULAR; INTRAVENOUS
Status: DISCONTINUED | OUTPATIENT
Start: 2022-01-20 | End: 2022-01-20 | Stop reason: HOSPADM

## 2022-01-20 RX ORDER — ATROPINE SULFATE 0.1 MG/ML
0.4 INJECTION INTRAVENOUS
Status: DISCONTINUED | OUTPATIENT
Start: 2022-01-20 | End: 2022-01-20 | Stop reason: HOSPADM

## 2022-01-20 RX ORDER — EPINEPHRINE 0.1 MG/ML
1 INJECTION INTRACARDIAC; INTRAVENOUS
Status: DISCONTINUED | OUTPATIENT
Start: 2022-01-20 | End: 2022-01-20 | Stop reason: HOSPADM

## 2022-01-20 RX ORDER — LIDOCAINE HYDROCHLORIDE 20 MG/ML
INJECTION, SOLUTION EPIDURAL; INFILTRATION; INTRACAUDAL; PERINEURAL AS NEEDED
Status: DISCONTINUED | OUTPATIENT
Start: 2022-01-20 | End: 2022-01-20 | Stop reason: HOSPADM

## 2022-01-20 RX ORDER — PROPOFOL 10 MG/ML
INJECTION, EMULSION INTRAVENOUS AS NEEDED
Status: DISCONTINUED | OUTPATIENT
Start: 2022-01-20 | End: 2022-01-20 | Stop reason: HOSPADM

## 2022-01-20 RX ORDER — SODIUM CHLORIDE 9 MG/ML
INJECTION, SOLUTION INTRAVENOUS
Status: DISCONTINUED | OUTPATIENT
Start: 2022-01-20 | End: 2022-01-20 | Stop reason: HOSPADM

## 2022-01-20 RX ADMIN — SODIUM CHLORIDE: 9 INJECTION, SOLUTION INTRAVENOUS at 10:56

## 2022-01-20 RX ADMIN — PROPOFOL 20 MG: 10 INJECTION, EMULSION INTRAVENOUS at 11:19

## 2022-01-20 RX ADMIN — PROPOFOL 20 MG: 10 INJECTION, EMULSION INTRAVENOUS at 11:21

## 2022-01-20 RX ADMIN — PROPOFOL 20 MG: 10 INJECTION, EMULSION INTRAVENOUS at 11:24

## 2022-01-20 RX ADMIN — PROPOFOL INJECTABLE EMULSION 100 MCG/KG/MIN: 10 INJECTION, EMULSION INTRAVENOUS at 11:18

## 2022-01-20 RX ADMIN — LIDOCAINE HYDROCHLORIDE 80 MG: 20 INJECTION, SOLUTION INTRAVENOUS at 11:18

## 2022-01-20 RX ADMIN — PROPOFOL 80 MG: 10 INJECTION, EMULSION INTRAVENOUS at 11:18

## 2022-01-20 NOTE — PROCEDURES
Harpal Piña M.D.  [phone]            2022          Colonoscopy Operative Report  Oneida Justice  :  1945  [place] Medical Record Number:  [id number]      Indications:  GARY     :  Meg Mckenzie MD    Assistant -- None  Implants -- None    Referring Provider: Raleigh Najjar, MD    Sedation:  MAC anesthesia Propofol    Pre-Procedural Exam:      Airway: clear,  No airway problems anticipated  Heart: RRR, without gallops or rubs  Lungs: clear bilaterally without wheezes, crackles, or rhonchi  Abdomen: soft, nontender, nondistended, bowel sounds present  Mental Status: awake, alert and oriented to person, place and time     Procedure Details:  After informed consent was obtained with all risks and benefits of procedure explained and preoperative exam completed, the patient was taken to the endoscopy suite and placed in the left lateral decubitus position. Upon sequential sedation as per above, a digital rectal exam was performed. The Olympus videocolonoscope  was inserted in the rectum and carefully advanced to the terminal ileum. The quality of preparation was good. The colonoscope was slowly withdrawn with careful inspection and evaluation between folds. Retroflexion in the rectum was performed. Findings:   Terminal Ileum: normal  Cecum: normal  Ascending Colon: normal  Transverse Colon: normal  Descending Colon: normal  Sigmoid: normal  Rectum: normal    Interventions:  none    Specimen Removed:  none    Complications: None. EBL:  None. Impression: Normal colonoscopy    Recommendations:  No further colonoscopy indicated due to age  Follow-up in the office as scheduled    Discharge Disposition:  Home in the company of a  when able to ambulate.     Meg Mckenzie MD  2022  11:42 AM

## 2022-01-20 NOTE — H&P
Lewis Walsh M.D.  [phone]            History and Physical       NAME:  Krzysztof Chavez   :   1945   MRN:   [id number]       Referring Physician:  Vianca Pham MD      Consult Date: 2022 9:23 AM    Chief Complaint:  GARY    History of Present Illness:  Mr. Daysi Youssef is a 64year old male who presents with an abnormal x-ray finding. On  he presented to [address] [place] [address] ER for pyuria and fevers. He had an abdominal US which showed an incidental finding of heterogenous echotexture of the liver with nodular border suggesting cirrhosis. No focal lesion by ultrasound. Lab work significant for slightly elevated AST and AP, normal ALT, negative hepatitis panel, and GARY with a Hgb of 11.3.  He denies N/V, dysphagia, abdominal pain. He denies change in bowel habits, constipation, diarrhea, blood/black/tarry stools. He endorses a 10lb weight loss since his UTI and he says that his appetite has decreased since starting antibiotics. He has gained a few more pounds back. He denies nicotine, drug use, alcohol use  His sister and mother have colon cancer  He normally goes to see Dr. Alondra Bowen at [facility]. He has been having colonoscopies every 5 years. Last colon was in 2018        PMH:  Past Medical History:   Diagnosis Date    Abdominal lymphadenopathy     Dr. Daniel.  lymph node enlargement near pancreas. EUS biopsy 02 benign, but \"possible pneumonia\"    B12 deficiency     Cataract     Dr. Mary Kay Valentine Chronic anxiety     Coronary artery calcification seen on CT scan 2016    patient with LOW cholesterol. A.  EBT (10/4/10):  LM 8, , LCx 0, . Total = 331.  Degenerative joint disease (DJD) of lumbar spine     MRI 3/2017 shows moderate disease and mild stenosis    Environmental allergies     Dr. Oscar Deleon.  also med allergies ibuprofen    Essential tremor     FH: colon cancer     mother    Hepatitis A     as child    Hypospadias     Hypothyroidism 04/11/2016    saw Dr. Peggy Ng int he past    IFG (impaired fasting glucose) 04/06/2016    a1c 6.7%    Lymphadenopathy 2002    left axillary biopsy 2/14/02 - benign. Dr. Daniel, Dr. Melba Adams Normal cardiac stress test 04/2016    Dr. Flaca Broussard Obstructive sleep apnea     on CPAP. Dr. Joya Bonds [facility])     Dr Rafia Camargo. s/p marrow biopsy 11/2020    Plantar fasciitis     Dr. Patricia Mcdaniels.  hx R cortisone injection    Prostatic enlargement 12/6/2017    PVC (premature ventricular contraction)     REM sleep behavior disorder     Screening for skin cancer     sees derm Dr. Coleen Lockwood Septic shock ([facility] [address] 04/04/2002    JW    Sleep apnea     Spinal stenosis of lumbar region at multiple levels 12/6/2017    Thrombocytopenia (HCC)     Trigger finger     Dr. Chidi Blanco. s/p R surgery       PSH:  Past Surgical History:   Procedure Laterality Date    HX CATARACT REMOVAL Bilateral 11/2019    Dr. Ruchi Lundberg HX COLONOSCOPY  08/13/2013    4/15/2002, 11/11/2009, 8/13/2013- Dr Griselda Rivet    HX COLONOSCOPY  11/27/2018    tubular adenoma    HX ENDOSCOPY  01/22/2002    Dr Lsia Vo ARTHROSCOPY Left     HX LYMPHADENECTOMY  02/14/2002    mediastinoscopy, under left arm for biopsy    HX ORTHOPAEDIC Right     trigger finger    HX TONSILLECTOMY      HX VASECTOMY         Allergies: Allergies   Allergen Reactions    Advil [Ibuprofen] Hives    Amitex Pse [Pseudoephedrine-Guaifenesin] Other (comments)     Dry mouth    Hydrocodone-Acetaminophen Rash, Itching and Swelling    Levaquin [Levofloxacin] Rash     Body aches       Home Medications:  Prior to Admission Medications   Prescriptions Last Dose Informant Patient Reported? Taking? clonazePAM (KLONOPIN) 0.5 mg tablet  Other Yes No   Sig: Take 0.5 mg by mouth nightly. cyanocobalamin (VITAMIN B-12) 1,000 mcg tablet  Other Yes No   Sig: Take 1,000 mcg by mouth daily.    fexofenadine-pseudoephedrine (Allegra-D 24 Hour) 180-240 mg per tablet   Yes No   Sig: Take 1 Tablet by mouth daily. finasteride (PROSCAR) 5 mg tablet  Other No No   Sig: TAKE 1 TABLET BY MOUTH DAILY   guaiFENesin ER (Mucinex) 600 mg ER tablet   Yes No   Sig: Take 600 mg by mouth two (2) times a day. levothyroxine (SYNTHROID) 150 mcg tablet   No No   Sig: TAKE 1 TABLET BY MOUTH DAILY BEFORE BREAKFAST   multivitamin (ONE A DAY) tablet  Other Yes No   Sig: Take 1 Tab by mouth daily. tamsulosin (FLOMAX) 0.4 mg capsule   No No   Sig: TAKE 1 CAPSULE BY MOUTH DAILY      Facility-Administered Medications: None       Hospital Medications:  No current facility-administered medications for this encounter. Social History:  Social History     Tobacco Use    Smoking status: Never Smoker    Smokeless tobacco: Never Used   Substance Use Topics    Alcohol use: No     Alcohol/week: 0.0 standard drinks       Family History:  Family History   Problem Relation Age of Onset    Cancer Mother         colon    Cancer Father         lung             Review of Systems:      Constitutional: negative fever, negative chills, negative weight loss  Eyes:   negative visual changes  ENT:   negative sore throat, tongue or lip swelling  Respiratory:  negative cough, negative dyspnea  Cards:  negative for chest pain, palpitations, lower extremity edema  GI:   See HPI  :  negative for frequency, dysuria  Integument:  negative for rash and pruritus  Heme:  negative for easy bruising and gum/nose bleeding  Musculoskel: negative for myalgias,  back pain and muscle weakness  Neuro: negative for headaches, dizziness, vertigo  Psych:  negative for feelings of anxiety, depression       Objective:   No data found. No intake/output data recorded. No intake/output data recorded.     EXAM:     NEURO-a&o   HEENT-wnl   LUNGS-clear    COR-regular rate and rhythym     ABD-soft , no tenderness, no rebound, good bs     EXT-no edema     Data Review     No results for input(s): WBC, HGB, HCT, PLT, HGBEXT, HCTEXT, PLTEXT in the last 72 hours. No results for input(s): NA, K, CL, CO2, BUN, CREA, GLU, PHOS, CA in the last 72 hours. No results for input(s): AP, TBIL, TP, ALB, GLOB, GGT, AML, LPSE in the last 72 hours. No lab exists for component: SGOT, GPT, AMYP, HLPSE  No results for input(s): INR, PTP, APTT, INREXT in the last 72 hours. Patient Active Problem List   Diagnosis Code    Obstructive sleep apnea G47.33    Hypothyroidism E03.9    Normal cardiac stress test [id number]    Chronic anxiety F41.9    Environmental allergies Z91.09    Hepatitis A B15.9    Lymphadenopathy R59.1    Plantar fasciitis M72.2    Screening for skin cancer Z12.83    Sleep apnea G47.30    Trigger finger M65.30    IFG (impaired fasting glucose) R73.01    Essential tremor G25.0    Degenerative joint disease (DJD) of lumbar spine M47.816    Advanced care planning/counseling discussion Z71.89    Spinal stenosis of lumbar region at multiple levels M48.061    Prostatic enlargement N40.0    Hypospadias Q54.9    Abdominal lymphadenopathy R59.0    REM sleep behavior disorder G47.52    B12 deficiency E53.8    Thrombocytopenia (HCC) D69.6    Cataract H26.9    Coronary artery calcification seen on CT scan I25.10    PVC (premature ventricular contraction) I49.3    Pancytopenia (HCC) D61.818      Assessment:   · GARY   Plan:   · EGD  Colonoscopy today.      Signed By: Fernando Vu MD     1/20/2022  9:23 AM

## 2022-01-20 NOTE — PROGRESS NOTES

## 2022-01-20 NOTE — DISCHARGE INSTRUCTIONS
[facility]. Jessica Pollard M.D.  [phone]                 COLON and EGD DISCHARGE INSTRUCTIONS    2022    Afia Escobar  :  1945  [place] Medical Record Number:  [id number]      DISCOMFORT:  Sore throat- throat lozenges or warm salt water gargle  Redness at IV site- apply warm compress to area; if redness or soreness persist- contact your physician  There may be a slight amount of blood passed from the rectum  Gaseous discomfort- walking, belching will help relieve any discomfort  You may not operate a vehicle for 12 hours  You may not engage in an occupation involving machinery or appliances for rest of today  You may not drink alcoholic beverages for at least 12 hours  Avoid making any critical decisions for at least 24 hour  DIET:   High fiber diet. - however -  remember your colon is empty and a heavy meal will produce gas. Avoid these foods:  vegetables, fried / greasy foods, carbonated drinks for today     ACTIVITY:  You may  resume your normal daily activities it is recommended that you spend the remainder of the day resting -  avoid any strenuous activity and driving. CALL M.D. ANY SIGN OF:   Increasing pain, nausea, vomiting  Abdominal distension (swelling)  New increased bleeding (oral or rectal)  Fever (chills)  Pain in chest area  Bloody discharge from nose or mouth  Shortness of breath      Follow-up Instructions:   Call Dr. Gumaro Thomas if any questions at [phone]. Results of procedure / biopsy in 7 to 10 days, we will call you with these results.   Your endoscopy showed large esophageal varices noted, gastric varices noted   Your colonoscopy showed normal colonoscopy

## 2022-01-20 NOTE — PROCEDURES
Zelia Boxer, M.D.  [phone]           2022                EGD Operative Report  Myrtle Ricketts  :  1945  [address] [place] [address] Medical Record Number:  [id number]      Indication: GARY     : Victor Hugo Anne MD    Assistant -- None  Implants -- None    Referring Provider:  Ambrosio Snyder MD      Anesthesia/Sedation:  MAC anesthesia Propofol    Airway assessment: No airway problems anticipated    Pre-Procedural Exam:      Airway: clear, no airway problems anticipated  Heart: RRR, without gallops or rubs  Lungs: clear bilaterally without wheezes, crackles, or rhonchi  Abdomen: soft, nontender, nondistended, bowel sounds present  Mental Status: awake, alert and oriented to person, place and time       Procedure Details     After infomed consent was obtained for the procedure, with all risks and benefits of procedure explained the patient was taken to the endoscopy suite and placed in the left lateral decubitus position. Following sequential administration of sedation as per above, the endoscope was inserted into the mouth and advanced under direct vision to second portion of the duodenum. A careful inspection was made as the gastroscope was withdrawn, including a retroflexed view of the proximal stomach; findings and interventions are described below. Findings:   Esophagus: 2 columns of large esophageal varices noted. No red lisa sign or stigmata of bleeding identified  Stomach: GOV-1 gastric varix noted. Mild to moderate portal hypertensive gastropathy also noted  Duodenum/jejunum: normal    Therapies:  biopsy of stomach Rule out H. pylori  biopsy of duodenal rule out celiac disease    Specimens: As above           Complications:   None; patient tolerated the procedure well. EBL:  None.            Impression:    2 columns of large esophageal varices                           GOV-I gastric varices noted                Mild to moderate portal hypertensive gastropathy      Recommendations:    -Await pathology.  -Proceed with colonoscopy today as planned  -Follow-up in the office to work-up for liver disease  -Consider starting nonselective beta-blocker therapy in the office versus band ligation    Fernando Vu MD

## 2022-01-20 NOTE — PERIOP NOTES
Vivek Guerrero  1945  [id number]    Situation:  Verbal report received from:  Madeline Walsh RN. Procedure: Procedure(s):  COLONOSCOPY  ESOPHAGOGASTRODUODENOSCOPY (EGD)  ESOPHAGOGASTRODUODENAL (EGD) BIOPSY    Background:    Preoperative diagnosis: IRON DEFICIENCY/ANEMIA  Postoperative diagnosis: 1.- esophageal varicies  2.- portal hypertension  3.- portal gastropathy    :  Dr. Nolberto Sales. Assistant(s): Endoscopy Technician-1: [facility]  Endoscopy RN-1: Justina Imus    Specimens:   ID Type Source Tests Collected by Time Destination   1 : duodenal biopsy Preservative   Darion Albarran MD 1/20/2022 1122 Pathology   2 : gastric biopsy Preservative   Darion Albarran MD 1/20/2022 1123 Pathology     H. Pylori  no      Anesthesia gave intra-procedure sedation and medications, see anesthesia flow sheet yes    Intravenous fluids: NS@ KVO     Vital signs stable yes. Abdominal assessment: round and soft  Yes. Recommendation:  Discharge patient per MD order yes. .  Return to floor- n/a. Family or Friend  Yes.    Permission to share finding with family or friend yes

## 2022-01-20 NOTE — ANESTHESIA POSTPROCEDURE EVALUATION
Procedure(s):  COLONOSCOPY  ESOPHAGOGASTRODUODENOSCOPY (EGD)  ESOPHAGOGASTRODUODENAL (EGD) BIOPSY. MAC    Anesthesia Post Evaluation      Multimodal analgesia: multimodal analgesia used between 6 hours prior to anesthesia start to PACU discharge  Patient location during evaluation: bedside  Patient participation: complete - patient participated  Level of consciousness: awake  Pain management: adequate  Airway patency: patent  Anesthetic complications: no  Cardiovascular status: acceptable  Respiratory status: acceptable  Hydration status: acceptable        INITIAL Post-op Vital signs:   Vitals Value Taken Time   /81 01/20/22 1206   Temp 36.6 °C (97.8 °F) 01/20/22 1146   Pulse 66 01/20/22 1215   Resp 16 01/20/22 1215   SpO2 98 % 01/20/22 1215   Vitals shown include unvalidated device data.

## 2022-01-20 NOTE — ANESTHESIA PREPROCEDURE EVALUATION
Relevant Problems   RESPIRATORY SYSTEM   (+) Obstructive sleep apnea   (+) Sleep apnea      CARDIOVASCULAR   (+) PVC (premature ventricular contraction)      GASTROINTESTINAL   (+) Hepatitis A      ENDOCRINE   (+) Hypothyroidism      HEMATOLOGY   (+) Abdominal lymphadenopathy   (+) Pancytopenia (HCC)       Anesthetic History   No history of anesthetic complications            Review of Systems / Medical History  Patient summary reviewed and pertinent labs reviewed    Pulmonary        Sleep apnea: CPAP        Comments: Allergies   Neuro/Psych   Within defined limits           Cardiovascular  Within defined limits                Exercise tolerance: >4 METS     GI/Hepatic/Renal           Liver disease     Endo/Other      Hypothyroidism: well controlled       Other Findings   Comments:  Thrombocytopenia-platelets run around 80K         Physical Exam    Airway  Mallampati: III  TM Distance: 4 - 6 cm  Neck ROM: normal range of motion   Mouth opening: Normal     Cardiovascular    Rhythm: regular  Rate: normal         Dental    Dentition: Upper dentition intact and Lower dentition intact     Pulmonary  Breath sounds clear to auscultation               Abdominal         Other Findings            Anesthetic Plan    ASA: 2  Anesthesia type: MAC          Induction: Intravenous  Anesthetic plan and risks discussed with: Patient

## 2022-01-25 LAB — PSA, EXTERNAL: 0.61

## 2022-01-26 ENCOUNTER — OFFICE VISIT (OUTPATIENT)
Dept: INTERNAL MEDICINE CLINIC | Age: 77
End: 2022-01-26
Payer: MEDICARE

## 2022-01-26 VITALS
RESPIRATION RATE: 15 BRPM | DIASTOLIC BLOOD PRESSURE: 70 MMHG | SYSTOLIC BLOOD PRESSURE: 138 MMHG | WEIGHT: 196.4 LBS | HEART RATE: 70 BPM | TEMPERATURE: 98.3 F | BODY MASS INDEX: 26.03 KG/M2 | HEIGHT: 73 IN | OXYGEN SATURATION: 99 %

## 2022-01-26 DIAGNOSIS — I86.4 GASTRIC VARICES: ICD-10-CM

## 2022-01-26 DIAGNOSIS — I85.00 ESOPHAGEAL VARICES DETERMINED BY ENDOSCOPY (HCC): ICD-10-CM

## 2022-01-26 DIAGNOSIS — D61.818 PANCYTOPENIA (HCC): ICD-10-CM

## 2022-01-26 DIAGNOSIS — K74.60 CIRRHOSIS OF LIVER WITHOUT ASCITES, UNSPECIFIED HEPATIC CIRRHOSIS TYPE (HCC): ICD-10-CM

## 2022-01-26 DIAGNOSIS — D50.8 OTHER IRON DEFICIENCY ANEMIA: ICD-10-CM

## 2022-01-26 DIAGNOSIS — R73.01 IFG (IMPAIRED FASTING GLUCOSE): ICD-10-CM

## 2022-01-26 DIAGNOSIS — E03.9 ACQUIRED HYPOTHYROIDISM: ICD-10-CM

## 2022-01-26 DIAGNOSIS — D69.6 THROMBOCYTOPENIA (HCC): ICD-10-CM

## 2022-01-26 DIAGNOSIS — N40.0 PROSTATIC ENLARGEMENT: ICD-10-CM

## 2022-01-26 DIAGNOSIS — Z00.00 MEDICARE ANNUAL WELLNESS VISIT, SUBSEQUENT: Primary | ICD-10-CM

## 2022-01-26 PROCEDURE — G0439 PPPS, SUBSEQ VISIT: HCPCS | Performed by: INTERNAL MEDICINE

## 2022-01-26 PROCEDURE — 99214 OFFICE O/P EST MOD 30 MIN: CPT | Performed by: INTERNAL MEDICINE

## 2022-01-26 PROCEDURE — G0463 HOSPITAL OUTPT CLINIC VISIT: HCPCS | Performed by: INTERNAL MEDICINE

## 2022-01-26 PROCEDURE — G8419 CALC BMI OUT NRM PARAM NOF/U: HCPCS | Performed by: INTERNAL MEDICINE

## 2022-01-26 PROCEDURE — 1101F PT FALLS ASSESS-DOCD LE1/YR: CPT | Performed by: INTERNAL MEDICINE

## 2022-01-26 PROCEDURE — G8536 NO DOC ELDER MAL SCRN: HCPCS | Performed by: INTERNAL MEDICINE

## 2022-01-26 PROCEDURE — G8510 SCR DEP NEG, NO PLAN REQD: HCPCS | Performed by: INTERNAL MEDICINE

## 2022-01-26 PROCEDURE — G8427 DOCREV CUR MEDS BY ELIG CLIN: HCPCS | Performed by: INTERNAL MEDICINE

## 2022-01-26 NOTE — PROGRESS NOTES
This is a Subsequent Medicare Annual Wellness Visit providing Personalized Prevention Plan Services (PPPS) (Performed 12 months after initial AWV and PPPS )    I have reviewed the patient's medical history in detail and updated the computerized patient record. Seeing GI Dr. Gumaro Thomas for possible cirrhosis, as seen on US  Elastrography 12/2/21 inconclusive  Colonoscopy 1/20/22. Normal.    Endoscopy 1/20/22 showed large esophageal varices and gastric varices. Normal gastric biopsies. Hx UTI. Cystoscopy 11/23/21 normal w  Dr. Gisell Bryant. PSA 1/25/22 0.609. No sign of prostate cancer. Using urethral dilator daily. Taking finasteride and flomax as prescribed by me. Lab Results   Component Value Date/Time    Prostate Specific Ag 0.4 09/24/2020 09:22 AM    Prostate Specific Ag 0.4 09/26/2019 02:14 PM    Prostate Specific Ag 1.0 05/24/2018 02:25 PM    PSA, External 0.609 01/25/2022 12:00 AM     Hypothyroidism follow-up  Reports  fatigue  denies heat/cold intolerance, bowel/skin changes or CVS symptoms, losing hair, feeling excessive energy, tremulousness, palpitations. Thyroid medication has been unchanged since last medication check and labs. Lab Results   Component Value Date/Time    TSH 0.19 (L) 10/18/2021 09:35 AM    Triiodothyronine (T3), free 2.8 09/26/2019 02:14 PM    T4, Free 1.3 10/19/2021 05:29 AM     Wt Readings from Last 3 Encounters:   01/26/22 196 lb 6.4 oz (89.1 kg)   01/20/22 193 lb 2 oz (87.6 kg)   10/29/21 201 lb 6.4 oz (91.4 kg)       History     Past Medical History:   Diagnosis Date    Abdominal lymphadenopathy 2002    Dr. Daniel.  lymph node enlargement near pancreas. EUS biopsy 1/22/02 benign, but \"possible pneumonia\"    B12 deficiency     Cataract     Dr. Chris Combs Chronic anxiety     Coronary artery calcification seen on CT scan 04/11/2016    patient with LOW cholesterol. A.  EBT (10/4/10):  LM 8, , LCx 0, . Total = 331.     Degenerative joint disease (DJD) of lumbar spine     MRI 3/2017 shows moderate disease and mild stenosis    Environmental allergies     Dr. Sue Hurst. also med allergies ibuprofen    Essential tremor     FH: colon cancer     mother    Hepatitis A     as child    Hypospadias     Hypothyroidism 04/11/2016    saw Dr. Denia Ortez int he past    IFG (impaired fasting glucose) 04/06/2016    a1c 6.7%    Lymphadenopathy 2002    left axillary biopsy 2/14/02 - benign. Dr. Daniel, Dr. Prashanth Betancourt Normal cardiac stress test 04/2016    Dr. Lore Adamson Obstructive sleep apnea     on CPAP. Dr. Brian Lopez [facility])     Dr Linden Lucia. s/p marrow biopsy 11/2020    Plantar fasciitis     Dr. Delfina Abreu.  hx R cortisone injection    Prostatic enlargement 12/6/2017    PVC (premature ventricular contraction)     REM sleep behavior disorder     Screening for skin cancer     sees derm Dr. Ifeanyi Roberts Septic shock ([facility] [address] 04/04/2002    JW    Sleep apnea     Spinal stenosis of lumbar region at multiple levels 12/6/2017    Thrombocytopenia (HCC)     Trigger finger     Dr. Palacio Coad.   s/p R surgery       Past Surgical History:   Procedure Laterality Date    COLONOSCOPY N/A 1/20/2022    COLONOSCOPY performed by Tonja Arthur MD at [facility] HX CATARACT REMOVAL Bilateral 11/2019    Dr. Collins Sutton HX COLONOSCOPY  08/13/2013    4/15/2002, 11/11/2009, 8/13/2013- Dr Dixie Gunn    HX COLONOSCOPY  11/27/2018    tubular adenoma    HX ENDOSCOPY  01/22/2002    Dr Dixie Gunn    HX KNEE ARTHROSCOPY Left     HX LYMPHADENECTOMY  02/14/2002    mediastinoscopy, under left arm for biopsy    HX ORTHOPAEDIC Right     trigger finger    HX TONSILLECTOMY      HX VASECTOMY         Current Outpatient Medications   Medication Sig    levothyroxine (SYNTHROID) 150 mcg tablet TAKE 1 TABLET BY MOUTH DAILY BEFORE BREAKFAST    tamsulosin (FLOMAX) 0.4 mg capsule TAKE 1 CAPSULE BY MOUTH DAILY    fexofenadine-pseudoephedrine (Allegra-D 24 Hour) 180-240 mg per tablet Take 1 Tablet by mouth daily.  guaiFENesin ER (Mucinex) 600 mg ER tablet Take 600 mg by mouth two (2) times a day.  finasteride (PROSCAR) 5 mg tablet TAKE 1 TABLET BY MOUTH DAILY    cyanocobalamin (VITAMIN B-12) 1,000 mcg tablet Take 1,000 mcg by mouth daily.  clonazePAM (KLONOPIN) 0.5 mg tablet Take 0.5 mg by mouth nightly.  multivitamin (ONE A DAY) tablet Take 1 Tab by mouth daily. No current facility-administered medications for this visit. Allergies   Allergen Reactions    Advil [Ibuprofen] Hives    Amitex Pse [Pseudoephedrine-Guaifenesin] Other (comments)     Dry mouth    Hydrocodone-Acetaminophen Rash, Itching and Swelling    Levaquin [Levofloxacin] Rash     Body aches       Family History   Problem Relation Age of Onset    Cancer Mother         colon    Cancer Father         lung        reports that he has never smoked. He has never used smokeless tobacco.   reports no history of alcohol use. Depression Risk Factor Screening:       Alcohol Risk Factor Screening: On any occasion during the past 3 months, have you had more than 3 drinks containing alcohol? No    Do you average more than 14 drinks per week? No      Functional Ability and Level of Safety:     Hearing Loss   mild    Activities of Daily Living   Self-care. Requires assistance with: no ADLs    Fall Risk     Fall Risk Assessment, last 12 mths 10/29/2021   Able to walk? Yes   Fall in past 12 months? 0   Do you feel unsteady? 0   Are you worried about falling 0         Abuse Screen   Patient is not abused    Review of Systems   A comprehensive review of systems was negative except for that written in the HPI. Physical Examination     Evaluation of Cognitive Function:  Mood/affect:  neutral, happy  Appearance: age appropriate  Family member/caregiver input: none    Blood pressure 138/70, pulse 70, temperature 98.3 °F (36.8 °C), temperature source Oral, resp.  rate 15, height 6' 1\" (1.854 m), weight 196 lb 6.4 oz (89.1 kg), SpO2 99 %. General appearance: alert, cooperative, no distress, appears stated age  Neck: supple, symmetrical, trachea midline, no adenopathy, thyroid: not enlarged, symmetric, no tenderness/mass/nodules, no carotid bruit and no JVD  Lungs: clear to auscultation bilaterally  Heart: regular rate and rhythm, S1, S2 normal, no murmur, click, rub or gallop  Extremities: extremities normal, atraumatic, no cyanosis or edema    Patient Care Team:  Raleigh Najjar, MD as PCP - General (Internal Medicine)  Raleigh Najjar, MD as PCP - [facility] Provider  Monique Clancy MD (Pulmonary Disease)      Advice/Referrals/Counseling   Education and counseling provided. See below for specific orders    Assessment/Plan   Diagnoses and all orders for this visit:    1. Medicare annual wellness visit, subsequent  He is UTD    2. Esophageal varices determined by endoscopy ([facility] [address]  3. Cirrhosis of liver without ascites, unspecified hepatic cirrhosis type ([facility] [address]  Seeing Dr. Herminia nelson  Unclear etiology. Fatty liver? May consider propranolol. No hx of bleeding  Avoid asa, nsaids  -     METABOLIC PANEL, COMPREHENSIVE; Future    4. Gastric varices    5. Pancytopenia ([facility] [address]  6. Thrombocytopenia ([facility] [address]  Currently asymptomatic. Due to cirrhosis? F/u Dr. Mildred Owen; Future    7. Acquired hypothyroidism  This condition is controlled on current medication regimen as written in medication list.  Medications refilled. -     TSH 3RD GENERATION; Future  -     T4, FREE; Future  -     LIPID PANEL; Future    8. Prostatic enlargement  Currently asymptomatic    9. IFG (impaired fasting glucose)  The patient is asked to make an attempt to improve diet and exercise patterns to aid in medical management of this problem. -     HEMOGLOBIN A1C WITH EAG; Future    10. Other iron deficiency anemia hx   -     IRON PROFILE; Future  -     FERRITIN; Future    .     Potential medication side effects were discussed with the patient; let me know if any occur.   Return for yearly Annual Wellness Visits

## 2022-01-27 LAB
ALBUMIN SERPL-MCNC: 3.5 G/DL (ref 3.5–5)
ALBUMIN/GLOB SERPL: 0.9 {RATIO} (ref 1.1–2.2)
ALP SERPL-CCNC: 135 U/L (ref 45–117)
ALT SERPL-CCNC: 48 U/L (ref 12–78)
ANION GAP SERPL CALC-SCNC: 2 MMOL/L (ref 5–15)
AST SERPL-CCNC: 56 U/L (ref 15–37)
BASOPHILS # BLD: 0 K/UL (ref 0–0.1)
BASOPHILS NFR BLD: 1 % (ref 0–1)
BILIRUB SERPL-MCNC: 1.3 MG/DL (ref 0.2–1)
BUN SERPL-MCNC: 12 MG/DL (ref 6–20)
BUN/CREAT SERPL: 13 (ref 12–20)
CALCIUM SERPL-MCNC: 9.2 MG/DL (ref 8.5–10.1)
CHLORIDE SERPL-SCNC: 110 MMOL/L (ref 97–108)
CHOLEST SERPL-MCNC: 142 MG/DL
CO2 SERPL-SCNC: 29 MMOL/L (ref 21–32)
CREAT SERPL-MCNC: 0.91 MG/DL (ref 0.7–1.3)
DIFFERENTIAL METHOD BLD: ABNORMAL
EOSINOPHIL # BLD: 0.1 K/UL (ref 0–0.4)
EOSINOPHIL NFR BLD: 3 % (ref 0–7)
ERYTHROCYTE [DISTWIDTH] IN BLOOD BY AUTOMATED COUNT: 14.2 % (ref 11.5–14.5)
EST. AVERAGE GLUCOSE BLD GHB EST-MCNC: 105 MG/DL
FERRITIN SERPL-MCNC: 31 NG/ML (ref 26–388)
GLOBULIN SER CALC-MCNC: 3.7 G/DL (ref 2–4)
GLUCOSE SERPL-MCNC: 110 MG/DL (ref 65–100)
HBA1C MFR BLD: 5.3 % (ref 4–5.6)
HCT VFR BLD AUTO: 38.8 % (ref 36.6–50.3)
HDLC SERPL-MCNC: 60 MG/DL
HDLC SERPL: 2.4 {RATIO} (ref 0–5)
HGB BLD-MCNC: 12.5 G/DL (ref 12.1–17)
IMM GRANULOCYTES # BLD AUTO: 0 K/UL (ref 0–0.04)
IMM GRANULOCYTES NFR BLD AUTO: 0 % (ref 0–0.5)
IRON SATN MFR SERPL: 36 % (ref 20–50)
IRON SERPL-MCNC: 123 UG/DL (ref 35–150)
LDLC SERPL CALC-MCNC: 71.2 MG/DL (ref 0–100)
LYMPHOCYTES # BLD: 0.8 K/UL (ref 0.8–3.5)
LYMPHOCYTES NFR BLD: 25 % (ref 12–49)
MCH RBC QN AUTO: 31.6 PG (ref 26–34)
MCHC RBC AUTO-ENTMCNC: 32.2 G/DL (ref 30–36.5)
MCV RBC AUTO: 98 FL (ref 80–99)
MONOCYTES # BLD: 0.4 K/UL (ref 0–1)
MONOCYTES NFR BLD: 11 % (ref 5–13)
NEUTS SEG # BLD: 2 K/UL (ref 1.8–8)
NEUTS SEG NFR BLD: 60 % (ref 32–75)
NRBC # BLD: 0 K/UL (ref 0–0.01)
NRBC BLD-RTO: 0 PER 100 WBC
PLATELET # BLD AUTO: 111 K/UL (ref 150–400)
PMV BLD AUTO: 10.6 FL (ref 8.9–12.9)
POTASSIUM SERPL-SCNC: 4.4 MMOL/L (ref 3.5–5.1)
PROT SERPL-MCNC: 7.2 G/DL (ref 6.4–8.2)
RBC # BLD AUTO: 3.96 M/UL (ref 4.1–5.7)
SODIUM SERPL-SCNC: 141 MMOL/L (ref 136–145)
T4 FREE SERPL-MCNC: 1 NG/DL (ref 0.8–1.5)
TIBC SERPL-MCNC: 338 UG/DL (ref 250–450)
TRIGL SERPL-MCNC: 54 MG/DL (ref ?–150)
TSH SERPL DL<=0.05 MIU/L-ACNC: 1.99 UIU/ML (ref 0.36–3.74)
VLDLC SERPL CALC-MCNC: 10.8 MG/DL
WBC # BLD AUTO: 3.3 K/UL (ref 4.1–11.1)

## 2022-03-18 PROBLEM — Z71.89 ADVANCED CARE PLANNING/COUNSELING DISCUSSION: Status: ACTIVE | Noted: 2017-09-08

## 2022-03-18 PROBLEM — N40.0 PROSTATIC ENLARGEMENT: Status: ACTIVE | Noted: 2017-12-06

## 2022-03-18 PROBLEM — R73.01 IFG (IMPAIRED FASTING GLUCOSE): Status: ACTIVE | Noted: 2017-02-20

## 2022-03-19 PROBLEM — M48.061 SPINAL STENOSIS OF LUMBAR REGION AT MULTIPLE LEVELS: Status: ACTIVE | Noted: 2017-12-06

## 2022-03-21 DIAGNOSIS — N40.0 PROSTATIC ENLARGEMENT: ICD-10-CM

## 2022-03-21 RX ORDER — FINASTERIDE 5 MG/1
TABLET, FILM COATED ORAL
Qty: 90 TABLET | Refills: 1 | Status: SHIPPED | OUTPATIENT
Start: 2022-03-21 | End: 2022-09-13

## 2022-04-27 DIAGNOSIS — N40.0 PROSTATIC ENLARGEMENT: ICD-10-CM

## 2022-04-28 RX ORDER — TAMSULOSIN HYDROCHLORIDE 0.4 MG/1
CAPSULE ORAL
Qty: 90 CAPSULE | Refills: 1 | Status: SHIPPED | OUTPATIENT
Start: 2022-04-28 | End: 2022-10-25

## 2022-06-02 ENCOUNTER — OFFICE VISIT (OUTPATIENT)
Dept: INTERNAL MEDICINE CLINIC | Age: 77
End: 2022-06-02
Payer: MEDICARE

## 2022-06-02 VITALS
OXYGEN SATURATION: 95 % | HEIGHT: 73 IN | HEART RATE: 61 BPM | BODY MASS INDEX: 27.06 KG/M2 | RESPIRATION RATE: 15 BRPM | SYSTOLIC BLOOD PRESSURE: 120 MMHG | DIASTOLIC BLOOD PRESSURE: 67 MMHG | WEIGHT: 204.2 LBS | TEMPERATURE: 98.4 F

## 2022-06-02 DIAGNOSIS — K74.60 CIRRHOSIS OF LIVER WITHOUT ASCITES, UNSPECIFIED HEPATIC CIRRHOSIS TYPE (HCC): Primary | ICD-10-CM

## 2022-06-02 DIAGNOSIS — D61.818 PANCYTOPENIA (HCC): ICD-10-CM

## 2022-06-02 DIAGNOSIS — R73.01 IFG (IMPAIRED FASTING GLUCOSE): ICD-10-CM

## 2022-06-02 DIAGNOSIS — I86.4 GASTRIC VARICES: ICD-10-CM

## 2022-06-02 PROBLEM — C44.629 SCC (SQUAMOUS CELL CARCINOMA), HAND, LEFT: Status: ACTIVE | Noted: 2022-02-23

## 2022-06-02 PROCEDURE — G8427 DOCREV CUR MEDS BY ELIG CLIN: HCPCS | Performed by: INTERNAL MEDICINE

## 2022-06-02 PROCEDURE — G8417 CALC BMI ABV UP PARAM F/U: HCPCS | Performed by: INTERNAL MEDICINE

## 2022-06-02 PROCEDURE — G8510 SCR DEP NEG, NO PLAN REQD: HCPCS | Performed by: INTERNAL MEDICINE

## 2022-06-02 PROCEDURE — 99213 OFFICE O/P EST LOW 20 MIN: CPT | Performed by: INTERNAL MEDICINE

## 2022-06-02 PROCEDURE — G0463 HOSPITAL OUTPT CLINIC VISIT: HCPCS | Performed by: INTERNAL MEDICINE

## 2022-06-02 PROCEDURE — 1101F PT FALLS ASSESS-DOCD LE1/YR: CPT | Performed by: INTERNAL MEDICINE

## 2022-06-02 PROCEDURE — G8536 NO DOC ELDER MAL SCRN: HCPCS | Performed by: INTERNAL MEDICINE

## 2022-06-02 RX ORDER — LEVOCETIRIZINE DIHYDROCHLORIDE 5 MG/1
TABLET, FILM COATED ORAL
COMMUNITY

## 2022-06-02 RX ORDER — CARVEDILOL 3.12 MG/1
3.12 TABLET ORAL DAILY
COMMUNITY
Start: 2022-04-28

## 2022-06-02 NOTE — PROGRESS NOTES
Presents for f/u. Seeing GI Dr. Sai Valdes for possible cirrhosis, as seen on US  Elastrography 12/2/21 inconclusive  Colonoscopy 1/20/22. Normal.    Endoscopy 1/20/22 showed large esophageal varices and gastric varices. Normal gastric biopsies. US 4/11/22 \"normal\" with no liver lesions  Repeat labs, US pending 10/2022    L hand SCC excised 2/23/22     Dental crown pending. Seeing Dr. Ann-Marie Sun in [facility] for pancytopenia. Marrow bx neg last year,    Hypothyroidism follow-up  Reports mild  fatigue  denies heat/cold intolerance, bowel/skin changes or CVS symptoms, losing hair, feeling excessive energy, tremulousness, palpitations. Thyroid medication has been unchanged since last medication check and labs. Lab Results   Component Value Date/Time    TSH 1.99 01/26/2022 01:59 PM    Triiodothyronine (T3), free 2.8 09/26/2019 02:14 PM    T4, Free 1.0 01/26/2022 01:59 PM     Wt Readings from Last 3 Encounters:   06/02/22 204 lb 3.2 oz (92.6 kg)   01/26/22 196 lb 6.4 oz (89.1 kg)   01/20/22 193 lb 2 oz (87.6 kg)       History     Past Medical History:   Diagnosis Date    Abdominal lymphadenopathy 2002    Dr. Daniel.  lymph node enlargement near pancreas. EUS biopsy 1/22/02 benign, but \"possible pneumonia\"    B12 deficiency     Cataract     Dr. Keke Slaughter Chronic anxiety     Coronary artery calcification seen on CT scan 04/11/2016    patient with LOW cholesterol. A.  EBT (10/4/10):  LM 8, , LCx 0, . Total = 331.  Degenerative joint disease (DJD) of lumbar spine     MRI 3/2017 shows moderate disease and mild stenosis    Environmental allergies     Dr. Neftaly Hamilton. also med allergies ibuprofen    Essential tremor     FH: colon cancer     mother    Hepatitis A     as child    Hypospadias     Hypothyroidism 04/11/2016    saw Dr. Joo Bird int he past    IFG (impaired fasting glucose) 04/06/2016    a1c 6.7%    Lymphadenopathy 2002    left axillary biopsy 2/14/02 - benign.   Dr. Daniel, Dr. Stacy Correa Mononucleosis     Normal cardiac stress test 04/2016    Dr. Angi High    Obstructive sleep apnea     on CPAP. Dr. Ranjith Jasso [facility])     Dr Erin Pollard. s/p marrow biopsy 11/2020    Plantar fasciitis     Dr. Ynes Walsh.  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 Elsie Martel for skin cancer     sees derm Dr. Navarro Giraldo Septic shock [facility]) 04/04/2002    JW    Sleep apnea     Spinal stenosis of lumbar region at multiple levels 12/6/2017    Thrombocytopenia (HCC)     Trigger finger     Dr. Zulema Riley. s/p R surgery       Past Surgical History:   Procedure Laterality Date    COLONOSCOPY N/A 1/20/2022    COLONOSCOPY performed by Zulema Santizo MD at [address] [facility] HX CATARACT REMOVAL Bilateral 11/2019    Dr. Cammie Wu HX COLONOSCOPY  08/13/2013    4/15/2002, 11/11/2009, 8/13/2013- Dr Ronel Gilliam    HX COLONOSCOPY  11/27/2018    tubular adenoma    HX ENDOSCOPY  01/22/2002    Dr Ronel Gilliam    HX KNEE ARTHROSCOPY Left     HX LYMPHADENECTOMY  02/14/2002    mediastinoscopy, under left arm for biopsy    HX MALIGNANT SKIN LESION EXCISION  02/2022    Left hand/knuckle    HX ORTHOPAEDIC Right     trigger finger    HX TONSILLECTOMY      HX VASECTOMY         Current Outpatient Medications   Medication Sig    levocetirizine (Xyzal) 5 mg tablet Take  by mouth daily as needed for Allergies.  carvediloL (COREG) 3.125 mg tablet Take 3.125 mg by mouth daily.  tamsulosin (FLOMAX) 0.4 mg capsule TAKE 1 CAPSULE BY MOUTH DAILY    finasteride (PROSCAR) 5 mg tablet TAKE 1 TABLET BY MOUTH DAILY    levothyroxine (SYNTHROID) 150 mcg tablet TAKE 1 TABLET BY MOUTH DAILY BEFORE BREAKFAST    guaiFENesin ER (Mucinex) 600 mg ER tablet Take 600 mg by mouth two (2) times a day.     cyanocobalamin (VITAMIN B-12) 1,000 mcg tablet Take 1,000 mcg by mouth daily.    clonazePAM (KLONOPIN) 0.5 mg tablet Take 0.5 mg by mouth nightly.  multivitamin (ONE A DAY) tablet Take 1 Tab by mouth daily.  fexofenadine-pseudoephedrine (Allegra-D 24 Hour) 180-240 mg per tablet Take 1 Tablet by mouth daily. (Patient not taking: Reported on 6/2/2022)     No current facility-administered medications for this visit. Allergies   Allergen Reactions    Advil [Ibuprofen] Hives    Amitex Pse [Pseudoephedrine-Guaifenesin] Other (comments)     Dry mouth    Hydrocodone-Acetaminophen Rash, Itching and Swelling    Levaquin [Levofloxacin] Rash     Body aches       Family History   Problem Relation Age of Onset    Cancer Mother         colon    Cancer Father         lung        reports that he has never smoked. He has never used smokeless tobacco.   reports no history of alcohol use. Review of Systems   A comprehensive review of systems was negative except for that written in the HPI. Physical Examination     Blood pressure 120/67, pulse 61, temperature 98.4 °F (36.9 °C), temperature source Oral, resp. rate 15, height 6' 1\" (1.854 m), weight 204 lb 3.2 oz (92.6 kg), SpO2 95 %. General appearance: alert, cooperative, no distress, appears stated age  Neck: supple, symmetrical, trachea midline, no adenopathy, thyroid: not enlarged, symmetric, no tenderness/mass/nodules, no carotid bruit and no JVD  Lungs: clear to auscultation bilaterally  Heart: regular rate and rhythm, S1, S2 normal, no murmur, click, rub or gallop  Extremities: extremities normal, atraumatic, no cyanosis or edema    Patient Care Team:  Juan Guerra MD as PCP - General (Internal Medicine Physician)  Juan Guerra MD as PCP - [facility] Provider  Afsaneh Masters MD (Pulmonary Disease)      Advice/Referrals/Counseling   Education and counseling provided. See below for specific orders    Assessment/Plan   Diagnoses and all orders for this visit:      1.  Esophageal varices determined by endoscopy ([facility] [address]  2. Cirrhosis of liver without ascites, unspecified hepatic cirrhosis type ([facility] [address]  3. Gastric varices  Seeing Dr. Oliver nelson  Unclear etiology. Fatty liver? May consider propranolol. No hx of bleeding  Avoid asa, nsaids    5. Pancytopenia ([facility] [address]  6. Thrombocytopenia ([facility]  Currently asymptomatic. Due to cirrhosis? F/u Dr. Michael Schmitz    7. Acquired hypothyroidism  This condition is controlled on current medication regimen as written in medication list.  Medications refilled. Return to clinic in 6 months to repeat your blood work. 8. Prostatic enlargement  Currently asymptomatic    9. IFG (impaired fasting glucose)  The patient is asked to make an attempt to improve diet and exercise patterns to aid in medical management of this problem. Joni Campbell Potential medication side effects were discussed with the patient; let me know if any occur.   Return for yearly Annual Wellness Visits

## 2022-07-27 ENCOUNTER — OFFICE VISIT (OUTPATIENT)
Dept: INTERNAL MEDICINE CLINIC | Age: 77
End: 2022-07-27
Payer: MEDICARE

## 2022-07-27 VITALS
TEMPERATURE: 98 F | SYSTOLIC BLOOD PRESSURE: 131 MMHG | WEIGHT: 207.6 LBS | DIASTOLIC BLOOD PRESSURE: 78 MMHG | HEART RATE: 80 BPM | HEIGHT: 73 IN | OXYGEN SATURATION: 97 % | RESPIRATION RATE: 15 BRPM | BODY MASS INDEX: 27.51 KG/M2

## 2022-07-27 DIAGNOSIS — K74.60 CIRRHOSIS OF LIVER WITHOUT ASCITES, UNSPECIFIED HEPATIC CIRRHOSIS TYPE (HCC): ICD-10-CM

## 2022-07-27 DIAGNOSIS — D61.818 PANCYTOPENIA (HCC): ICD-10-CM

## 2022-07-27 DIAGNOSIS — E55.9 VITAMIN D DEFICIENCY: ICD-10-CM

## 2022-07-27 DIAGNOSIS — E53.8 B12 DEFICIENCY: ICD-10-CM

## 2022-07-27 DIAGNOSIS — D50.8 OTHER IRON DEFICIENCY ANEMIA: ICD-10-CM

## 2022-07-27 DIAGNOSIS — E03.9 ACQUIRED HYPOTHYROIDISM: ICD-10-CM

## 2022-07-27 DIAGNOSIS — M79.10 MYALGIA: Primary | ICD-10-CM

## 2022-07-27 DIAGNOSIS — R73.01 IFG (IMPAIRED FASTING GLUCOSE): ICD-10-CM

## 2022-07-27 LAB
25(OH)D3 SERPL-MCNC: 24 NG/ML (ref 30–100)
ALBUMIN SERPL-MCNC: 3.2 G/DL (ref 3.5–5)
ALBUMIN/GLOB SERPL: 0.9 {RATIO} (ref 1.1–2.2)
ALP SERPL-CCNC: 125 U/L (ref 45–117)
ALT SERPL-CCNC: 47 U/L (ref 12–78)
ANION GAP SERPL CALC-SCNC: 7 MMOL/L (ref 5–15)
AST SERPL-CCNC: 49 U/L (ref 15–37)
BILIRUB SERPL-MCNC: 1 MG/DL (ref 0.2–1)
BUN SERPL-MCNC: 19 MG/DL (ref 6–20)
BUN/CREAT SERPL: 22 (ref 12–20)
CALCIUM SERPL-MCNC: 9.2 MG/DL (ref 8.5–10.1)
CHLORIDE SERPL-SCNC: 109 MMOL/L (ref 97–108)
CK SERPL-CCNC: 59 U/L (ref 39–308)
CO2 SERPL-SCNC: 26 MMOL/L (ref 21–32)
COMMENT, HOLDF: NORMAL
CREAT SERPL-MCNC: 0.86 MG/DL (ref 0.7–1.3)
ERYTHROCYTE [SEDIMENTATION RATE] IN BLOOD: 9 MM/HR (ref 0–20)
EST. AVERAGE GLUCOSE BLD GHB EST-MCNC: 123 MG/DL
FERRITIN SERPL-MCNC: 24 NG/ML (ref 26–388)
GLOBULIN SER CALC-MCNC: 3.6 G/DL (ref 2–4)
GLUCOSE SERPL-MCNC: 106 MG/DL (ref 65–100)
HBA1C MFR BLD: 5.9 % (ref 4–5.6)
IRON SATN MFR SERPL: 28 % (ref 20–50)
IRON SERPL-MCNC: 94 UG/DL (ref 35–150)
POTASSIUM SERPL-SCNC: 4.4 MMOL/L (ref 3.5–5.1)
PROT SERPL-MCNC: 6.8 G/DL (ref 6.4–8.2)
SAMPLES BEING HELD,HOLD: NORMAL
SODIUM SERPL-SCNC: 142 MMOL/L (ref 136–145)
T4 FREE SERPL-MCNC: 1.7 NG/DL (ref 0.8–1.5)
TIBC SERPL-MCNC: 331 UG/DL (ref 250–450)
TSH SERPL DL<=0.05 MIU/L-ACNC: 0.05 UIU/ML (ref 0.36–3.74)
VIT B12 SERPL-MCNC: 609 PG/ML (ref 193–986)

## 2022-07-27 PROCEDURE — G8536 NO DOC ELDER MAL SCRN: HCPCS | Performed by: INTERNAL MEDICINE

## 2022-07-27 PROCEDURE — G0463 HOSPITAL OUTPT CLINIC VISIT: HCPCS | Performed by: INTERNAL MEDICINE

## 2022-07-27 PROCEDURE — G8510 SCR DEP NEG, NO PLAN REQD: HCPCS | Performed by: INTERNAL MEDICINE

## 2022-07-27 PROCEDURE — G8427 DOCREV CUR MEDS BY ELIG CLIN: HCPCS | Performed by: INTERNAL MEDICINE

## 2022-07-27 PROCEDURE — G8417 CALC BMI ABV UP PARAM F/U: HCPCS | Performed by: INTERNAL MEDICINE

## 2022-07-27 PROCEDURE — 99214 OFFICE O/P EST MOD 30 MIN: CPT | Performed by: INTERNAL MEDICINE

## 2022-07-27 PROCEDURE — 1101F PT FALLS ASSESS-DOCD LE1/YR: CPT | Performed by: INTERNAL MEDICINE

## 2022-07-27 RX ORDER — LEVOTHYROXINE SODIUM 150 UG/1
TABLET ORAL
Qty: 90 TABLET | Refills: 1 | Status: SHIPPED | OUTPATIENT
Start: 2022-07-27 | End: 2022-07-28 | Stop reason: DRUGHIGH

## 2022-07-27 NOTE — PROGRESS NOTES
HISTORY OF PRESENT ILLNESS    Chief Complaint   Patient presents with    Fatigue    Extremity Weakness     Arms and legs - a couple of months    Labs     Fasting. Presents for follow-up    He is concerned about significant fatigue. Feels he has some weakness and fatigability of his arms and legs. Ongoing for couple of months. Denies any chest pain, shortness of breath, edema. He is taking levothyroxine 150 mcg daily for hypothyroidism. He is concerned about his thyroid function being off. .  Wt Readings from Last 3 Encounters:   07/27/22 207 lb 9.6 oz (94.2 kg)   06/02/22 204 lb 3.2 oz (92.6 kg)   01/26/22 196 lb 6.4 oz (89.1 kg)       Seeing Dr. Judy Gonzalez in [facility] for pancytopenia. Marrow bx neg last year. Labs per Dr. Judy Gonzalez 6/21/22 WBC 3.0, Hgb 13.1, Plt 99. Follow-up 6 months    Review of Systems   All other systems reviewed and are negative, except as noted in HPI    Past Medical and Surgical History   has a past medical history of Abdominal lymphadenopathy (2002), B12 deficiency, Cataract, Chronic anxiety, Coronary artery calcification seen on CT scan (04/11/2016), Degenerative joint disease (DJD) of lumbar spine, Environmental allergies, Essential tremor, FH: colon cancer, Hepatitis A, Hypospadias, Hypothyroidism (04/11/2016), IFG (impaired fasting glucose) (04/06/2016), Lymphadenopathy (2002), Mononucleosis, Normal cardiac stress test (04/2016), Obstructive sleep apnea, Pancytopenia ([address] Plantar fasciitis, Prostatic enlargement (12/6/2017), PVC (premature ventricular contraction), REM sleep behavior disorder, SCC (squamous cell carcinoma), hand, left (02/23/2022), Screening for skin cancer, Septic shock ([address] (04/04/2002), Sleep apnea, Spinal stenosis of lumbar region at multiple levels (12/6/2017), Thrombocytopenia ([address] and Trigger finger.    has a past surgical history that includes hx tonsillectomy; hx vasectomy; hx endoscopy (01/22/2002); hx lymphadenectomy (02/14/2002); hx knee arthroscopy (Left); hx colonoscopy (08/13/2013); hx orthopaedic (Right); hx colonoscopy (11/27/2018); hx cataract removal (Bilateral, 11/2019); colonoscopy (N/A, 1/20/2022); and hx malignant skin lesion excision (02/2022). reports that he has never smoked. He has never used smokeless tobacco. He reports that he does not drink alcohol and does not use drugs. family history includes Cancer in his father and mother. Physical Exam   Nursing note and vitals reviewed. Blood pressure 131/78, pulse 80, temperature 98 °F (36.7 °C), temperature source Oral, resp. rate 15, height 6' 1\" (1.854 m), weight 207 lb 9.6 oz (94.2 kg), SpO2 97 %. Constitutional:  No distress. Eyes: Conjunctivae are normal.   Ears:  Hearing grossly intact  Cardiovascular: Normal rate. regular rhythm, no murmurs or gallops  No edema  Pulmonary/Chest: Effort normal.   CTAB  Musculoskeletal: moves all 4 extremities   Neurological: Alert and oriented to person, place, and time. Skin: No visible rash noted. Psychiatric: Normal mood and affect. Behavior is normal.     Assessment and Plan    Diagnoses and all orders for this visit:    1. Myalgia  Bilateral upper and lower extremity myalgias. Unclear etiology. Not on statin therapy. Unclear etiology. Check iron levels, thyroid, rule out PMR  -     VITAMIN D, 25 HYDROXY; Future  -     CK; Future  -     SED RATE (ESR); Future    2. Acquired hypothyroidism  Unclear control. Weight is stable. Myalgias as above. Check labs. -     TSH 3RD GENERATION; Future  -     T4, FREE; Future    3. Other iron deficiency anemia  Screening. Unclear if this is related to his current symptoms.  -     IRON PROFILE; Future  -     FERRITIN; Future    4. Vitamin D deficiency  Taking multivitamin daily. Vitamin D levels and potential contributing factors to above. -     VITAMIN D, 25 HYDROXY; Future  -     VITAMIN D, 25 HYDROXY; Future    5. B12 deficiency  -     VITAMIN B12; Future    6.  IFG (impaired fasting glucose)  The patient is asked to make an attempt to improve diet and exercise patterns to aid in medical management of this problem. -     HEMOGLOBIN A1C WITH EAG; Future    7. Cirrhosis of liver without ascites, unspecified hepatic cirrhosis type ([facility]  Asymptomatic. No overt symptoms of encephalopathy.  -     METABOLIC PANEL, COMPREHENSIVE; Future    8. Pancytopenia ([facility]  Followed by hematology. Platelets and white count remain a little bit low but hemoglobin is stable. lab results and schedule of future lab studies reviewed with patient  reviewed medications and side effects in detail    Return to clinic for further evaluation if new symptoms develop        Current Outpatient Medications   Medication Sig    levocetirizine (XYZAL) 5 mg tablet Take  by mouth daily as needed for Allergies. carvediloL (COREG) 3.125 mg tablet Take 3.125 mg by mouth daily. tamsulosin (FLOMAX) 0.4 mg capsule TAKE 1 CAPSULE BY MOUTH DAILY    finasteride (PROSCAR) 5 mg tablet TAKE 1 TABLET BY MOUTH DAILY    guaiFENesin ER (MUCINEX) 600 mg ER tablet Take 600 mg by mouth two (2) times a day. cyanocobalamin 1,000 mcg tablet Take 1,000 mcg by mouth daily. clonazePAM (KLONOPIN) 0.5 mg tablet Take 0.5 mg by mouth nightly. multivitamin (ONE A DAY) tablet Take 1 Tab by mouth daily. levothyroxine (SYNTHROID) 150 mcg tablet TAKE 1 TABLET BY MOUTH DAILY BEFORE AND BREAKFAST     No current facility-administered medications for this visit.

## 2022-07-28 RX ORDER — LEVOTHYROXINE SODIUM 125 UG/1
125 TABLET ORAL
Qty: 90 TABLET | Refills: 0 | Status: SHIPPED | OUTPATIENT
Start: 2022-07-28 | End: 2022-10-23 | Stop reason: ALTCHOICE

## 2022-09-13 DIAGNOSIS — N40.0 PROSTATIC ENLARGEMENT: ICD-10-CM

## 2022-09-13 RX ORDER — FINASTERIDE 5 MG/1
TABLET, FILM COATED ORAL
Qty: 90 TABLET | Refills: 1 | Status: SHIPPED | OUTPATIENT
Start: 2022-09-13

## 2022-10-20 ENCOUNTER — OFFICE VISIT (OUTPATIENT)
Dept: INTERNAL MEDICINE CLINIC | Age: 77
End: 2022-10-20
Payer: MEDICARE

## 2022-10-20 VITALS
WEIGHT: 206.4 LBS | TEMPERATURE: 98.1 F | RESPIRATION RATE: 15 BRPM | OXYGEN SATURATION: 96 % | HEIGHT: 73 IN | HEART RATE: 60 BPM | DIASTOLIC BLOOD PRESSURE: 64 MMHG | BODY MASS INDEX: 27.35 KG/M2 | SYSTOLIC BLOOD PRESSURE: 117 MMHG

## 2022-10-20 DIAGNOSIS — H00.014 HORDEOLUM EXTERNUM OF LEFT UPPER EYELID: ICD-10-CM

## 2022-10-20 DIAGNOSIS — E55.9 VITAMIN D DEFICIENCY: ICD-10-CM

## 2022-10-20 DIAGNOSIS — D50.8 OTHER IRON DEFICIENCY ANEMIA: ICD-10-CM

## 2022-10-20 DIAGNOSIS — E03.9 ACQUIRED HYPOTHYROIDISM: Primary | ICD-10-CM

## 2022-10-20 PROCEDURE — G8536 NO DOC ELDER MAL SCRN: HCPCS | Performed by: INTERNAL MEDICINE

## 2022-10-20 PROCEDURE — G0463 HOSPITAL OUTPT CLINIC VISIT: HCPCS | Performed by: INTERNAL MEDICINE

## 2022-10-20 PROCEDURE — G8417 CALC BMI ABV UP PARAM F/U: HCPCS | Performed by: INTERNAL MEDICINE

## 2022-10-20 PROCEDURE — G8510 SCR DEP NEG, NO PLAN REQD: HCPCS | Performed by: INTERNAL MEDICINE

## 2022-10-20 PROCEDURE — 1101F PT FALLS ASSESS-DOCD LE1/YR: CPT | Performed by: INTERNAL MEDICINE

## 2022-10-20 PROCEDURE — 99213 OFFICE O/P EST LOW 20 MIN: CPT | Performed by: INTERNAL MEDICINE

## 2022-10-20 PROCEDURE — G8427 DOCREV CUR MEDS BY ELIG CLIN: HCPCS | Performed by: INTERNAL MEDICINE

## 2022-10-20 RX ORDER — ACETAMINOPHEN 500 MG
2000 TABLET ORAL DAILY
Qty: 30 CAPSULE | Refills: 11
Start: 2022-10-20

## 2022-10-20 NOTE — PROGRESS NOTES
HISTORY OF PRESENT ILLNESS    Chief Complaint   Patient presents with    Medication Evaluation     Thyroid med. Presents for follow-up    Medial branch nerve block pending tomorrow w Dr Angela Blanton. Dr Dutch Owens checked liver labs. EGD pending 1/2023 to eval varices    Hypothyroidism follow-up  Reports improving fatigue  denies heat/cold intolerance, bowel/skin changes or CVS symptoms, losing hair, feeling excessive energy, tremulousness, palpitations. Thyroid medication has been decreased since last medication check and labs. Lab Results   Component Value Date/Time    TSH 0.06 (L) 10/20/2022 02:50 PM    Triiodothyronine (T3), free 2.8 09/26/2019 02:14 PM    T4, Free 1.4 10/20/2022 02:50 PM     Wt Readings from Last 3 Encounters:   10/20/22 206 lb 6.4 oz (93.6 kg)   07/27/22 207 lb 9.6 oz (94.2 kg)   06/02/22 204 lb 3.2 oz (92.6 kg)     Iron deficiency. Taking slow Fe for 3 months  Lab Results   Component Value Date/Time    Iron 94 07/27/2022 10:06 AM    TIBC 331 07/27/2022 10:06 AM    Iron % saturation 28 07/27/2022 10:06 AM    Ferritin 44 10/20/2022 02:50 PM     Lab Results   Component Value Date/Time    HGB 13.6 10/20/2022 02:50 PM     Vit D def.   Started 2000 units daily last visit  Lab Results   Component Value Date/Time    Vitamin D 25-Hydroxy 37.9 10/20/2022 02:50 PM         Review of Systems   All other systems reviewed and are negative, except as noted in HPI    Past Medical and Surgical History   has a past medical history of Abdominal lymphadenopathy (2002), B12 deficiency, Cataract, Chronic anxiety, Coronary artery calcification seen on CT scan (04/11/2016), Degenerative joint disease (DJD) of lumbar spine, Environmental allergies, Essential tremor, FH: colon cancer, Hepatitis A, Hypospadias, Hypothyroidism (04/11/2016), IFG (impaired fasting glucose) (04/06/2016), Lymphadenopathy (2002), Mononucleosis, Normal cardiac stress test (04/2016), Obstructive sleep apnea, Pancytopenia ([address] Plantar fasciitis, Prostatic enlargement (12/6/2017), PVC (premature ventricular contraction), REM sleep behavior disorder, SCC (squamous cell carcinoma), hand, 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 has a past surgical history that includes hx tonsillectomy; hx vasectomy; hx endoscopy (01/22/2002); hx lymphadenectomy (02/14/2002); hx knee arthroscopy (Left); hx colonoscopy (08/13/2013); hx orthopaedic (Right); hx colonoscopy (11/27/2018); hx cataract removal (Bilateral, 11/2019); colonoscopy (N/A, 1/20/2022); and hx malignant skin lesion excision (02/2022). reports that he has never smoked. He has never used smokeless tobacco. He reports that he does not drink alcohol and does not use drugs. family history includes Cancer in his father and mother. Physical Exam   Nursing note and vitals reviewed. Blood pressure 117/64, pulse 60, temperature 98.1 °F (36.7 °C), temperature source Oral, resp. rate 15, height 6' 1\" (1.854 m), weight 206 lb 6.4 oz (93.6 kg), SpO2 96 %. Constitutional:  No distress. Eyes: Conjunctivae are normal.   Ears:  Hearing grossly intact  Left upper eyelid with mild stye. Nondraining nontender. Cardiovascular: Normal rate. regular rhythm, no murmurs or gallops  No edema  Pulmonary/Chest: Effort normal.   CTAB  Musculoskeletal: moves all 4 extremities   Neurological: Alert and oriented to person, place, and time. Skin: No visible rash noted. Psychiatric: Normal mood and affect. Behavior is normal.     Assessment and Plan    Diagnoses and all orders for this visit:    1. Acquired hypothyroidism  No overt symptomatology, but TSH remains low. Previous fatigue is likely because of iron deficiency.   Free T4 is now normal.  Will decrease dose a little bit to 112 mcg daily and repeat labs in 3 to 4 months.  -     TSH 3RD GENERATION; Future  -     T4, FREE; Future  -     Unithroid 112 mcg tablet; Take 1 Tablet by mouth Daily (before breakfast). Decreased 10/23/22    2. Other iron deficiency anemia  Resolved. Continue iron supplementation if tolerable. Fatigue has improved. -     FERRITIN; Future  -     CBC W/O DIFF; Future  -     ferrous sulfate (SLOW FE) 142 mg (45 mg iron) ER tablet; Take 1 Tablet by mouth Daily (before breakfast). 3. Vitamin D deficiency  Improved on supplement. Continue daily. -     VITAMIN D, 25 HYDROXY; Future  -     cholecalciferol (VITAMIN D3) (2,000 UNITS /50 MCG) cap capsule; Take 1 Capsule by mouth daily. 4. Hordeolum externum of left upper eyelid  Warm compresses. Consult ophthalmology if not improving. lab results and schedule of future lab studies reviewed with patient  reviewed medications and side effects in detail    Return to clinic for further evaluation if new symptoms develop        Current Outpatient Medications   Medication Sig    ferrous sulfate (SLOW FE) 142 mg (45 mg iron) ER tablet Take 1 Tablet by mouth Daily (before breakfast). cholecalciferol (VITAMIN D3) (2,000 UNITS /50 MCG) cap capsule Take 1 Capsule by mouth daily. finasteride (PROSCAR) 5 mg tablet TAKE 1 TABLET BY MOUTH DAILY    Unithroid 125 mcg tablet Take 1 Tablet by mouth Daily (before breakfast). Indications: a condition with low thyroid hormone levels    levocetirizine (XYZAL) 5 mg tablet Take  by mouth daily as needed for Allergies. carvediloL (COREG) 3.125 mg tablet Take 3.125 mg by mouth daily. tamsulosin (FLOMAX) 0.4 mg capsule TAKE 1 CAPSULE BY MOUTH DAILY    cyanocobalamin 1,000 mcg tablet Take 1,000 mcg by mouth daily. clonazePAM (KLONOPIN) 0.5 mg tablet Take 0.5 mg by mouth nightly. multivitamin (ONE A DAY) tablet Take 1 Tab by mouth daily. No current facility-administered medications for this visit.

## 2022-10-21 LAB
25(OH)D3 SERPL-MCNC: 37.9 NG/ML (ref 30–100)
ERYTHROCYTE [DISTWIDTH] IN BLOOD BY AUTOMATED COUNT: 14.4 % (ref 11.5–14.5)
FERRITIN SERPL-MCNC: 44 NG/ML (ref 26–388)
HCT VFR BLD AUTO: 39.9 % (ref 36.6–50.3)
HGB BLD-MCNC: 13.6 G/DL (ref 12.1–17)
MCH RBC QN AUTO: 33.1 PG (ref 26–34)
MCHC RBC AUTO-ENTMCNC: 34.1 G/DL (ref 30–36.5)
MCV RBC AUTO: 97.1 FL (ref 80–99)
NRBC # BLD: 0 K/UL (ref 0–0.01)
NRBC BLD-RTO: 0 PER 100 WBC
PLATELET # BLD AUTO: 116 K/UL (ref 150–400)
PMV BLD AUTO: 10.6 FL (ref 8.9–12.9)
RBC # BLD AUTO: 4.11 M/UL (ref 4.1–5.7)
T4 FREE SERPL-MCNC: 1.4 NG/DL (ref 0.8–1.5)
TSH SERPL DL<=0.05 MIU/L-ACNC: 0.06 UIU/ML (ref 0.36–3.74)
WBC # BLD AUTO: 3.9 K/UL (ref 4.1–11.1)

## 2022-10-23 RX ORDER — LEVOTHYROXINE SODIUM 112 UG/1
112 TABLET ORAL
Qty: 90 TABLET | Refills: 1 | Status: SHIPPED | OUTPATIENT
Start: 2022-10-23

## 2022-10-25 DIAGNOSIS — N40.0 PROSTATIC ENLARGEMENT: ICD-10-CM

## 2022-10-25 RX ORDER — TAMSULOSIN HYDROCHLORIDE 0.4 MG/1
CAPSULE ORAL
Qty: 90 CAPSULE | Refills: 1 | Status: SHIPPED | OUTPATIENT
Start: 2022-10-25

## 2023-01-03 ENCOUNTER — TELEPHONE (OUTPATIENT)
Dept: INTERNAL MEDICINE CLINIC | Age: 78
End: 2023-01-03

## 2023-01-03 ENCOUNTER — OFFICE VISIT (OUTPATIENT)
Dept: INTERNAL MEDICINE CLINIC | Age: 78
End: 2023-01-03
Payer: MEDICARE

## 2023-01-03 VITALS
HEART RATE: 61 BPM | WEIGHT: 204 LBS | HEIGHT: 73 IN | OXYGEN SATURATION: 95 % | RESPIRATION RATE: 15 BRPM | BODY MASS INDEX: 27.04 KG/M2 | SYSTOLIC BLOOD PRESSURE: 137 MMHG | DIASTOLIC BLOOD PRESSURE: 81 MMHG | TEMPERATURE: 98 F

## 2023-01-03 DIAGNOSIS — D69.6 THROMBOCYTOPENIA (HCC): ICD-10-CM

## 2023-01-03 DIAGNOSIS — D61.818 PANCYTOPENIA (HCC): ICD-10-CM

## 2023-01-03 DIAGNOSIS — J40 BRONCHITIS: Primary | ICD-10-CM

## 2023-01-03 PROCEDURE — G8510 SCR DEP NEG, NO PLAN REQD: HCPCS | Performed by: INTERNAL MEDICINE

## 2023-01-03 PROCEDURE — G8417 CALC BMI ABV UP PARAM F/U: HCPCS | Performed by: INTERNAL MEDICINE

## 2023-01-03 PROCEDURE — 99213 OFFICE O/P EST LOW 20 MIN: CPT | Performed by: INTERNAL MEDICINE

## 2023-01-03 PROCEDURE — G8536 NO DOC ELDER MAL SCRN: HCPCS | Performed by: INTERNAL MEDICINE

## 2023-01-03 PROCEDURE — 1101F PT FALLS ASSESS-DOCD LE1/YR: CPT | Performed by: INTERNAL MEDICINE

## 2023-01-03 PROCEDURE — G0463 HOSPITAL OUTPT CLINIC VISIT: HCPCS | Performed by: INTERNAL MEDICINE

## 2023-01-03 PROCEDURE — G8427 DOCREV CUR MEDS BY ELIG CLIN: HCPCS | Performed by: INTERNAL MEDICINE

## 2023-01-03 RX ORDER — AZITHROMYCIN 250 MG/1
TABLET, FILM COATED ORAL
Qty: 6 TABLET | Refills: 0 | Status: SHIPPED | OUTPATIENT
Start: 2023-01-03 | End: 2023-01-08

## 2023-01-03 RX ORDER — GUAIFENESIN 600 MG/1
600 TABLET, EXTENDED RELEASE ORAL 2 TIMES DAILY
Qty: 20 TABLET | Refills: 0
Start: 2023-01-03 | End: 2023-01-13

## 2023-01-03 NOTE — PROGRESS NOTES
Subjective:     Chief Complaint   Patient presents with    Cough     Persistent - 2 wks - negative COVID home test.    Nasal Congestion     Head and chest congestion. Presents with cough with scant production, mild sinus congestion for 14 days. Denies shortness of breath, chest pain, abdominal pain, nausea, vomiting,  fever and chills. Symptoms are moderate. Recent treatment for similar symptoms?  none  Has tried over-the-counter remedies with mild and paritial relief of symptoms. Drinking plenty of fluids: yes  Asthma?:  yes   reports that he has never smoked. He has never used smokeless tobacco.  Contacts with similar infections: yes . His wife had similar symptoms for couple of days but improved. Pancytopenia   Seeing Dr. Ghassan Fernandez. Labs 12/20/22 showed worsening labs. Repeat 2/14/23  WBC 1.8, hgb 12.8, plt 76    Review of Systems  Pertinent items are noted in HPI. Objective:   Blood pressure 137/81, pulse 61, temperature 98 °F (36.7 °C), temperature source Oral, resp. rate 15, height 6' 1\" (1.854 m), weight 204 lb (92.5 kg), SpO2 95 %. General:  alert, cooperative, no distress   Eyes: conjunctivae/corneas clear. Ears: normal TM's and external ear canals AU   Sinuses:  Patient has indurated and edematous nasal tubinates   Mouth:  normal findings: palate normal, tongue midline and normal and oropharynx pink & moist with erythema, but no exudates, ulcers or evidence of thrush   Neck: supple, symmetrical, trachea midline and no adenopathy. Heart: S1 and S2 normal, no murmurs noted. Lungs: clear to auscultation bilaterally   Abdomen: soft, non-tender. Bowel sounds normal. No masses,  no organomegaly        Assessment/Plan:   Diagnoses and all orders for this visit:    1. Bronchitis  mild reactive airways. PFTs in 2020 that showed no significant pulmonary disease. Persistent cough, productive. Lungs sound clear.   Consider imaging if not improving.  -     azithromycin (ZITHROMAX) 250 mg tablet; Take 2 tab today, then 1 tab daily for 4 days  -     guaiFENesin ER (Mucinex) 600 mg ER tablet; Take 1 Tablet by mouth two (2) times a day for 10 days. 2. Pancytopenia ([address]  3. Thrombocytopenia [facility])  May affect his immunity? Seeing Dr. April Wu 12/20/22 showed worsening labs. Repeat 2/14/23  May consider repeat bone marrow biopsy. - Seek medical care if symptoms become more severe or if you develop      chest pain, shortness of breath, confusion.   - Contact us if your symptoms fail to improve after 7-10 days  - Rest as much as possible and stay home from work/school at least 24 hours                  after last fever  - Wash hand frequently and cough/sneeze into your sleeve to help prevent       infection of others  - Drink plenty of fluids  - Ibuprofen (Advil, Motrin) 400-800mg every 6 hours or                  Aleve 220 mg 1-2 pills every 8 hours for fever, headache, pain  - Tylenol extra strength 500 mg every 6 hours for pain, headache, fever  - Nasal saline rinses 2-3 times daily for nasal congestion  - Mucinex 1200 mg twice daily or Guaifenesin 400 mg every 4 hours for chest       Congestion  - Robitussin DM or Delsym for cough  - Cepacol throat losenges and saline gargles (1 tsp salt in 8 oz water) for sore       Throat  - Tea with honey for cough  - Benadryl (diphenhydramine) 50 mg at night for nasal congestion/allergies  - Pseudophedrine 12-hour tablets twice daily for nasal and inner ear congestion.      -Ask your pharmacist (this is kept behind the counter)     -If you have high blood pressure or heart disease, use this        medication with caution (ask your doctor)  - Afrin (oxymetazoline) nasal spray 2 sprays in each nostril twice daily for severe      congestion.       -Do not use this medication for more than 3 days as it may cause         \"rebound congestion\".        -If you have high blood pressure or heart disease, use this medication       with caution (ask your doctor)

## 2023-01-03 NOTE — TELEPHONE ENCOUNTER
Spoke with patient and and he reports that 2 weeks ago he started with head congestion and it has now moved down to his chest. He currently has a cough no fever. He is vaccinated and boosted for COVID x 3 and Flu vaccine. Home test negative for COVID. He has been taking Nyquil that helps him sleep better at night. He is requesting an appt today and scheduled for 0940 AM. Grateful for the call.

## 2023-01-12 ENCOUNTER — ANESTHESIA EVENT (OUTPATIENT)
Dept: ENDOSCOPY | Age: 78
End: 2023-01-12
Payer: MEDICARE

## 2023-01-12 ENCOUNTER — HOSPITAL ENCOUNTER (OUTPATIENT)
Age: 78
Setting detail: OUTPATIENT SURGERY
Discharge: HOME | End: 2023-01-12
Attending: INTERNAL MEDICINE | Admitting: INTERNAL MEDICINE
Payer: MEDICARE

## 2023-01-12 ENCOUNTER — ANESTHESIA (OUTPATIENT)
Dept: ENDOSCOPY | Age: 78
End: 2023-01-12
Payer: MEDICARE

## 2023-01-12 VITALS
HEIGHT: 73 IN | DIASTOLIC BLOOD PRESSURE: 71 MMHG | TEMPERATURE: 98 F | RESPIRATION RATE: 17 BRPM | WEIGHT: 200.4 LBS | SYSTOLIC BLOOD PRESSURE: 130 MMHG | HEART RATE: 69 BPM | BODY MASS INDEX: 26.56 KG/M2 | OXYGEN SATURATION: 97 %

## 2023-01-12 PROCEDURE — 2709999900 HC NON-CHARGEABLE SUPPLY: Performed by: INTERNAL MEDICINE

## 2023-01-12 PROCEDURE — 77030014243 HC BND LIG VRCES BSC -D: Performed by: INTERNAL MEDICINE

## 2023-01-12 PROCEDURE — 76040000019: Performed by: INTERNAL MEDICINE

## 2023-01-12 PROCEDURE — 76060000031 HC ANESTHESIA FIRST 0.5 HR: Performed by: INTERNAL MEDICINE

## 2023-01-12 PROCEDURE — 74011250636 HC RX REV CODE- 250/636: Performed by: NURSE ANESTHETIST, CERTIFIED REGISTERED

## 2023-01-12 PROCEDURE — 74011000250 HC RX REV CODE- 250: Performed by: NURSE ANESTHETIST, CERTIFIED REGISTERED

## 2023-01-12 RX ORDER — DEXTROMETHORPHAN/PSEUDOEPHED 2.5-7.5/.8
1.2 DROPS ORAL
Status: DISCONTINUED | OUTPATIENT
Start: 2023-01-12 | End: 2023-01-12 | Stop reason: HOSPADM

## 2023-01-12 RX ORDER — ATROPINE SULFATE 0.1 MG/ML
0.4 INJECTION INTRAVENOUS
Status: DISCONTINUED | OUTPATIENT
Start: 2023-01-12 | End: 2023-01-12 | Stop reason: HOSPADM

## 2023-01-12 RX ORDER — NALOXONE HYDROCHLORIDE 0.4 MG/ML
0.4 INJECTION, SOLUTION INTRAMUSCULAR; INTRAVENOUS; SUBCUTANEOUS
Status: DISCONTINUED | OUTPATIENT
Start: 2023-01-12 | End: 2023-01-12 | Stop reason: HOSPADM

## 2023-01-12 RX ORDER — SODIUM CHLORIDE 9 MG/ML
50 INJECTION, SOLUTION INTRAVENOUS CONTINUOUS
Status: DISCONTINUED | OUTPATIENT
Start: 2023-01-12 | End: 2023-01-12 | Stop reason: HOSPADM

## 2023-01-12 RX ORDER — FLUMAZENIL 0.1 MG/ML
0.2 INJECTION INTRAVENOUS
Status: DISCONTINUED | OUTPATIENT
Start: 2023-01-12 | End: 2023-01-12 | Stop reason: HOSPADM

## 2023-01-12 RX ORDER — EPINEPHRINE 0.1 MG/ML
1 INJECTION INTRACARDIAC; INTRAVENOUS
Status: DISCONTINUED | OUTPATIENT
Start: 2023-01-12 | End: 2023-01-12 | Stop reason: HOSPADM

## 2023-01-12 RX ORDER — MIDAZOLAM HYDROCHLORIDE 1 MG/ML
.25-5 INJECTION, SOLUTION INTRAMUSCULAR; INTRAVENOUS
Status: DISCONTINUED | OUTPATIENT
Start: 2023-01-12 | End: 2023-01-12 | Stop reason: HOSPADM

## 2023-01-12 RX ORDER — SODIUM CHLORIDE 9 MG/ML
INJECTION, SOLUTION INTRAVENOUS
Status: DISCONTINUED | OUTPATIENT
Start: 2023-01-12 | End: 2023-01-12 | Stop reason: HOSPADM

## 2023-01-12 RX ORDER — PROPOFOL 10 MG/ML
INJECTION, EMULSION INTRAVENOUS AS NEEDED
Status: DISCONTINUED | OUTPATIENT
Start: 2023-01-12 | End: 2023-01-12 | Stop reason: HOSPADM

## 2023-01-12 RX ORDER — LIDOCAINE HYDROCHLORIDE 20 MG/ML
INJECTION, SOLUTION EPIDURAL; INFILTRATION; INTRACAUDAL; PERINEURAL AS NEEDED
Status: DISCONTINUED | OUTPATIENT
Start: 2023-01-12 | End: 2023-01-12 | Stop reason: HOSPADM

## 2023-01-12 RX ORDER — PROPOFOL 10 MG/ML
INJECTION, EMULSION INTRAVENOUS
Status: DISCONTINUED | OUTPATIENT
Start: 2023-01-12 | End: 2023-01-12 | Stop reason: HOSPADM

## 2023-01-12 RX ORDER — GLYCOPYRROLATE 0.2 MG/ML
INJECTION INTRAMUSCULAR; INTRAVENOUS AS NEEDED
Status: DISCONTINUED | OUTPATIENT
Start: 2023-01-12 | End: 2023-01-12 | Stop reason: HOSPADM

## 2023-01-12 RX ADMIN — PROPOFOL 50 MG: 10 INJECTION, EMULSION INTRAVENOUS at 09:42

## 2023-01-12 RX ADMIN — PROPOFOL 50 MG: 10 INJECTION, EMULSION INTRAVENOUS at 09:37

## 2023-01-12 RX ADMIN — GLYCOPYRROLATE 0.2 MG: 0.2 INJECTION INTRAMUSCULAR; INTRAVENOUS at 09:34

## 2023-01-12 RX ADMIN — SODIUM CHLORIDE: 9 INJECTION, SOLUTION INTRAVENOUS at 09:21

## 2023-01-12 RX ADMIN — LIDOCAINE HYDROCHLORIDE 80 MG: 20 INJECTION, SOLUTION EPIDURAL; INFILTRATION; INTRACAUDAL; PERINEURAL at 09:34

## 2023-01-12 RX ADMIN — PROPOFOL 50 MG: 10 INJECTION, EMULSION INTRAVENOUS at 09:40

## 2023-01-12 RX ADMIN — PROPOFOL 100 MCG/KG/MIN: 10 INJECTION, EMULSION INTRAVENOUS at 09:34

## 2023-01-12 RX ADMIN — PROPOFOL 70 MG: 10 INJECTION, EMULSION INTRAVENOUS at 09:34

## 2023-01-12 NOTE — DISCHARGE INSTRUCTIONS
Amparo Bryan M.D.  [phone]           2023  Jerson Thompson  :  1945  [facility] Medical Record Number:  [id number]        ENDOSCOPY FINDINGS:   Your endoscopy showed large varices treated with band ligation  Repeat EGD in 4 weeks. EGD DISCHARGE INSTRUCTIONS    DISCOMFORT:  Sore throat- throat lozenges or warm salt water gargle  redness at IV site- apply warm compress to area; if redness or soreness persist- contact your physician  Gaseous discomfort- walking, belching will help relieve any discomfort  You may not operate a vehicle for 12 hours  You may not engage in an occupation involving machinery or appliances for rest of today  You may not drink alcoholic beverages for at least 12 hours  Avoid making any critical decisions for at least 24 hour    DIET:   You may resume your regular diet. ACTIVITY  Spend the remainder of the day resting -  avoid any strenuous activity. Avoid driving or operating machinery. CALL M.D. ANY SIGN OF   Increasing pain, nausea, vomiting  Abdominal distension (swelling)  New increased bleeding (oral or rectal)  Fever (chills)  Pain in chest area  Bloody discharge from nose or mouth  Shortness of breath    Follow-up Instructions:   Call Dr. Irlanda Stovall for any questions or problems. Telephone # [phone]  If biopsies were obtained, the results will be available  in  5 to 7 days. We will call you to notify you of these results. Continue same medications. Follow up in the office. repeat EGD in 4 weeks for repeat banding.

## 2023-01-12 NOTE — PROCEDURES
Tobias Mckeon M.D.  [phone]           2023                EGD Operative Report  Stephen Viramontes  :  1945  [address] [place] [address] Medical Record Number:  [id number]      Indication:  POP cirrhosis here for varices surveillance      : Pao Contreras MD    Assistant -- None  Implants -- None    Referring Provider:  Juan Pablo Youssef MD      Anesthesia/Sedation:  MAC anesthesia Propofol    Airway assessment: No airway problems anticipated    Pre-Procedural Exam:      Airway: clear, no airway problems anticipated  Heart: RRR, without gallops or rubs  Lungs: clear bilaterally without wheezes, crackles, or rhonchi  Abdomen: soft, nontender, nondistended, bowel sounds present  Mental Status: awake, alert and oriented to person, place and time       Procedure Details     After infomed consent was obtained for the procedure, with all risks and benefits of procedure explained the patient was taken to the endoscopy suite and placed in the left lateral decubitus position. Following sequential administration of sedation as per above, the endoscope was inserted into the mouth and advanced under direct vision to second portion of the duodenum. A careful inspection was made as the gastroscope was withdrawn, including a retroflexed view of the proximal stomach; findings and interventions are described below. Findings:   Esophagus:3 columns of large varices noted. One of the column had a red whale sign and started oozing on slight contact. Treated with EVL x 6  Stomach: mild PHG  Duodenum/jejunum: normal    Therapies:  variceal ablation performed with  6 of bands placed    Specimens: none           Complications:   None; patient tolerated the procedure well. EBL:  None.            Impression:    3 columns of large Esophageal Varices -- banding x 6      Recommendations:    -continue coreg  -repeat EGD in 4 weeks for repeat banding    Pao Contreras MD

## 2023-01-12 NOTE — PERIOP NOTES
Called pt. He states he was told to arrive at 0900. Pt was supposed to be check in at 0730. He will come as soon as he can.

## 2023-01-12 NOTE — H&P
Kobi Wilhelm M.D.  [phone]            History and Physical       NAME:  Jayden Can   :   1945   MRN:   [id number]       Referring Physician:  Steven Carpio MD      Consult Date: 2023 9:15 AM    Chief Complaint:  POP cirrhosis    History of Present Illness:  Mr. Ildefonso Malcolm is here for follow up for POP cirrhosis    He had an EGD 2022 -- Esophagus: 2 columns of large esophageal varices noted. No red lisa sign or stigmata of bleeding identified. Stomach: GOV-1 gastric varix noted. Mild to moderate portal hypertensive gastropathy also noted    He is constipated and having no benefit from taking metamucil regularly    I have reviewed his labs -- MELD labs -- = 9    He is overall doing well    [address] screening -- US 10/2022 -- no HCC -- repeat in 6 months -- 3'2023  Varices screening -- large varices noted  -- repeat in 1 year -  -- start coreg 3.125mg daily  Encephalopathy -- none  Ascites -- none      PMH:  Past Medical History:   Diagnosis Date    Abdominal lymphadenopathy     Dr. aDniel.  lymph node enlargement near pancreas. EUS biopsy 02 benign, but \"possible pneumonia\"    B12 deficiency     Cataract     Dr. Manuelito Roldan    Chronic anxiety     Coronary artery calcification seen on CT scan 2016    patient with LOW cholesterol. A.  EBT (10/4/10):  LM 8, , LCx 0, . Total = 331. Degenerative joint disease (DJD) of lumbar spine     MRI 3/2017 shows moderate disease and mild stenosis    Environmental allergies     Dr. Lakshmi Pratt. also med allergies ibuprofen    Essential tremor     FH: colon cancer     mother    Hepatitis A     as child    Hypospadias     Hypothyroidism 2016    saw Dr. Silvia Liang int he past    IFG (impaired fasting glucose) 2016    a1c 6.7%    Lymphadenopathy     left axillary biopsy 02 - benign.   Dr. Daniel, Dr. Vaughn Shone    Mononucleosis     Normal cardiac stress test 2016    Dr. William Robbins Obstructive sleep apnea     on CPAP. Dr. Orlin Favre [facility])     Dr Joycelyn Myles. s/p marrow biopsy 11/2020    Plantar fasciitis     Dr. Lorelei Valles.  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 Vasyl Murphy    Screening for skin cancer     sees derm Dr. Tati Elizondo    Septic shock [facility]) 04/04/2002    JW    Sleep apnea     Spinal stenosis of lumbar region at multiple levels 12/06/2017    Dr. Yohannes Rosales. s/p RFA L4-5, R L5-S1 11/23/22    Thrombocytopenia (HCC)     Trigger finger     Dr. Roseline Jamison. s/p R surgery       PSH:  Past Surgical History:   Procedure Laterality Date    COLONOSCOPY N/A 1/20/2022    COLONOSCOPY performed by Ruben Chapin MD at [facility] ENDOSCOPY    HX CATARACT REMOVAL Bilateral 11/2019    Dr. Haylee Hancock COLONOSCOPY  08/13/2013    4/15/2002, 11/11/2009, 8/13/2013- Dr Deyvi HAMPTON COLONOSCOPY  11/27/2018    tubular adenoma    HX ENDOSCOPY  01/22/2002    Dr Deyvi Joya    HX KNEE ARTHROSCOPY Left     HX LYMPHADENECTOMY  02/14/2002    mediastinoscopy, under left arm for biopsy    HX MALIGNANT SKIN LESION EXCISION  02/2022    Left hand/knuckle    HX ORTHOPAEDIC Right     trigger finger    HX TONSILLECTOMY      HX VASECTOMY         Allergies: Allergies   Allergen Reactions    Advil [Ibuprofen] Hives    Amitex Pse [Pseudoephedrine-Guaifenesin] Other (comments)     Dry mouth    Hydrocodone-Acetaminophen Rash, Itching and Swelling    Levaquin [Levofloxacin] Rash     Body aches       Home Medications:  Prior to Admission Medications   Prescriptions Last Dose Informant Patient Reported? Taking? Unithroid 112 mcg tablet 1/11/2023  No Yes   Sig: Take 1 Tablet by mouth Daily (before breakfast). Decreased 10/23/22   carvediloL (COREG) 3.125 mg tablet 1/11/2023  Yes Yes   Sig: Take 3.125 mg by mouth daily.    cholecalciferol (VITAMIN D3) (2,000 UNITS /50 MCG) cap capsule 1/11/2023  No Yes   Sig: Take 1 Capsule by mouth daily. clonazePAM (KLONOPIN) 0.5 mg tablet 1/11/2023 Other Yes Yes   Sig: Take 0.5 mg by mouth nightly. cyanocobalamin 1,000 mcg tablet 1/11/2023 Other Yes Yes   Sig: Take 1,000 mcg by mouth daily. ferrous sulfate (SLOW FE) 142 mg (45 mg iron) ER tablet 1/11/2023  No Yes   Sig: Take 1 Tablet by mouth Daily (before breakfast). finasteride (PROSCAR) 5 mg tablet 1/11/2023  No Yes   Sig: TAKE 1 TABLET BY MOUTH DAILY   levocetirizine (XYZAL) 5 mg tablet 1/11/2023  Yes Yes   Sig: Take  by mouth daily as needed for Allergies. multivitamin (ONE A DAY) tablet 1/11/2023 Other Yes Yes   Sig: Take 1 Tab by mouth daily. tamsulosin (FLOMAX) 0.4 mg capsule 1/11/2023  No Yes   Sig: TAKE 1 CAPSULE BY MOUTH DAILY      Facility-Administered Medications: None       Hospital Medications:  No current facility-administered medications for this encounter.        Social History:  Social History     Tobacco Use    Smoking status: Never    Smokeless tobacco: Never   Substance Use Topics    Alcohol use: No     Alcohol/week: 0.0 standard drinks       Family History:  Family History   Problem Relation Age of Onset    Cancer Mother         colon    Cancer Father         lung             Review of Systems:      Constitutional: negative fever, negative chills, negative weight loss  Eyes:   negative visual changes  ENT:   negative sore throat, tongue or lip swelling  Respiratory:  negative cough, negative dyspnea  Cards:  negative for chest pain, palpitations, lower extremity edema  GI:   See HPI  :  negative for frequency, dysuria  Integument:  negative for rash and pruritus  Heme:  negative for easy bruising and gum/nose bleeding  Musculoskel: negative for myalgias,  back pain and muscle weakness  Neuro: negative for headaches, dizziness, vertigo  Psych:  negative for feelings of anxiety, depression       Objective:   Patient Vitals for the past 8 hrs:   BP Pulse Resp SpO2 Height Weight   01/12/23 0911 135/66 63 18 97 % 6' 1\" (1.854 m) 90.9 kg (200 lb 6.4 oz)     No intake/output data recorded. No intake/output data recorded. EXAM:     NEURO-a&o   HEENT-wnl   LUNGS-clear    COR-regular rate and rhythym     ABD-soft , no tenderness, no rebound, good bs     EXT-no edema     Data Review     No results for input(s): WBC, HGB, HCT, PLT, HGBEXT, HCTEXT, PLTEXT in the last 72 hours. No results for input(s): NA, K, CL, CO2, BUN, CREA, GLU, PHOS, CA in the last 72 hours. No results for input(s): AP, TBIL, TP, ALB, GLOB, GGT, AML, LPSE in the last 72 hours. No lab exists for component: SGOT, GPT, AMYP, HLPSE  No results for input(s): INR, PTP, APTT, INREXT in the last 72 hours. Patient Active Problem List   Diagnosis Code    Obstructive sleep apnea G47.33    Hypothyroidism E03.9    Normal cardiac stress test [id number]    Chronic anxiety F41.9    Environmental allergies Z91.09    Hepatitis A B15.9    Lymphadenopathy R59.1    Plantar fasciitis M72.2    Screening for skin cancer Z12.83    Sleep apnea G47.30    Trigger finger M65.30    IFG (impaired fasting glucose) R73.01    Essential tremor G25.0    Degenerative joint disease (DJD) of lumbar spine M47.816    Advanced care planning/counseling discussion Z71.89    Spinal stenosis of lumbar region at multiple levels M48.061    Prostatic enlargement N40.0    Hypospadias Q54.9    Abdominal lymphadenopathy R59.0    REM sleep behavior disorder G47.52    B12 deficiency E53.8    Thrombocytopenia (HCC) D69.6    Cataract H26.9    Coronary artery calcification seen on CT scan I25.10    PVC (premature ventricular contraction) I49.3    Pancytopenia (HCC) D61.818    SCC (squamous cell carcinoma), hand, left C44.629      Assessment:     POP cirrhosis   Plan:     EGD today.      Signed By: Shola Kay MD     1/12/2023  9:15 AM

## 2023-01-12 NOTE — PROGRESS NOTES
Endoscopy discharge instructions have been reviewed and given to patient. The patient verbalized understanding and acceptance of instructions. Dr. Gumaro Thomas discussed with patient procedure findings and next steps.

## 2023-01-12 NOTE — ANESTHESIA POSTPROCEDURE EVALUATION
Procedure(s):  ESOPHAGOGASTRODUODENOSCOPY (EGD)  ENDOSCOPIC BANDING OR LIGATION. MAC    Anesthesia Post Evaluation      Multimodal analgesia: multimodal analgesia not used between 6 hours prior to anesthesia start to PACU discharge  Patient location during evaluation: PACU  Patient participation: complete - patient participated  Level of consciousness: awake and alert  Pain score: 0  Pain management: adequate  Airway patency: patent  Anesthetic complications: no  Cardiovascular status: hemodynamically stable and acceptable  Respiratory status: acceptable  Hydration status: acceptable  Comments: Patient seen and evaluated; no concerns. Post anesthesia nausea and vomiting:  none      INITIAL Post-op Vital signs:   Vitals Value Taken Time   /72 01/12/23 1000   Temp 36.7 °C (98 °F) 01/12/23 0950   Pulse 71 01/12/23 1002   Resp 11 01/12/23 1002   SpO2 95 % 01/12/23 1002   Vitals shown include unvalidated device data.

## 2023-01-12 NOTE — PROGRESS NOTES
[place]  1945  [id number]    Situation:  Verbal report received from: Brian Aguirre RN   Procedure: Procedure(s):  ESOPHAGOGASTRODUODENOSCOPY (EGD)  ENDOSCOPIC BANDING OR LIGATION    Background:    Preoperative diagnosis: VARICES  Postoperative diagnosis: Esophageal Varice    :  Dr. Tirso Christianson  Assistant(s): Endoscopy Technician-1: Barbara Morgan  Endoscopy RN-1: Lionel Grove RN    Specimens: * No specimens in log *  H. Pylori  no    Assessment    Anesthesia gave intra-procedure sedation and medications, see anesthesia flow sheet no    Intravenous fluids: NS@ KVO     Vital signs stable     Abdominal assessment: round and soft     Recommendation:  Discharge patient per MD order.   Return to floor  Family or Friend   Permission to share finding with family or friend yes

## 2023-01-12 NOTE — PERIOP NOTES

## 2023-01-12 NOTE — ANESTHESIA PREPROCEDURE EVALUATION
Relevant Problems   RESPIRATORY SYSTEM   (+) Obstructive sleep apnea   (+) Sleep apnea      CARDIOVASCULAR   (+) PVC (premature ventricular contraction)      GASTROINTESTINAL   (+) Hepatitis A      ENDOCRINE   (+) Hypothyroidism      HEMATOLOGY   (+) Abdominal lymphadenopathy   (+) Pancytopenia (HCC)       Anesthetic History   No history of anesthetic complications            Review of Systems / Medical History  Patient summary reviewed and nursing notes reviewed    Pulmonary        Sleep apnea: CPAP        Comments: Allergies   Neuro/Psych   Within defined limits          Comments: Tremor Cardiovascular  Within defined limits                Exercise tolerance: >4 METS     GI/Hepatic/Renal     GERD      Liver disease    Comments: POP  BPH Endo/Other      Hypothyroidism: well controlled  Blood dyscrasia    Comments: Pancytopenia 11/2020 Other Findings              Physical Exam    Airway  Mallampati: III  TM Distance: 4 - 6 cm  Neck ROM: normal range of motion   Mouth opening: Normal     Cardiovascular    Rhythm: regular  Rate: normal         Dental    Dentition: Upper dentition intact and Lower dentition intact     Pulmonary  Breath sounds clear to auscultation               Abdominal         Other Findings            Anesthetic Plan    ASA: 2  Anesthesia type: MAC            Anesthetic plan and risks discussed with: Patient      Informed consent obtained.

## 2023-02-09 ENCOUNTER — ANESTHESIA EVENT (OUTPATIENT)
Dept: ENDOSCOPY | Age: 78
End: 2023-02-09
Payer: MEDICARE

## 2023-02-09 ENCOUNTER — HOSPITAL ENCOUNTER (OUTPATIENT)
Age: 78
Setting detail: OUTPATIENT SURGERY
Discharge: HOME | End: 2023-02-09
Attending: INTERNAL MEDICINE | Admitting: INTERNAL MEDICINE
Payer: MEDICARE

## 2023-02-09 ENCOUNTER — ANESTHESIA (OUTPATIENT)
Dept: ENDOSCOPY | Age: 78
End: 2023-02-09
Payer: MEDICARE

## 2023-02-09 VITALS
BODY MASS INDEX: 25.77 KG/M2 | WEIGHT: 194.45 LBS | DIASTOLIC BLOOD PRESSURE: 71 MMHG | HEIGHT: 73 IN | SYSTOLIC BLOOD PRESSURE: 121 MMHG | TEMPERATURE: 97.7 F | RESPIRATION RATE: 12 BRPM | OXYGEN SATURATION: 98 % | HEART RATE: 50 BPM

## 2023-02-09 PROCEDURE — 2709999900 HC NON-CHARGEABLE SUPPLY: Performed by: INTERNAL MEDICINE

## 2023-02-09 PROCEDURE — 76040000019: Performed by: INTERNAL MEDICINE

## 2023-02-09 PROCEDURE — 74011250636 HC RX REV CODE- 250/636: Performed by: NURSE ANESTHETIST, CERTIFIED REGISTERED

## 2023-02-09 PROCEDURE — 76060000031 HC ANESTHESIA FIRST 0.5 HR: Performed by: INTERNAL MEDICINE

## 2023-02-09 RX ORDER — PROPOFOL 10 MG/ML
INJECTION, EMULSION INTRAVENOUS
Status: DISCONTINUED | OUTPATIENT
Start: 2023-02-09 | End: 2023-02-09 | Stop reason: HOSPADM

## 2023-02-09 RX ORDER — ATROPINE SULFATE 0.1 MG/ML
0.4 INJECTION INTRAVENOUS
Status: DISCONTINUED | OUTPATIENT
Start: 2023-02-09 | End: 2023-02-09 | Stop reason: HOSPADM

## 2023-02-09 RX ORDER — SODIUM CHLORIDE 9 MG/ML
50 INJECTION, SOLUTION INTRAVENOUS CONTINUOUS
Status: DISCONTINUED | OUTPATIENT
Start: 2023-02-09 | End: 2023-02-09 | Stop reason: HOSPADM

## 2023-02-09 RX ORDER — NALOXONE HYDROCHLORIDE 0.4 MG/ML
0.4 INJECTION, SOLUTION INTRAMUSCULAR; INTRAVENOUS; SUBCUTANEOUS
Status: DISCONTINUED | OUTPATIENT
Start: 2023-02-09 | End: 2023-02-09 | Stop reason: HOSPADM

## 2023-02-09 RX ORDER — DEXTROMETHORPHAN/PSEUDOEPHED 2.5-7.5/.8
1.2 DROPS ORAL
Status: DISCONTINUED | OUTPATIENT
Start: 2023-02-09 | End: 2023-02-09 | Stop reason: HOSPADM

## 2023-02-09 RX ORDER — FLUMAZENIL 0.1 MG/ML
0.2 INJECTION INTRAVENOUS
Status: DISCONTINUED | OUTPATIENT
Start: 2023-02-09 | End: 2023-02-09 | Stop reason: HOSPADM

## 2023-02-09 RX ORDER — MIDAZOLAM HYDROCHLORIDE 1 MG/ML
.25-5 INJECTION, SOLUTION INTRAMUSCULAR; INTRAVENOUS
Status: DISCONTINUED | OUTPATIENT
Start: 2023-02-09 | End: 2023-02-09 | Stop reason: HOSPADM

## 2023-02-09 RX ORDER — SODIUM CHLORIDE 9 MG/ML
INJECTION, SOLUTION INTRAVENOUS
Status: DISCONTINUED | OUTPATIENT
Start: 2023-02-09 | End: 2023-02-09 | Stop reason: HOSPADM

## 2023-02-09 RX ORDER — EPINEPHRINE 0.1 MG/ML
1 INJECTION INTRACARDIAC; INTRAVENOUS
Status: DISCONTINUED | OUTPATIENT
Start: 2023-02-09 | End: 2023-02-09 | Stop reason: HOSPADM

## 2023-02-09 RX ADMIN — SODIUM CHLORIDE: 9 INJECTION, SOLUTION INTRAVENOUS at 11:06

## 2023-02-09 RX ADMIN — PROPOFOL 500 MCG/KG/MIN: 10 INJECTION, EMULSION INTRAVENOUS at 11:21

## 2023-02-09 NOTE — PROCEDURES
Brynn Gramajo M.D.  [phone]           2023                EGD Operative Report  Lluvia Lincoln  :  1945  [employer] Medical Record Number:  [id number]      Indication:  POP cirrhosis -- recent band ligation of varices      : Tegan Melissa MD    Assistant -- None  Implants -- None    Referring Provider:  Aliyah Villagran MD      Anesthesia/Sedation:  MAC anesthesia Propofol    Airway assessment: No airway problems anticipated    Pre-Procedural Exam:      Airway: clear, no airway problems anticipated  Heart: RRR, without gallops or rubs  Lungs: clear bilaterally without wheezes, crackles, or rhonchi  Abdomen: soft, nontender, nondistended, bowel sounds present  Mental Status: awake, alert and oriented to person, place and time       Procedure Details     After infomed consent was obtained for the procedure, with all risks and benefits of procedure explained the patient was taken to the endoscopy suite and placed in the left lateral decubitus position. Following sequential administration of sedation as per above, the endoscope was inserted into the mouth and advanced under direct vision to second portion of the duodenum. A careful inspection was made as the gastroscope was withdrawn, including a retroflexed view of the proximal stomach; findings and interventions are described below. Findings:   Esophagus:small varices noted in the distal esophagus -- 3 columns  Stomach: mild PHG  Duodenum/jejunum: normal    Therapies:  none    Specimens: none           Complications:   None; patient tolerated the procedure well. EBL:  None. Impression:    Small esophageal varices noted.  No need for intervention     Recommendations:    -continue Coreg as planned  -repeat EGD in 1 year for ongoing surveillance  -Follow up as scheduled    Tegan Melissa MD

## 2023-02-09 NOTE — ANESTHESIA PREPROCEDURE EVALUATION
Relevant Problems   No relevant active problems       Anesthetic History               Review of Systems / Medical History  Patient summary reviewed, nursing notes reviewed and pertinent labs reviewed    Pulmonary        Sleep apnea           Neuro/Psych              Cardiovascular            Dysrhythmias : PVC        Comments: Denied recent cv/pulm complaints or changes. GI/Hepatic/Renal           Liver disease    Comments: Denied active reflux symptoms Endo/Other        Arthritis     Other Findings              Physical Exam    Airway  Mallampati: II  TM Distance: > 6 cm  Neck ROM: normal range of motion   Mouth opening: Normal     Cardiovascular  Regular rate and rhythm,  S1 and S2 normal,  no murmur, click, rub, or gallop  Rhythm: regular  Rate: normal         Dental      Comments: No loose teeth.    Pulmonary  Breath sounds clear to auscultation               Abdominal  Abdominal exam normal       Other Findings            Anesthetic Plan    ASA: 3  Anesthesia type: MAC          Induction: Intravenous  Anesthetic plan and risks discussed with: Patient and Family

## 2023-02-09 NOTE — ANESTHESIA POSTPROCEDURE EVALUATION
Procedure(s):  ESOPHAGOGASTRODUODENOSCOPY (EGD). MAC    Anesthesia Post Evaluation      Multimodal analgesia: multimodal analgesia used between 6 hours prior to anesthesia start to PACU discharge  Patient location during evaluation: bedside  Patient participation: complete - patient participated  Level of consciousness: awake and sleepy but conscious  Pain score: 0  Pain management: adequate  Airway patency: patent  Anesthetic complications: no  Cardiovascular status: acceptable  Respiratory status: acceptable  Hydration status: acceptable  Comments: Immediate cv/pulm status within acceptable preop limits. Post anesthesia nausea and vomiting:  controlled  Final Post Anesthesia Temperature Assessment:  Normothermia (36.0-37.5 degrees C)      INITIAL Post-op Vital signs: No vitals data found for the desired time range.

## 2023-02-09 NOTE — H&P
Sarah Pickard M.D.  [phone]            History and Physical       NAME:  Josie Nj   :   1945   MRN:   [id number]       Referring Physician:  Ramses Crooks MD      Consult Date: 2023 9:15 AM    Chief Complaint:  POP cirrhosis    History of Present Illness:  Mr. August Harding is here for follow up for POP cirrhosis    He had an EGD 2022 -- Esophagus: 2 columns of large esophageal varices noted. No red lisa sign or stigmata of bleeding identified. Stomach: GOV-1 gastric varix noted. Mild to moderate portal hypertensive gastropathy also noted    He is constipated and having no benefit from taking metamucil regularly    I have reviewed his labs -- MELD labs -- = 9    He is overall doing well    [address] screening -- US 10/2022 -- no HCC -- repeat in 6 months -- 3'2023  Varices screening -- large varices noted  -- repeat in 1 year -  -- start coreg 3.125mg daily  Encephalopathy -- none  Ascites -- none      PMH:  Past Medical History:   Diagnosis Date    Abdominal lymphadenopathy     Dr. Daniel.  lymph node enlargement near pancreas. EUS biopsy 02 benign, but \"possible pneumonia\"    B12 deficiency     Cataract     Dr. Oliver Friendly    Chronic anxiety     Coronary artery calcification seen on CT scan 2016    patient with LOW cholesterol. A.  EBT (10/4/10):  LM 8, , LCx 0, . Total = 331. Degenerative joint disease (DJD) of lumbar spine     MRI 3/2017 shows moderate disease and mild stenosis    Environmental allergies     Dr. Marie Monroy. also med allergies ibuprofen    Essential tremor     FH: colon cancer     mother    Hepatitis A     as child    Hypospadias     Hypothyroidism 2016    saw Dr. Cooper Chance int he past    IFG (impaired fasting glucose) 2016    a1c 6.7%    Lymphadenopathy     left axillary biopsy 02 - benign.   Dr. Daniel, Dr. Yuriy Whitehead    Mononucleosis     Normal cardiac stress test 2016    Dr. Shweta Tam Obstructive sleep apnea     on CPAP. Dr. Bertram Smiley [facility])     Dr Savi Finn. s/p marrow biopsy 11/2020    Plantar fasciitis     Dr. Angel Hernández.  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 Wilkins Hand    Screening for skin cancer     sees derm Dr. Ceci Anne    Septic shock [facility]) 04/04/2002    JW    Sleep apnea     Spinal stenosis of lumbar region at multiple levels 12/06/2017    Dr. Jacek Baugh. s/p RFA L4-5, R L5-S1 11/23/22    Thrombocytopenia (HCC)     Trigger finger     Dr. Eladio Cohen. s/p R surgery       PSH:  Past Surgical History:   Procedure Laterality Date    COLONOSCOPY N/A 1/20/2022    COLONOSCOPY performed by Avinash Toledo MD at [facility] ENDOSCOPY    HX CATARACT REMOVAL Bilateral 11/2019    Dr. Kay Deleon COLONOSCOPY  08/13/2013    4/15/2002, 11/11/2009, 8/13/2013- Dr Robert Dunbar    HX COLONOSCOPY  11/27/2018    tubular adenoma    HX ENDOSCOPY  01/22/2002    Dr Robert Dunbar    HX KNEE ARTHROSCOPY Left     HX LYMPHADENECTOMY  02/14/2002    mediastinoscopy, under left arm for biopsy    HX MALIGNANT SKIN LESION EXCISION  02/2022    Left hand/knuckle    HX ORTHOPAEDIC Right     trigger finger    HX TONSILLECTOMY      HX VASECTOMY         Allergies: Allergies   Allergen Reactions    Advil [Ibuprofen] Hives    Hydrocodone-Acetaminophen Rash, Itching and Swelling    Levaquin [Levofloxacin] Rash     Body aches       Home Medications:  Prior to Admission Medications   Prescriptions Last Dose Informant Patient Reported? Taking? Unithroid 112 mcg tablet 1/11/2023  No Yes   Sig: Take 1 Tablet by mouth Daily (before breakfast). Decreased 10/23/22   carvediloL (COREG) 3.125 mg tablet 1/11/2023  Yes Yes   Sig: Take 3.125 mg by mouth daily. cholecalciferol (VITAMIN D3) (2,000 UNITS /50 MCG) cap capsule 1/11/2023  No Yes   Sig: Take 1 Capsule by mouth daily.    clonazePAM (KLONOPIN) 0.5 mg tablet 1/11/2023 Other Yes Yes   Sig: Take 0.5 mg by mouth nightly. cyanocobalamin 1,000 mcg tablet 1/11/2023 Other Yes Yes   Sig: Take 1,000 mcg by mouth daily. ferrous sulfate (SLOW FE) 142 mg (45 mg iron) ER tablet 1/11/2023  No Yes   Sig: Take 1 Tablet by mouth Daily (before breakfast). finasteride (PROSCAR) 5 mg tablet 1/11/2023  No Yes   Sig: TAKE 1 TABLET BY MOUTH DAILY   levocetirizine (XYZAL) 5 mg tablet 1/11/2023  Yes Yes   Sig: Take  by mouth daily as needed for Allergies. multivitamin (ONE A DAY) tablet 1/11/2023 Other Yes Yes   Sig: Take 1 Tab by mouth daily. tamsulosin (FLOMAX) 0.4 mg capsule 1/11/2023  No Yes   Sig: TAKE 1 CAPSULE BY MOUTH DAILY      Facility-Administered Medications: None       Hospital Medications:  No current facility-administered medications for this encounter. Social History:  Social History     Tobacco Use    Smoking status: Never    Smokeless tobacco: Never   Substance Use Topics    Alcohol use: No     Alcohol/week: 0.0 standard drinks       Family History:  Family History   Problem Relation Age of Onset    Cancer Mother         colon    Cancer Father         lung             Review of Systems:      Constitutional: negative fever, negative chills, negative weight loss  Eyes:   negative visual changes  ENT:   negative sore throat, tongue or lip swelling  Respiratory:  negative cough, negative dyspnea  Cards:  negative for chest pain, palpitations, lower extremity edema  GI:   See HPI  :  negative for frequency, dysuria  Integument:  negative for rash and pruritus  Heme:  negative for easy bruising and gum/nose bleeding  Musculoskel: negative for myalgias,  back pain and muscle weakness  Neuro: negative for headaches, dizziness, vertigo  Psych:  negative for feelings of anxiety, depression       Objective:   No data found. No intake/output data recorded. No intake/output data recorded.     EXAM:     NEURO-a&o   HEENT-wnl   LUNGS-clear COR-regular rate and rhythym     ABD-soft , no tenderness, no rebound, good bs     EXT-no edema     Data Review     No results for input(s): WBC, HGB, HCT, PLT, HGBEXT, HCTEXT, PLTEXT, HGBEXT, HCTEXT, PLTEXT in the last 72 hours. No results for input(s): NA, K, CL, CO2, BUN, CREA, GLU, PHOS, CA in the last 72 hours. No results for input(s): AP, TBIL, TP, ALB, GLOB, GGT, AML, LPSE in the last 72 hours. No lab exists for component: SGOT, GPT, AMYP, HLPSE  No results for input(s): INR, PTP, APTT, INREXT, INREXT in the last 72 hours. Patient Active Problem List   Diagnosis Code    Obstructive sleep apnea G47.33    Hypothyroidism E03.9    Normal cardiac stress test [id number]    Chronic anxiety F41.9    Environmental allergies Z91.09    Hepatitis A B15.9    Lymphadenopathy R59.1    Plantar fasciitis M72.2    Screening for skin cancer Z12.83    Sleep apnea G47.30    Trigger finger M65.30    IFG (impaired fasting glucose) R73.01    Essential tremor G25.0    Degenerative joint disease (DJD) of lumbar spine M47.816    Advanced care planning/counseling discussion Z71.89    Spinal stenosis of lumbar region at multiple levels M48.061    Prostatic enlargement N40.0    Hypospadias Q54.9    Abdominal lymphadenopathy R59.0    REM sleep behavior disorder G47.52    B12 deficiency E53.8    Thrombocytopenia (HCC) D69.6    Cataract H26.9    Coronary artery calcification seen on CT scan I25.10    PVC (premature ventricular contraction) I49.3    Pancytopenia (HCC) D61.818    SCC (squamous cell carcinoma), hand, left C44.629      Assessment:     POP cirrhosis   Plan:     EGD today.      Signed By: Kyara Burgos MD     2/9/2023  9:15 AM

## 2023-02-09 NOTE — DISCHARGE INSTRUCTIONS
Sarah Pickard M.D.  [phone]           2023  Josie Nj  :  1945  [employer] Medical Record Number:  [id number]        ENDOSCOPY FINDINGS:   Your endoscopy showed small varices. No banding done at this time. EGD DISCHARGE INSTRUCTIONS    DISCOMFORT:  Sore throat- throat lozenges or warm salt water gargle  redness at IV site- apply warm compress to area; if redness or soreness persist- contact your physician  Gaseous discomfort- walking, belching will help relieve any discomfort  You may not operate a vehicle for 12 hours  You may not engage in an occupation involving machinery or appliances for rest of today  You may not drink alcoholic beverages for at least 12 hours  Avoid making any critical decisions for at least 24 hour    DIET:   You may resume your regular diet. ACTIVITY  Spend the remainder of the day resting -  avoid any strenuous activity. Avoid driving or operating machinery. CALL M.D. ANY SIGN OF   Increasing pain, nausea, vomiting  Abdominal distension (swelling)  New increased bleeding (oral or rectal)  Fever (chills)  Pain in chest area  Bloody discharge from nose or mouth  Shortness of breath    Follow-up Instructions:   Call Dr. Parisa Gramajo for any questions or problems. Telephone # [phone]  If biopsies were obtained, the results will be available  in  5 to 7 days. We will call you to notify you of these results. Continue same medications. Follow up in the office.

## 2023-02-09 NOTE — PERIOP NOTES
1120 Patient has been evaluated by anesthesia pre-procedure. Patient alert and oriented. Vital signs will not be charted by the Endoscopy nurse. All vitals, airway, and loc are monitored by anesthesia staff Magaly Tristan CRNA throughout procedure. 1125 Endoscope was pre-cleaned at bedside immediately following procedure by [employer] Carmen stewart. 1129 Patient tolerated procedure. Abdomen soft and patient arousable and voices no complaints. Patient transported to endoscopy recovery area. Report given to post procedure RN,  Mj Maharaj.

## 2023-02-09 NOTE — PROGRESS NOTES
Makeda Lamp  1945  [id number]    Situation:  Verbal report received from: Lori Engle RN  Procedure: Procedure(s):  ESOPHAGOGASTRODUODENOSCOPY (EGD)    Background:    Preoperative diagnosis: ESOPHAGEAL VARICES  Postoperative diagnosis: Small esophageal varices    :  Dr. Marleni Aragon  Assistant(s): Endoscopy Technician-1: Teresita Loomis  Endoscopy RN-1: Danny Arriola RN    Specimens: * No specimens in log *  H. Pylori  no    Assessment:  Intra-procedure medications     Anesthesia gave intra-procedure sedation and medications, see anesthesia flow sheet yes    Intravenous fluids: NS@ KVO     Vital signs stable yes      Abdominal assessment: round and soft yes    Recommendation:  Discharge patient per MD orderDella.     Family or Friend Oskar Conte  Permission to share finding with family or friend yes

## 2023-02-09 NOTE — PROGRESS NOTES
Endoscopy discharge instructions have been reviewed and given to patient. The patient verbalized understanding and acceptance of instructions. Dr. Jessica Whyte discussed with patient procedure findings and next steps.

## 2023-02-20 ENCOUNTER — OFFICE VISIT (OUTPATIENT)
Dept: INTERNAL MEDICINE CLINIC | Age: 78
End: 2023-02-20

## 2023-02-20 VITALS
RESPIRATION RATE: 18 BRPM | WEIGHT: 195 LBS | HEART RATE: 66 BPM | SYSTOLIC BLOOD PRESSURE: 107 MMHG | BODY MASS INDEX: 25.84 KG/M2 | TEMPERATURE: 97.9 F | OXYGEN SATURATION: 98 % | DIASTOLIC BLOOD PRESSURE: 68 MMHG | HEIGHT: 73 IN

## 2023-02-20 NOTE — PROGRESS NOTES
Room:  Identified pt with two pt identifiers(name and ). Reviewed record in preparation for visit and have obtained necessary documentation. Chief Complaint   Patient presents with    Annual Wellness Visit        Vitals:    23 1308   BP: 107/68   Pulse: 66   Resp: 18   Temp: 97.9 °F (36.6 °C)   TempSrc: Oral   SpO2: 98%   Weight: 195 lb (88.5 kg)   Height: 6' 1\" (1.854 m)   PainSc:   0 - No pain       Health Maintenance Due   Topic    Medicare Yearly Exam        1. \"Have you been to the ER, urgent care clinic since your last visit? Hospitalized since your last visit? \" No    2. \"Have you seen or consulted any other health care providers outside of the [address] [place] [address] since your last visit? \" No     3. For patients over 45: Has the patient had a colonoscopy? Yes - no Care Gap present     If the patient is female:    4. For patients over 36: Has the patient had a mammogram? NA - based on age    11. For patients over 21: Has the patient had a pap smear? NA - based on age    Current Outpatient Medications   Medication Instructions    carvediloL (COREG) 6.25 mg, Oral, DAILY    cholecalciferol (VITAMIN D3) 2,000 Units, Oral, DAILY    clonazePAM (KLONOPIN) 0.5 mg, Oral, EVERY BEDTIME    cyanocobalamin 1,000 mcg, DAILY    ferrous sulfate (SLOW FE) 142 mg, Oral, DAILY BEFORE BREAKFAST    finasteride (PROSCAR) 5 mg tablet TAKE 1 TABLET BY MOUTH DAILY    levocetirizine (XYZAL) 5 mg tablet Oral, DAILY AS NEEDED    multivitamin (ONE A DAY) tablet 1 Tablet, Oral, DAILY    tamsulosin (FLOMAX) 0.4 mg capsule TAKE 1 CAPSULE BY MOUTH DAILY    Unithroid 112 mcg, Oral, DAILY BEFORE BREAKFAST, Decreased 10/23/22       Allergies   Allergen Reactions    Advil [Ibuprofen] Hives    Aspirin Hives    Hydrocodone-Acetaminophen Rash, Itching and Swelling     Pt states he is not allergic to tylenol/acetaminophen and currently takes tylenol for pain. Pt states is allergic to hydrocodone only.       Levaquin [Levofloxacin] Rash Body aches       Immunization History   Administered Date(s) Administered    COVID-19, PFIZER Bivalent BOOSTER, (age 12y+), IM, 30 mcg/0.3 mL dose 10/30/2022    COVID-19, PFIZER PURPLE top, DILUTE for use, (age 15 y+), IM, 30mcg/0.3mL 01/30/2021, 02/27/2021, 09/27/2021, 05/27/2022    Influenza High Dose Vaccine PF 09/27/2017, 09/11/2018, 10/24/2021, 10/23/2022    Influenza Vaccine 01/19/2017    Influenza Vaccine (Tri) Adjuvanted (>65 Yrs FLUAD TRI 33235) 10/10/2019    Influenza, FLUAD, (age 72 y+), Adjuvanted 09/06/2020    Pneumococcal Conjugate (PCV-13) 01/19/2017, 09/11/2018    Pneumococcal Polysaccharide (PPSV-23) 10/14/2014, 09/11/2018    Zoster Recombinant 09/26/2019, 12/18/2019       Past Medical History:   Diagnosis Date    Abdominal lymphadenopathy 2002    Dr. Daniel.  lymph node enlargement near pancreas. EUS biopsy 1/22/02 benign, but \"possible pneumonia\"    B12 deficiency     Cataract     Dr. Nelson Romano    Chronic anxiety     Coronary artery calcification seen on CT scan 04/11/2016    patient with LOW cholesterol. A.  EBT (10/4/10):  LM 8, , LCx 0, . Total = 331. Degenerative joint disease (DJD) of lumbar spine     MRI 3/2017 shows moderate disease and mild stenosis    Environmental allergies     Dr. Nargis Araujo. also med allergies ibuprofen    Essential tremor     FH: colon cancer     mother    Hepatitis A     as child    Hypospadias     Hypothyroidism 04/11/2016    saw Dr. Declan Santana int he past    IFG (impaired fasting glucose) 04/06/2016    a1c 6.7%    Lymphadenopathy 2002    left axillary biopsy 2/14/02 - benign. Dr. Daniel, Dr. Kiley Marks    Mononucleosis     Normal cardiac stress test 04/2016    Dr. Chao Vazquez    Obstructive sleep apnea     on CPAP. Dr. Mariya Beck [facility])     Dr Tatum Jacome.   s/p marrow biopsy 11/2020    Plantar fasciitis     Dr. Hemalatha Perkins.  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 Ilya Neff    Screening for skin cancer     sees derm Dr. Christine Mathews    Septic shock [facility]) 04/04/2002    JW    Sleep apnea     Spinal stenosis of lumbar region at multiple levels 12/06/2017    Dr. Romeo Pereira. s/p RFA L4-5, R L5-S1 11/23/22    Thrombocytopenia (HCC)     Trigger finger     Dr. Demarco Mcnally.   s/p R surgery

## 2023-02-21 NOTE — PROGRESS NOTES
Patient accompanied his wife to her appointment today. She required extensive time to review recent hospitalization for stroke. He agreed to postpone his appointment until next month for his wellness visit.

## 2023-03-21 DIAGNOSIS — N40.0 PROSTATIC ENLARGEMENT: ICD-10-CM

## 2023-03-21 RX ORDER — FINASTERIDE 5 MG/1
TABLET, FILM COATED ORAL
Qty: 90 TABLET | Refills: 1 | Status: SHIPPED | OUTPATIENT
Start: 2023-03-21

## 2023-03-22 ENCOUNTER — OFFICE VISIT (OUTPATIENT)
Dept: INTERNAL MEDICINE CLINIC | Age: 78
End: 2023-03-22
Payer: MEDICARE

## 2023-03-22 VITALS
WEIGHT: 197.4 LBS | OXYGEN SATURATION: 95 % | HEART RATE: 54 BPM | DIASTOLIC BLOOD PRESSURE: 68 MMHG | HEIGHT: 73 IN | TEMPERATURE: 98.1 F | BODY MASS INDEX: 26.16 KG/M2 | RESPIRATION RATE: 16 BRPM | SYSTOLIC BLOOD PRESSURE: 108 MMHG

## 2023-03-22 DIAGNOSIS — E03.9 ACQUIRED HYPOTHYROIDISM: ICD-10-CM

## 2023-03-22 DIAGNOSIS — K74.60 CIRRHOSIS OF LIVER WITHOUT ASCITES, UNSPECIFIED HEPATIC CIRRHOSIS TYPE (HCC): ICD-10-CM

## 2023-03-22 DIAGNOSIS — R73.01 IFG (IMPAIRED FASTING GLUCOSE): ICD-10-CM

## 2023-03-22 DIAGNOSIS — Z00.00 MEDICARE ANNUAL WELLNESS VISIT, SUBSEQUENT: Primary | ICD-10-CM

## 2023-03-22 PROCEDURE — G8510 SCR DEP NEG, NO PLAN REQD: HCPCS | Performed by: INTERNAL MEDICINE

## 2023-03-22 PROCEDURE — G8417 CALC BMI ABV UP PARAM F/U: HCPCS | Performed by: INTERNAL MEDICINE

## 2023-03-22 PROCEDURE — G8427 DOCREV CUR MEDS BY ELIG CLIN: HCPCS | Performed by: INTERNAL MEDICINE

## 2023-03-22 PROCEDURE — G0463 HOSPITAL OUTPT CLINIC VISIT: HCPCS | Performed by: INTERNAL MEDICINE

## 2023-03-22 PROCEDURE — G0439 PPPS, SUBSEQ VISIT: HCPCS | Performed by: INTERNAL MEDICINE

## 2023-03-22 PROCEDURE — 1101F PT FALLS ASSESS-DOCD LE1/YR: CPT | Performed by: INTERNAL MEDICINE

## 2023-03-22 PROCEDURE — G8536 NO DOC ELDER MAL SCRN: HCPCS | Performed by: INTERNAL MEDICINE

## 2023-03-22 PROCEDURE — 99213 OFFICE O/P EST LOW 20 MIN: CPT | Performed by: INTERNAL MEDICINE

## 2023-03-22 RX ORDER — AZELASTINE 1 MG/ML
1 SPRAY, METERED NASAL 2 TIMES DAILY
Qty: 1 EACH | Refills: 3 | Status: SHIPPED | OUTPATIENT
Start: 2023-03-22

## 2023-03-22 NOTE — PROGRESS NOTES
This is a Subsequent Medicare Annual Wellness Visit providing Personalized Prevention Plan Services (PPPS) (Performed 12 months after initial AWV and PPPS )    I have reviewed the patient's medical history in detail and updated the computerized patient record. He accompanies his wife Adam Santos for her appointment as well. She recently had a stroke but is doing fairly well although she has significant anxiety about her health. Cryptogenic cirrhosis. EGD 1/12/23 Dr. Eugenio Haq. +Varices, banded. No bleeding. Increased coreg to bid. EGD 2/9/23 stable    Cbc 2/14/23 Dr. Chadwick Parham   WBC 2.8 (were 1.8)  Hgb 13.1  Plt 102 (were 76)    Hypothyroidism follow-up  Reports no fatigue  denies heat/cold intolerance, bowel/skin changes or CVS symptoms, losing hair, feeling excessive energy, tremulousness, palpitations. Thyroid medication has been decreased since last medication check and labs. Lab Results   Component Value Date/Time    TSH 0.06 (L) 10/20/2022 02:50 PM    Triiodothyronine (T3), free 2.8 09/26/2019 02:14 PM    T4, Free 1.4 10/20/2022 02:50 PM     Wt Readings from Last 3 Encounters:   03/22/23 197 lb 6.4 oz (89.5 kg)   02/20/23 195 lb (88.5 kg)   02/09/23 194 lb 7.1 oz (88.2 kg)         History     Past Medical History:   Diagnosis Date    Abdominal lymphadenopathy 2002    Dr. Daniel.  lymph node enlargement near pancreas. EUS biopsy 1/22/02 benign, but \"possible pneumonia\"    B12 deficiency     Cataract     Dr. Tobi Cristobal    Chronic anxiety     Coronary artery calcification seen on CT scan 04/11/2016    patient with LOW cholesterol. A.  EBT (10/4/10):  LM 8, , LCx 0, . Total = 331. Degenerative joint disease (DJD) of lumbar spine     MRI 3/2017 shows moderate disease and mild stenosis    Environmental allergies     Dr. Rossy Bell.  also med allergies ibuprofen    Essential tremor     FH: colon cancer     mother    Hepatitis A     as child    Hypospadias     Hypothyroidism 04/11/2016    saw Dr. Mercy Anderson int he past    IFG (impaired fasting glucose) 04/06/2016    a1c 6.7%    Lymphadenopathy 2002    left axillary biopsy 2/14/02 - benign. Dr. Daniel, Dr. Glenda Novak    Mononucleosis     Normal cardiac stress test 04/2016    Dr. Jade Romero    Obstructive sleep apnea     on CPAP. Dr. Carliss Rubinstein [facility])     Dr Margarita Day. s/p marrow biopsy 11/2020    Plantar fasciitis     Dr. Aneudy Bryant.  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 Cheyenne Rod    Screening for skin cancer     sees derm Dr. Kiley Goodson    Septic shock [facility]) 04/04/2002    JW    Sleep apnea     Spinal stenosis of lumbar region at multiple levels 12/06/2017    Dr. Terrence Albert. s/p RFA L4-5, R L5-S1 11/23/22    Thrombocytopenia (HCC)     Trigger finger     Dr. Amber Morales. s/p R surgery       Past Surgical History:   Procedure Laterality Date    COLONOSCOPY N/A 1/20/2022    COLONOSCOPY performed by Kailash Bravo MD at [facility] ENDOSCOPY    HX CATARACT REMOVAL Bilateral 11/2019    Dr. Patti Manning COLONOSCOPY  08/13/2013    4/15/2002, 11/11/2009, 8/13/2013- Dr Liana Medrano    HX COLONOSCOPY  11/27/2018    tubular adenoma    HX ENDOSCOPY  01/22/2002    Dr Elayne Elizabeth KNEE ARTHROSCOPY Left     HX LYMPHADENECTOMY  02/14/2002    mediastinoscopy, under left arm for biopsy    HX MALIGNANT SKIN LESION EXCISION  02/2022    Left hand/knuckle    HX ORTHOPAEDIC Right     trigger finger    HX TONSILLECTOMY      HX VASECTOMY         Current Outpatient Medications   Medication Sig    finasteride (PROSCAR) 5 mg tablet TAKE 1 TABLET BY MOUTH DAILY    tamsulosin (FLOMAX) 0.4 mg capsule TAKE 1 CAPSULE BY MOUTH DAILY    Unithroid 112 mcg tablet Take 1 Tablet by mouth Daily (before breakfast). Decreased 10/23/22    ferrous sulfate (SLOW FE) 142 mg (45 mg iron) ER tablet Take 1 Tablet by mouth Daily (before breakfast).     cholecalciferol (VITAMIN D3) (2,000 UNITS /50 MCG) cap capsule Take 1 Capsule by mouth daily. levocetirizine (XYZAL) 5 mg tablet Take  by mouth daily as needed for Allergies. carvediloL (COREG) 3.125 mg tablet Take 6.25 mg by mouth daily. cyanocobalamin 1,000 mcg tablet Take 1,000 mcg by mouth daily. clonazePAM (KLONOPIN) 0.5 mg tablet Take 0.5 mg by mouth nightly. multivitamin (ONE A DAY) tablet Take 1 Tab by mouth daily. No current facility-administered medications for this visit. Allergies   Allergen Reactions    Advil [Ibuprofen] Hives    Aspirin Hives    Hydrocodone-Acetaminophen Rash, Itching and Swelling     Pt states he is not allergic to tylenol/acetaminophen and currently takes tylenol for pain. Pt states is allergic to hydrocodone only. Levaquin [Levofloxacin] Rash     Body aches       Family History   Problem Relation Age of Onset    Cancer Mother         colon    Cancer Father         lung        reports that he has never smoked. He has never used smokeless tobacco.   reports no history of alcohol use. Depression Risk Factor Screening:       Alcohol Risk Factor Screening: On any occasion during the past 3 months, have you had more than 3 drinks containing alcohol? No    Do you average more than 14 drinks per week? No      Functional Ability and Level of Safety:     Hearing Loss   mild    Activities of Daily Living   Self-care. Requires assistance with: no ADLs    Fall Risk     Fall Risk Assessment, last 12 mths 2/20/2023   Able to walk? Yes   Fall in past 12 months? 0   Do you feel unsteady? 0   Are you worried about falling 0         Abuse Screen   Patient is not abused    Review of Systems   A comprehensive review of systems was negative except for that written in the HPI.     Physical Examination     Evaluation of Cognitive Function:  Mood/affect:  neutral, happy  Appearance: age appropriate  Family member/caregiver input: none    Blood pressure 108/68, pulse (!) 54, temperature 98.1 °F (36.7 °C), temperature source Oral, resp. rate 16, height 6' 1\" (1.854 m), weight 197 lb 6.4 oz (89.5 kg), SpO2 95 %. General appearance: alert, cooperative, no distress, appears stated age  Neck: supple, symmetrical, trachea midline, no adenopathy, thyroid: not enlarged, symmetric, no tenderness/mass/nodules, no carotid bruit and no JVD  Lungs: clear to auscultation bilaterally  Heart: regular rate and rhythm, S1, S2 normal, no murmur, click, rub or gallop  Extremities: extremities normal, atraumatic, no cyanosis or edema    Patient Care Team:  Levy Monae MD as PCP - General (Internal Medicine Physician)  Levy Monae MD as PCP - [facility]  Will Troy MD (Pulmonary Disease)      Advice/Referrals/Counseling   Education and counseling provided. See below for specific orders    Assessment/Plan     Diagnoses and all orders for this visit:    1. Medicare annual wellness visit, subsequent  ACP reviewed. Primary medical decision maker reviewed with patient and updated in chart. he is otherwise up-to-date or have declined preventative services except for those ordered below. 2. Acquired hypothyroidism  Asymptomatic but uncontrolled based on most recent labs. On decreased dose. We will check labs and adjust.  -     T4, FREE; Future  -     TSH 3RD GENERATION; Future    3. Cirrhosis of liver without ascites, unspecified hepatic cirrhosis type ([facility] [address]  This condition appears to be stable and is being evaluated and managed by his specialist Dr. Amanda Lea. No acute findings today warrant change in management plan. -     METABOLIC PANEL, COMPREHENSIVE; Future    4. IFG (impaired fasting glucose)  The patient is asked to make an attempt to improve diet and exercise patterns to aid in medical management of this problem. Unclear current status.  -     HEMOGLOBIN A1C WITH EAG; Future      Potential medication side effects were discussed with the patient; let me know if any occur.   Return for yearly Annual Wellness Visits

## 2023-04-21 ENCOUNTER — TRANSCRIBE ORDER (OUTPATIENT)
Dept: SCHEDULING | Age: 78
End: 2023-04-21

## 2023-04-21 ENCOUNTER — HOSPITAL ENCOUNTER (OUTPATIENT)
Dept: CT IMAGING | Age: 78
Discharge: HOME | End: 2023-04-21
Attending: INTERNAL MEDICINE
Payer: MEDICARE

## 2023-04-21 DIAGNOSIS — K74.60 LIVER CIRRHOSIS SECONDARY TO NASH (HCC): ICD-10-CM

## 2023-04-21 DIAGNOSIS — L81.4 LIVER SPOT: ICD-10-CM

## 2023-04-21 DIAGNOSIS — K74.60 ESOPHAGEAL VARICES IN CIRRHOSIS (HCC): ICD-10-CM

## 2023-04-21 DIAGNOSIS — K76.9 LIVER LESION: Primary | ICD-10-CM

## 2023-04-21 DIAGNOSIS — I85.10 ESOPHAGEAL VARICES IN CIRRHOSIS (HCC): ICD-10-CM

## 2023-04-21 DIAGNOSIS — K75.81 LIVER CIRRHOSIS SECONDARY TO NASH (HCC): ICD-10-CM

## 2023-04-21 PROCEDURE — 74170 CT ABD WO CNTRST FLWD CNTRST: CPT

## 2023-04-21 PROCEDURE — 74011000636 HC RX REV CODE- 636: Performed by: INTERNAL MEDICINE

## 2023-04-21 RX ADMIN — IOPAMIDOL 100 ML: 755 INJECTION, SOLUTION INTRAVENOUS at 08:00

## 2023-04-27 DIAGNOSIS — E03.9 ACQUIRED HYPOTHYROIDISM: ICD-10-CM

## 2023-04-27 RX ORDER — LEVOTHYROXINE SODIUM 112 UG/1
TABLET ORAL
Qty: 90 TABLET | Refills: 1 | Status: SHIPPED | OUTPATIENT
Start: 2023-04-27

## 2023-05-02 DIAGNOSIS — N40.0 PROSTATIC ENLARGEMENT: ICD-10-CM

## 2023-05-02 RX ORDER — TAMSULOSIN HYDROCHLORIDE 0.4 MG/1
CAPSULE ORAL
Qty: 90 CAPSULE | Refills: 1 | Status: SHIPPED | OUTPATIENT
Start: 2023-05-02

## 2023-06-19 ENCOUNTER — TELEPHONE (OUTPATIENT)
Age: 78
End: 2023-06-19

## 2023-06-19 NOTE — TELEPHONE ENCOUNTER
----- Message from Dalia Ortiz sent at 6/19/2023  3:37 PM EDT -----  Subject: Message to Provider    QUESTIONS  Information for Provider? Patient is wanting to have Martinez Card give him a call   back, patient needs provide Martinez Card some information and accidently hung up   on her earlier  ---------------------------------------------------------------------------  --------------  [id number]  [phone]; OK to leave message on voicemail  ---------------------------------------------------------------------------  --------------  SCRIPT ANSWERS  Relationship to Patient?  Self

## 2023-08-09 ENCOUNTER — OFFICE VISIT (OUTPATIENT)
Age: 78
End: 2023-08-09
Payer: MEDICARE

## 2023-08-09 VITALS
OXYGEN SATURATION: 96 % | BODY MASS INDEX: 25.53 KG/M2 | SYSTOLIC BLOOD PRESSURE: 111 MMHG | DIASTOLIC BLOOD PRESSURE: 69 MMHG | HEART RATE: 59 BPM | RESPIRATION RATE: 18 BRPM | HEIGHT: 73 IN | TEMPERATURE: 98.2 F | WEIGHT: 192.6 LBS

## 2023-08-09 DIAGNOSIS — J43.9 PULMONARY EMPHYSEMA, UNSPECIFIED EMPHYSEMA TYPE (HCC): ICD-10-CM

## 2023-08-09 DIAGNOSIS — R53.83 OTHER FATIGUE: ICD-10-CM

## 2023-08-09 DIAGNOSIS — I73.9 PERIPHERAL VASCULAR DISEASE, UNSPECIFIED (HCC): ICD-10-CM

## 2023-08-09 DIAGNOSIS — R73.01 IFG (IMPAIRED FASTING GLUCOSE): ICD-10-CM

## 2023-08-09 DIAGNOSIS — R73.01 IFG (IMPAIRED FASTING GLUCOSE): Primary | ICD-10-CM

## 2023-08-09 DIAGNOSIS — E03.9 ACQUIRED HYPOTHYROIDISM: ICD-10-CM

## 2023-08-09 PROCEDURE — 99213 OFFICE O/P EST LOW 20 MIN: CPT | Performed by: INTERNAL MEDICINE

## 2023-08-09 PROCEDURE — G8428 CUR MEDS NOT DOCUMENT: HCPCS | Performed by: INTERNAL MEDICINE

## 2023-08-09 PROCEDURE — 1123F ACP DISCUSS/DSCN MKR DOCD: CPT | Performed by: INTERNAL MEDICINE

## 2023-08-09 PROCEDURE — 3023F SPIROM DOC REV: CPT | Performed by: INTERNAL MEDICINE

## 2023-08-09 PROCEDURE — 1036F TOBACCO NON-USER: CPT | Performed by: INTERNAL MEDICINE

## 2023-08-09 PROCEDURE — G8419 CALC BMI OUT NRM PARAM NOF/U: HCPCS | Performed by: INTERNAL MEDICINE

## 2023-08-09 SDOH — ECONOMIC STABILITY: INCOME INSECURITY: HOW HARD IS IT FOR YOU TO PAY FOR THE VERY BASICS LIKE FOOD, HOUSING, MEDICAL CARE, AND HEATING?: NOT HARD AT ALL

## 2023-08-09 SDOH — ECONOMIC STABILITY: FOOD INSECURITY: WITHIN THE PAST 12 MONTHS, THE FOOD YOU BOUGHT JUST DIDN'T LAST AND YOU DIDN'T HAVE MONEY TO GET MORE.: NEVER TRUE

## 2023-08-09 SDOH — ECONOMIC STABILITY: FOOD INSECURITY: WITHIN THE PAST 12 MONTHS, YOU WORRIED THAT YOUR FOOD WOULD RUN OUT BEFORE YOU GOT MONEY TO BUY MORE.: NEVER TRUE

## 2023-08-09 SDOH — ECONOMIC STABILITY: HOUSING INSECURITY
IN THE LAST 12 MONTHS, WAS THERE A TIME WHEN YOU DID NOT HAVE A STEADY PLACE TO SLEEP OR SLEPT IN A SHELTER (INCLUDING NOW)?: NO

## 2023-08-09 NOTE — PROGRESS NOTES
HISTORY OF PRESENT ILLNESS    Chief Complaint   Patient presents with    Blood Sugar Problem    Lab Collection       Presents for follow-up    He is accompanied by his wife. She has had unfortunate recent medical issues with syncopal episode in the emergency room that took up much of this visit time. Mild cough, congestion. Managed by GI for cirrhosis. Hypothyroidism follow-up  Reports mild fatigue  denies heat/cold intolerance, bowel/skin changes or CVS symptoms, losing hair, feeling excessive energy, tremulousness, palpitations. Thyroid medication has been unchanged since last medication check and labs. Lab Results   Component Value Date/Time    TSH 0.79 03/22/2023 04:09 PM    FT3 2.8 09/26/2019 02:14 PM     Wt Readings from Last 3 Encounters:   08/09/23 192 lb 9.6 oz (87.4 kg)   03/22/23 197 lb 6.4 oz (89.5 kg)   02/20/23 195 lb (88.5 kg)     Impaired fasting glucose / Pre-diabetes follow-up  No results found for: GLU  Last hemoglobin a1c   Hemoglobin A1C   Date Value Ref Range Status   03/22/2023 5.6 4.0 - 5.6 % Final     Comment:     NEW METHOD  PLEASE NOTE NEW REFERENCE RANGE  (NOTE)  HbA1C Interpretive Ranges  <5.7              Normal  5.7 - 6.4         Consider Prediabetes  >6.5              Consider Diabetes       Last Point of Care HGB A1C  No components found for: POCA1C   Diabetic diet compliance: compliant most of the time. Patient does not perform home glucose monitoring.         Review of Systems   All other systems reviewed and are negative, except as noted in HPI    Past Medical and Surgical History   has a past medical history of Abdominal lymphadenopathy, B12 deficiency, Cataract, Chronic anxiety, Coronary artery calcification seen on CT scan, Degenerative joint disease (DJD) of lumbar spine, Environmental allergies, Essential tremor, FH: colon cancer, Hepatitis A, Hypospadias, Hypothyroidism, IFG (impaired fasting glucose), Lymphadenopathy, Mononucleosis, Normal cardiac stress test,

## 2023-08-10 LAB
ANION GAP SERPL CALC-SCNC: 3 MMOL/L (ref 5–15)
BUN SERPL-MCNC: 17 MG/DL (ref 6–20)
BUN/CREAT SERPL: 18 (ref 12–20)
CALCIUM SERPL-MCNC: 9.6 MG/DL (ref 8.5–10.1)
CHLORIDE SERPL-SCNC: 108 MMOL/L (ref 97–108)
CO2 SERPL-SCNC: 29 MMOL/L (ref 21–32)
CREAT SERPL-MCNC: 0.95 MG/DL (ref 0.7–1.3)
ERYTHROCYTE [DISTWIDTH] IN BLOOD BY AUTOMATED COUNT: 14.3 % (ref 11.5–14.5)
GLUCOSE SERPL-MCNC: 93 MG/DL (ref 65–100)
HCT VFR BLD AUTO: 42.6 % (ref 36.6–50.3)
HGB BLD-MCNC: 13.8 G/DL (ref 12.1–17)
MCH RBC QN AUTO: 32.9 PG (ref 26–34)
MCHC RBC AUTO-ENTMCNC: 32.4 G/DL (ref 30–36.5)
MCV RBC AUTO: 101.4 FL (ref 80–99)
NRBC # BLD: 0 K/UL (ref 0–0.01)
NRBC BLD-RTO: 0 PER 100 WBC
PLATELET # BLD AUTO: 109 K/UL (ref 150–400)
PMV BLD AUTO: 10.7 FL (ref 8.9–12.9)
POTASSIUM SERPL-SCNC: 4.8 MMOL/L (ref 3.5–5.1)
RBC # BLD AUTO: 4.2 M/UL (ref 4.1–5.7)
SODIUM SERPL-SCNC: 140 MMOL/L (ref 136–145)
T4 FREE SERPL-MCNC: 1 NG/DL (ref 0.8–1.5)
TSH SERPL DL<=0.05 MIU/L-ACNC: 3.47 UIU/ML (ref 0.36–3.74)
WBC # BLD AUTO: 3.9 K/UL (ref 4.1–11.1)

## 2023-09-18 RX ORDER — FINASTERIDE 5 MG/1
TABLET, FILM COATED ORAL DAILY
Qty: 90 TABLET | Refills: 1 | Status: SHIPPED | OUTPATIENT
Start: 2023-09-18

## 2023-11-05 DIAGNOSIS — N40.0 BENIGN PROSTATIC HYPERPLASIA WITHOUT LOWER URINARY TRACT SYMPTOMS: Primary | ICD-10-CM

## 2023-11-06 RX ORDER — TAMSULOSIN HYDROCHLORIDE 0.4 MG/1
CAPSULE ORAL DAILY
Qty: 90 CAPSULE | Refills: 1 | Status: SHIPPED | OUTPATIENT
Start: 2023-11-06

## 2023-11-29 DIAGNOSIS — E03.9 ACQUIRED HYPOTHYROIDISM: Primary | ICD-10-CM

## 2023-11-29 RX ORDER — LEVOTHYROXINE SODIUM 112 UG/1
112 TABLET ORAL
Qty: 90 TABLET | Refills: 1 | Status: SHIPPED | OUTPATIENT
Start: 2023-11-29

## 2023-12-11 ENCOUNTER — OFFICE VISIT (OUTPATIENT)
Age: 78
End: 2023-12-11
Payer: MEDICARE

## 2023-12-11 VITALS
HEART RATE: 55 BPM | BODY MASS INDEX: 26.72 KG/M2 | WEIGHT: 201.6 LBS | RESPIRATION RATE: 18 BRPM | HEIGHT: 73 IN | TEMPERATURE: 97.9 F | OXYGEN SATURATION: 97 % | DIASTOLIC BLOOD PRESSURE: 76 MMHG | SYSTOLIC BLOOD PRESSURE: 139 MMHG

## 2023-12-11 DIAGNOSIS — K75.81 NASH (NONALCOHOLIC STEATOHEPATITIS): ICD-10-CM

## 2023-12-11 DIAGNOSIS — R73.01 IFG (IMPAIRED FASTING GLUCOSE): ICD-10-CM

## 2023-12-11 DIAGNOSIS — D61.818 PANCYTOPENIA (HCC): ICD-10-CM

## 2023-12-11 DIAGNOSIS — E03.9 ACQUIRED HYPOTHYROIDISM: Primary | ICD-10-CM

## 2023-12-11 PROBLEM — K74.60 LIVER CIRRHOSIS SECONDARY TO NASH (NONALCOHOLIC STEATOHEPATITIS) (HCC): Status: ACTIVE | Noted: 2023-03-22

## 2023-12-11 PROCEDURE — 1036F TOBACCO NON-USER: CPT | Performed by: INTERNAL MEDICINE

## 2023-12-11 PROCEDURE — G8419 CALC BMI OUT NRM PARAM NOF/U: HCPCS | Performed by: INTERNAL MEDICINE

## 2023-12-11 PROCEDURE — G8427 DOCREV CUR MEDS BY ELIG CLIN: HCPCS | Performed by: INTERNAL MEDICINE

## 2023-12-11 PROCEDURE — G8484 FLU IMMUNIZE NO ADMIN: HCPCS | Performed by: INTERNAL MEDICINE

## 2023-12-11 PROCEDURE — 99213 OFFICE O/P EST LOW 20 MIN: CPT | Performed by: INTERNAL MEDICINE

## 2023-12-11 PROCEDURE — 1123F ACP DISCUSS/DSCN MKR DOCD: CPT | Performed by: INTERNAL MEDICINE

## 2023-12-11 NOTE — PROGRESS NOTES
HISTORY OF PRESENT ILLNESS    Chief Complaint   Patient presents with    Thyroid Problem    Blood Sugar Problem     IFG    Lab Collection       Presents for follow-up    Seeing Hematology Dr. Alana Garcia for follow-up on chronic pancytopenia. He was not anemic recently. Platelets and white count have been very mildly low and stable. Seeing Hepatology Dr. Jhonny Loera. Diagnosed with cirrhosis secondary to GRUB. MRI liver ordered. Results pending. Hypothyroidism follow-up  Reports mild fatigue, weight gain  denies heat/cold intolerance, bowel/skin changes or CVS symptoms, losing hair, feeling excessive energy, tremulousness, palpitations. Thyroid medication has been unchanged since last medication check and labs. Decreased dose 3/22/23  Lab Results   Component Value Date/Time    TSH 0.79 03/22/2023 04:09 PM    FT3 2.8 09/26/2019 02:14 PM   Gained weight  Wt Readings from Last 3 Encounters:   12/11/23 91.4 kg (201 lb 9.6 oz)   08/09/23 87.4 kg (192 lb 9.6 oz)   03/22/23 89.5 kg (197 lb 6.4 oz)     Impaired fasting glucose / Pre-diabetes follow-up  No results found for: \"GLU\"  Last hemoglobin a1c   Hemoglobin A1C   Date Value Ref Range Status   03/22/2023 5.6 4.0 - 5.6 % Final     Comment:     NEW METHOD  PLEASE NOTE NEW REFERENCE RANGE  (NOTE)  HbA1C Interpretive Ranges  <5.7              Normal  5.7 - 6.4         Consider Prediabetes  >6.5              Consider Diabetes     Diabetic diet compliance: compliant most of the time. Patient does not perform home glucose monitoring.         Review of Systems   All other systems reviewed and are negative, except as noted in HPI    Past Medical and Surgical History   has a past medical history of Abdominal lymphadenopathy, B12 deficiency, Cataract, Chronic anxiety, Coronary artery calcification seen on CT scan, Degenerative joint disease (DJD) of lumbar spine, Environmental allergies, Essential tremor, FH: colon cancer, Hepatitis A, Hypospadias, Hypothyroidism, IFG (impaired

## 2024-01-22 ENCOUNTER — HOSPITAL ENCOUNTER (OUTPATIENT)
Facility: HOSPITAL | Age: 79
Discharge: HOME | End: 2024-01-25
Attending: INTERNAL MEDICINE
Payer: MEDICARE

## 2024-01-22 VITALS — BODY MASS INDEX: 26.39 KG/M2 | WEIGHT: 200 LBS

## 2024-01-22 DIAGNOSIS — L81.4 LIVER SPOT: ICD-10-CM

## 2024-01-22 PROCEDURE — 6360000004 HC RX CONTRAST MEDICATION: Performed by: RADIOLOGY

## 2024-01-22 PROCEDURE — A9579 GAD-BASE MR CONTRAST NOS,1ML: HCPCS | Performed by: RADIOLOGY

## 2024-01-22 PROCEDURE — 74183 MRI ABD W/O CNTR FLWD CNTR: CPT

## 2024-01-22 RX ADMIN — GADOTERIDOL 18 ML: 279.3 INJECTION, SOLUTION INTRAVENOUS at 11:33

## 2024-02-01 ENCOUNTER — TELEPHONE (OUTPATIENT)
Age: 79
End: 2024-02-01

## 2024-02-01 NOTE — TELEPHONE ENCOUNTER
Doctor's office called to schedule NP appt. Records in [facility]. Referred to Mónica for review and asked to advise when to schedule.

## 2024-03-07 ENCOUNTER — OFFICE VISIT (OUTPATIENT)
Age: 79
End: 2024-03-07
Payer: MEDICARE

## 2024-03-07 VITALS
WEIGHT: 198.8 LBS | HEIGHT: 73 IN | HEART RATE: 66 BPM | DIASTOLIC BLOOD PRESSURE: 70 MMHG | TEMPERATURE: 98 F | OXYGEN SATURATION: 94 % | SYSTOLIC BLOOD PRESSURE: 110 MMHG | RESPIRATION RATE: 18 BRPM | BODY MASS INDEX: 26.35 KG/M2

## 2024-03-07 DIAGNOSIS — R53.83 OTHER FATIGUE: ICD-10-CM

## 2024-03-07 DIAGNOSIS — D69.6 THROMBOCYTOPENIA, UNSPECIFIED (HCC): ICD-10-CM

## 2024-03-07 DIAGNOSIS — J43.9 PULMONARY EMPHYSEMA, UNSPECIFIED EMPHYSEMA TYPE (HCC): ICD-10-CM

## 2024-03-07 DIAGNOSIS — I85.10 SECONDARY ESOPHAGEAL VARICES WITHOUT BLEEDING (HCC): ICD-10-CM

## 2024-03-07 DIAGNOSIS — E03.9 ACQUIRED HYPOTHYROIDISM: ICD-10-CM

## 2024-03-07 DIAGNOSIS — I73.9 PERIPHERAL VASCULAR DISEASE, UNSPECIFIED (HCC): ICD-10-CM

## 2024-03-07 DIAGNOSIS — R79.9 ABNORMAL FINDING OF BLOOD CHEMISTRY: ICD-10-CM

## 2024-03-07 DIAGNOSIS — R73.01 IFG (IMPAIRED FASTING GLUCOSE): ICD-10-CM

## 2024-03-07 DIAGNOSIS — D61.818 PANCYTOPENIA (HCC): ICD-10-CM

## 2024-03-07 DIAGNOSIS — K75.81 LIVER CIRRHOSIS SECONDARY TO NASH (NONALCOHOLIC STEATOHEPATITIS) (HCC): ICD-10-CM

## 2024-03-07 DIAGNOSIS — I73.9 CLAUDICATION OF BOTH LOWER EXTREMITIES (HCC): Primary | ICD-10-CM

## 2024-03-07 DIAGNOSIS — K74.60 LIVER CIRRHOSIS SECONDARY TO NASH (NONALCOHOLIC STEATOHEPATITIS) (HCC): ICD-10-CM

## 2024-03-07 PROCEDURE — G8484 FLU IMMUNIZE NO ADMIN: HCPCS | Performed by: INTERNAL MEDICINE

## 2024-03-07 PROCEDURE — 99215 OFFICE O/P EST HI 40 MIN: CPT | Performed by: INTERNAL MEDICINE

## 2024-03-07 PROCEDURE — 1123F ACP DISCUSS/DSCN MKR DOCD: CPT | Performed by: INTERNAL MEDICINE

## 2024-03-07 PROCEDURE — G8428 CUR MEDS NOT DOCUMENT: HCPCS | Performed by: INTERNAL MEDICINE

## 2024-03-07 PROCEDURE — 3023F SPIROM DOC REV: CPT | Performed by: INTERNAL MEDICINE

## 2024-03-07 PROCEDURE — 1036F TOBACCO NON-USER: CPT | Performed by: INTERNAL MEDICINE

## 2024-03-07 PROCEDURE — G8419 CALC BMI OUT NRM PARAM NOF/U: HCPCS | Performed by: INTERNAL MEDICINE

## 2024-03-07 ASSESSMENT — PATIENT HEALTH QUESTIONNAIRE - PHQ9
2. FEELING DOWN, DEPRESSED OR HOPELESS: 0
SUM OF ALL RESPONSES TO PHQ QUESTIONS 1-9: 0
SUM OF ALL RESPONSES TO PHQ QUESTIONS 1-9: 0
SUM OF ALL RESPONSES TO PHQ9 QUESTIONS 1 & 2: 0
SUM OF ALL RESPONSES TO PHQ QUESTIONS 1-9: 0
SUM OF ALL RESPONSES TO PHQ QUESTIONS 1-9: 0
1. LITTLE INTEREST OR PLEASURE IN DOING THINGS: 0

## 2024-03-07 NOTE — PROGRESS NOTES
Future    Secondary esophageal varices without bleeding (HCC)  Remains asymptomatic.  Monitored by GI.  With endoscopy February 2023 from Dr. Fuentes    Abnormal finding of blood chemistry  -     Lipid Panel; Future      lab results and schedule of future lab studies reviewed with patient  reviewed medications and side effects in detail    Return to clinic for further evaluation if new symptoms develop    Current Outpatient Medications   Medication Sig    UNITHROID 112 MCG tablet TAKE 1 TABLET BY MOUTH EVERY DAY BEFORE BREAKFAST    tamsulosin (FLOMAX) 0.4 MG capsule TAKE 1 CAPSULE BY MOUTH DAILY    finasteride (PROSCAR) 5 MG tablet TAKE 1 TABLET BY MOUTH DAILY    carvedilol (COREG) 3.125 MG tablet Take 2 tablets by mouth daily    Cholecalciferol 50 MCG (2000 UT) CAPS Take 1 capsule by mouth daily    clonazePAM (KLONOPIN) 0.5 MG tablet Take 1 tablet by mouth nightly.    cyanocobalamin 1000 MCG tablet Take 1 tablet by mouth daily    ferrous sulfate (SLOW FE) 142 (45 Fe) MG extended release tablet Take 142 mg by mouth every morning (before breakfast)    levocetirizine (XYZAL) 5 MG tablet Take by mouth daily as needed     No current facility-administered medications for this visit.

## 2024-03-08 LAB
ALBUMIN SERPL-MCNC: 3.2 G/DL (ref 3.5–5)
ALBUMIN/GLOB SERPL: 0.9 (ref 1.1–2.2)
ALP SERPL-CCNC: 138 U/L (ref 45–117)
ALT SERPL-CCNC: 50 U/L (ref 12–78)
ANION GAP SERPL CALC-SCNC: 4 MMOL/L (ref 5–15)
AST SERPL-CCNC: 55 U/L (ref 15–37)
BILIRUB SERPL-MCNC: 1.2 MG/DL (ref 0.2–1)
BUN SERPL-MCNC: 13 MG/DL (ref 6–20)
BUN/CREAT SERPL: 13 (ref 12–20)
CALCIUM SERPL-MCNC: 9.2 MG/DL (ref 8.5–10.1)
CHLORIDE SERPL-SCNC: 112 MMOL/L (ref 97–108)
CHOLEST SERPL-MCNC: 164 MG/DL
CK SERPL-CCNC: 81 U/L (ref 39–308)
CO2 SERPL-SCNC: 26 MMOL/L (ref 21–32)
CREAT SERPL-MCNC: 0.97 MG/DL (ref 0.7–1.3)
EST. AVERAGE GLUCOSE BLD GHB EST-MCNC: 117 MG/DL
GLOBULIN SER CALC-MCNC: 3.7 G/DL (ref 2–4)
GLUCOSE SERPL-MCNC: 88 MG/DL (ref 65–100)
HBA1C MFR BLD: 5.7 % (ref 4–5.6)
HDLC SERPL-MCNC: 66 MG/DL
HDLC SERPL: 2.5 (ref 0–5)
LDLC SERPL CALC-MCNC: 87.6 MG/DL (ref 0–100)
POTASSIUM SERPL-SCNC: 4.7 MMOL/L (ref 3.5–5.1)
PROT SERPL-MCNC: 6.9 G/DL (ref 6.4–8.2)
SODIUM SERPL-SCNC: 142 MMOL/L (ref 136–145)
T4 FREE SERPL-MCNC: 1.2 NG/DL (ref 0.8–1.5)
TRIGL SERPL-MCNC: 52 MG/DL
TSH SERPL DL<=0.05 MIU/L-ACNC: 6.43 UIU/ML (ref 0.36–3.74)
VLDLC SERPL CALC-MCNC: 10.4 MG/DL

## 2024-03-12 DIAGNOSIS — E03.9 ACQUIRED HYPOTHYROIDISM: ICD-10-CM

## 2024-03-12 RX ORDER — LEVOTHYROXINE SODIUM 125 UG/1
125 TABLET ORAL DAILY
Qty: 90 TABLET | Refills: 1 | Status: SHIPPED | OUTPATIENT
Start: 2024-03-12

## 2024-03-14 ENCOUNTER — HOSPITAL ENCOUNTER (OUTPATIENT)
Dept: VASCULAR SURGERY | Facility: HOSPITAL | Age: 79
Discharge: HOME | End: 2024-03-16
Attending: INTERNAL MEDICINE
Payer: MEDICARE

## 2024-03-14 DIAGNOSIS — I73.9 CLAUDICATION OF BOTH LOWER EXTREMITIES (HCC): ICD-10-CM

## 2024-03-14 DIAGNOSIS — I73.9 PERIPHERAL VASCULAR DISEASE, UNSPECIFIED (HCC): ICD-10-CM

## 2024-03-14 LAB
VAS LEFT ABI: 1.16
VAS LEFT ARM BP: 115 MMHG
VAS LEFT DORSALIS PEDIS BP: 127 MMHG
VAS LEFT PTA BP: 140 MMHG
VAS LEFT TBI: 0.76
VAS LEFT TOE PRESSURE: 92 MMHG
VAS RIGHT ABI: 1.12
VAS RIGHT ARM BP: 121 MMHG
VAS RIGHT DORSALIS PEDIS BP: 129 MMHG
VAS RIGHT PTA BP: 136 MMHG
VAS RIGHT TBI: 0.85
VAS RIGHT TOE PRESSURE: 103 MMHG

## 2024-03-14 PROCEDURE — 93922 UPR/L XTREMITY ART 2 LEVELS: CPT

## 2024-04-02 NOTE — PROGRESS NOTES
Pt is having significant Lumbar radiculopathy. Will prescribe Tramadol for pain.  MRIs pending    Baruch Barthel, [address]  Orthopaedic Spine Surgery  Physician Assistant to Dr. Serena Evangelista
Current and Past Psychiatric Diagnoses/Presenting Symptoms/Treatment Related Factors/Activating Events/Stressors

## 2024-04-30 DIAGNOSIS — N40.0 BENIGN PROSTATIC HYPERPLASIA WITHOUT LOWER URINARY TRACT SYMPTOMS: Primary | ICD-10-CM

## 2024-04-30 RX ORDER — FINASTERIDE 5 MG/1
TABLET, FILM COATED ORAL DAILY
Qty: 90 TABLET | Refills: 1 | Status: SHIPPED | OUTPATIENT
Start: 2024-04-30

## 2024-06-10 DIAGNOSIS — N40.0 BENIGN PROSTATIC HYPERPLASIA WITHOUT LOWER URINARY TRACT SYMPTOMS: ICD-10-CM

## 2024-06-10 RX ORDER — TAMSULOSIN HYDROCHLORIDE 0.4 MG/1
CAPSULE ORAL DAILY
Qty: 90 CAPSULE | Refills: 1 | Status: SHIPPED | OUTPATIENT
Start: 2024-06-10

## 2024-06-27 ENCOUNTER — OFFICE VISIT (OUTPATIENT)
Age: 79
End: 2024-06-27
Payer: MEDICARE

## 2024-06-27 VITALS
DIASTOLIC BLOOD PRESSURE: 67 MMHG | OXYGEN SATURATION: 95 % | BODY MASS INDEX: 25.17 KG/M2 | TEMPERATURE: 98.1 F | SYSTOLIC BLOOD PRESSURE: 114 MMHG | HEART RATE: 59 BPM | WEIGHT: 190.8 LBS

## 2024-06-27 DIAGNOSIS — K75.81 METABOLIC DYSFUNCTION-ASSOCIATED STEATOHEPATITIS (MASH): Primary | ICD-10-CM

## 2024-06-27 PROCEDURE — G8427 DOCREV CUR MEDS BY ELIG CLIN: HCPCS | Performed by: INTERNAL MEDICINE

## 2024-06-27 PROCEDURE — 99205 OFFICE O/P NEW HI 60 MIN: CPT | Performed by: INTERNAL MEDICINE

## 2024-06-27 PROCEDURE — 1036F TOBACCO NON-USER: CPT | Performed by: INTERNAL MEDICINE

## 2024-06-27 PROCEDURE — 1123F ACP DISCUSS/DSCN MKR DOCD: CPT | Performed by: INTERNAL MEDICINE

## 2024-06-27 PROCEDURE — G8419 CALC BMI OUT NRM PARAM NOF/U: HCPCS | Performed by: INTERNAL MEDICINE

## 2024-06-27 ASSESSMENT — PATIENT HEALTH QUESTIONNAIRE - PHQ9
SUM OF ALL RESPONSES TO PHQ QUESTIONS 1-9: 0
DEPRESSION UNABLE TO ASSESS: FUNCTIONAL CAPACITY MOTIVATION LIMITS ACCURACY
SUM OF ALL RESPONSES TO PHQ9 QUESTIONS 1 & 2: 0
SUM OF ALL RESPONSES TO PHQ QUESTIONS 1-9: 0
2. FEELING DOWN, DEPRESSED OR HOPELESS: NOT AT ALL
SUM OF ALL RESPONSES TO PHQ QUESTIONS 1-9: 0
SUM OF ALL RESPONSES TO PHQ QUESTIONS 1-9: 0
1. LITTLE INTEREST OR PLEASURE IN DOING THINGS: NOT AT ALL

## 2024-06-27 NOTE — PROGRESS NOTES
[facility]      Uday Perales MD, FACP, FACG, FAASLD      CYN [place]-ELPIDIO Ann, [facility]   Zofia Rattacos, [facility]   Cristelcarmelo Jackson, [facility]-  Ned Beckwith, [facility]   Jacque Dillon, [facility]   Anel Jean, [facility]   [address], [address]   [place]  [address] [phone]   FAX: [phone]  [facility]   [address], [address]   [place]  [address] [phone]   FAX: [phone]       Patient Care Team:  Celestine Littlejohn MD as PCP - General  Celestine Littlejohn MD as PCP - Empaneled Provider  Long Fuentes MD (Gastroenterology)  Julien Tejada MD (Pulmonary Disease)      Patient Active Problem List   Diagnosis    Hypothyroidism    Abdominal lymphadenopathy    Prostatic enlargement    B12 deficiency    Degenerative joint disease (DJD) of lumbar spine    IFG (impaired fasting glucose)    Plantar fasciitis    Essential tremor    Trigger finger    PVC (premature ventricular contraction)    Coronary artery calcification seen on CT scan    Pancytopenia (HCC)    Spinal stenosis of lumbar region at multiple levels    Chronic anxiety    Thrombocytopenia (HCC)    Environmental allergies    Obstructive sleep apnea    Cataract    REM sleep behavior disorder    Hypospadias    SCC (squamous cell carcinoma), hand, left    Cirrhosis (HCC)    Peripheral vascular disease (HCC)    Pulmonary emphysema, unspecified emphysema type (HCC)    Secondary esophageal varices without bleeding (HCC)    Metabolic dysfunction-associated steatohepatitis (MASH)       The clinicians listed above have asked me to see Thien Finn in consultation regarding management of cirrhosis that is presumed secondary to Metabolic Associated SteatoHepatitis (MASH).      All medical records sent by the referring

## 2024-06-27 NOTE — PROGRESS NOTES
No chief complaint on file.    Vitals:    06/27/24 1207   BP: 114/67   Site: Right Upper Arm   Position: Sitting   Pulse: 59   Temp: 98.1 °F (36.7 °C)   TempSrc: Temporal   SpO2: 95%   Weight: 86.5 kg (190 lb 12.8 oz)     .  \"Have you been to the ER, urgent care clinic since your last visit?  Hospitalized since your last visit?\"    NO    “Have you seen or consulted any other health care providers outside of [facility] since your last visit?”    NO            Click Here for Release of Records Request

## 2024-07-07 PROBLEM — K75.81 METABOLIC DYSFUNCTION-ASSOCIATED STEATOHEPATITIS (MASH): Status: ACTIVE | Noted: 2024-07-07

## 2024-07-07 PROBLEM — Z71.89 ADVANCED CARE PLANNING/COUNSELING DISCUSSION: Status: RESOLVED | Noted: 2017-09-08 | Resolved: 2024-07-07

## 2024-07-08 ENCOUNTER — OFFICE VISIT (OUTPATIENT)
Age: 79
End: 2024-07-08
Payer: MEDICARE

## 2024-07-08 VITALS
SYSTOLIC BLOOD PRESSURE: 122 MMHG | TEMPERATURE: 98.1 F | HEIGHT: 73 IN | OXYGEN SATURATION: 95 % | WEIGHT: 191.2 LBS | RESPIRATION RATE: 18 BRPM | BODY MASS INDEX: 25.34 KG/M2 | HEART RATE: 59 BPM | DIASTOLIC BLOOD PRESSURE: 79 MMHG

## 2024-07-08 DIAGNOSIS — E03.9 ACQUIRED HYPOTHYROIDISM: ICD-10-CM

## 2024-07-08 DIAGNOSIS — I85.10 SECONDARY ESOPHAGEAL VARICES WITHOUT BLEEDING (HCC): ICD-10-CM

## 2024-07-08 DIAGNOSIS — D61.818 PANCYTOPENIA (HCC): ICD-10-CM

## 2024-07-08 DIAGNOSIS — K75.81 METABOLIC DYSFUNCTION-ASSOCIATED STEATOHEPATITIS (MASH): Primary | ICD-10-CM

## 2024-07-08 PROCEDURE — 1123F ACP DISCUSS/DSCN MKR DOCD: CPT | Performed by: INTERNAL MEDICINE

## 2024-07-08 PROCEDURE — G8419 CALC BMI OUT NRM PARAM NOF/U: HCPCS | Performed by: INTERNAL MEDICINE

## 2024-07-08 PROCEDURE — 99213 OFFICE O/P EST LOW 20 MIN: CPT | Performed by: INTERNAL MEDICINE

## 2024-07-08 PROCEDURE — 1036F TOBACCO NON-USER: CPT | Performed by: INTERNAL MEDICINE

## 2024-07-08 PROCEDURE — G8428 CUR MEDS NOT DOCUMENT: HCPCS | Performed by: INTERNAL MEDICINE

## 2024-07-08 NOTE — PROGRESS NOTES
\"Have you been to the ER, urgent care clinic since your last visit?  Hospitalized since your last visit?\"    NO    “Have you seen or consulted any other health care providers outside of [facility] since your last visit?”    Hematology - Dr. Armando.            Click Here for Release of Records Request    
daily    clonazePAM (KLONOPIN) 0.5 MG tablet Take 1 tablet by mouth nightly.    cyanocobalamin 1000 MCG tablet Take 1 tablet by mouth daily    ferrous sulfate (SLOW FE) 142 (45 Fe) MG extended release tablet Take 142 mg by mouth every morning (before breakfast)    levocetirizine (XYZAL) 5 MG tablet Take by mouth daily as needed     No current facility-administered medications for this visit.

## 2024-10-01 ENCOUNTER — HOSPITAL ENCOUNTER (OUTPATIENT)
Facility: HOSPITAL | Age: 79
Discharge: HOME | End: 2024-10-04
Attending: INTERNAL MEDICINE
Payer: MEDICARE

## 2024-10-01 DIAGNOSIS — K75.81 LIVER CIRRHOSIS SECONDARY TO NASH (HCC): ICD-10-CM

## 2024-10-01 DIAGNOSIS — K74.60 LIVER CIRRHOSIS SECONDARY TO NASH (HCC): ICD-10-CM

## 2024-10-01 PROCEDURE — 76700 US EXAM ABDOM COMPLETE: CPT

## 2024-10-09 RX ORDER — LEVOTHYROXINE SODIUM 125 UG/1
125 TABLET ORAL DAILY
Qty: 90 TABLET | Refills: 1 | Status: SHIPPED | OUTPATIENT
Start: 2024-10-09

## 2024-10-09 NOTE — TELEPHONE ENCOUNTER
PCP: Celestine Littlejohn MD    Last appt: 7/8/2024   Future Appointments   Date Time Provider Department Center   11/8/2024  2:00 PM Celestine Littlejohn MD [facility]   3/10/2025  2:40 PM Celestine Littlejohn MD [facility] DE[facility]       Requested Prescriptions     Pending Prescriptions Disp Refills    UNITHROID 125 MCG tablet [Pharmacy Med Name: UNITHROID 0.125MG (125MCG) TABLETS] 90 tablet 1     Sig: TAKE 1 TABLET BY MOUTH DAILY. INCREASED 3/12/24     Last ordered 3/12/24    Anabel \"Paulie\" MATTI Land

## 2024-11-04 DIAGNOSIS — N40.0 BENIGN PROSTATIC HYPERPLASIA WITHOUT LOWER URINARY TRACT SYMPTOMS: ICD-10-CM

## 2024-11-05 RX ORDER — FINASTERIDE 5 MG/1
TABLET, FILM COATED ORAL DAILY
Qty: 90 TABLET | Refills: 1 | Status: SHIPPED | OUTPATIENT
Start: 2024-11-05

## 2024-12-16 DIAGNOSIS — N40.0 BENIGN PROSTATIC HYPERPLASIA WITHOUT LOWER URINARY TRACT SYMPTOMS: ICD-10-CM

## 2024-12-16 RX ORDER — TAMSULOSIN HYDROCHLORIDE 0.4 MG/1
CAPSULE ORAL DAILY
Qty: 90 CAPSULE | Refills: 1 | Status: SHIPPED | OUTPATIENT
Start: 2024-12-16

## 2024-12-19 ENCOUNTER — TRANSCRIBE ORDERS (OUTPATIENT)
Facility: HOSPITAL | Age: 79
End: 2024-12-19

## 2024-12-19 DIAGNOSIS — L81.4 LIVER SPOT: ICD-10-CM

## 2024-12-19 DIAGNOSIS — K74.60 HEPATIC CIRRHOSIS, UNSPECIFIED HEPATIC CIRRHOSIS TYPE, UNSPECIFIED WHETHER ASCITES PRESENT (HCC): Primary | ICD-10-CM

## 2025-01-10 ENCOUNTER — HOSPITAL ENCOUNTER (OUTPATIENT)
Facility: HOSPITAL | Age: 80
Discharge: HOME | End: 2025-01-13
Attending: INTERNAL MEDICINE
Payer: MEDICARE

## 2025-01-10 VITALS — WEIGHT: 190 LBS | BODY MASS INDEX: 25.07 KG/M2

## 2025-01-10 DIAGNOSIS — K74.60 HEPATIC CIRRHOSIS, UNSPECIFIED HEPATIC CIRRHOSIS TYPE, UNSPECIFIED WHETHER ASCITES PRESENT (HCC): ICD-10-CM

## 2025-01-10 DIAGNOSIS — L81.4 LIVER SPOT: ICD-10-CM

## 2025-01-10 PROCEDURE — 6360000004 HC RX CONTRAST MEDICATION: Performed by: RADIOLOGY

## 2025-01-10 PROCEDURE — A9579 GAD-BASE MR CONTRAST NOS,1ML: HCPCS | Performed by: RADIOLOGY

## 2025-01-10 PROCEDURE — 74183 MRI ABD W/O CNTR FLWD CNTR: CPT

## 2025-01-10 RX ADMIN — GADOTERIDOL 17 ML: 279.3 INJECTION, SOLUTION INTRAVENOUS at 12:52

## 2025-02-04 ENCOUNTER — TRANSCRIBE ORDERS (OUTPATIENT)
Facility: HOSPITAL | Age: 80
End: 2025-02-04

## 2025-02-04 DIAGNOSIS — R10.813 RIGHT LOWER QUADRANT ABDOMINAL TENDERNESS, REBOUND TENDERNESS PRESENCE NOT SPECIFIED: Primary | ICD-10-CM

## 2025-02-11 ENCOUNTER — TELEMEDICINE (OUTPATIENT)
Age: 80
End: 2025-02-11
Payer: MEDICARE

## 2025-02-11 DIAGNOSIS — K74.69 OTHER CIRRHOSIS OF LIVER (HCC): Primary | ICD-10-CM

## 2025-02-11 PROCEDURE — 99214 OFFICE O/P EST MOD 30 MIN: CPT | Performed by: INTERNAL MEDICINE

## 2025-02-11 PROCEDURE — 1159F MED LIST DOCD IN RCRD: CPT | Performed by: INTERNAL MEDICINE

## 2025-02-11 PROCEDURE — G8427 DOCREV CUR MEDS BY ELIG CLIN: HCPCS | Performed by: INTERNAL MEDICINE

## 2025-02-11 PROCEDURE — 1036F TOBACCO NON-USER: CPT | Performed by: INTERNAL MEDICINE

## 2025-02-11 PROCEDURE — G8419 CALC BMI OUT NRM PARAM NOF/U: HCPCS | Performed by: INTERNAL MEDICINE

## 2025-02-11 PROCEDURE — 1123F ACP DISCUSS/DSCN MKR DOCD: CPT | Performed by: INTERNAL MEDICINE

## 2025-02-11 ASSESSMENT — PATIENT HEALTH QUESTIONNAIRE - PHQ9
SUM OF ALL RESPONSES TO PHQ QUESTIONS 1-9: 0
SUM OF ALL RESPONSES TO PHQ9 QUESTIONS 1 & 2: 0
DEPRESSION UNABLE TO ASSESS: FUNCTIONAL CAPACITY MOTIVATION LIMITS ACCURACY
2. FEELING DOWN, DEPRESSED OR HOPELESS: NOT AT ALL
SUM OF ALL RESPONSES TO PHQ QUESTIONS 1-9: 0
1. LITTLE INTEREST OR PLEASURE IN DOING THINGS: NOT AT ALL
SUM OF ALL RESPONSES TO PHQ QUESTIONS 1-9: 0
SUM OF ALL RESPONSES TO PHQ QUESTIONS 1-9: 0

## 2025-02-11 ASSESSMENT — ANXIETY QUESTIONNAIRES
4. TROUBLE RELAXING: NOT AT ALL
3. WORRYING TOO MUCH ABOUT DIFFERENT THINGS: NOT AT ALL
2. NOT BEING ABLE TO STOP OR CONTROL WORRYING: NOT AT ALL
IF YOU CHECKED OFF ANY PROBLEMS ON THIS QUESTIONNAIRE, HOW DIFFICULT HAVE THESE PROBLEMS MADE IT FOR YOU TO DO YOUR WORK, TAKE CARE OF THINGS AT HOME, OR GET ALONG WITH OTHER PEOPLE: NOT DIFFICULT AT ALL
7. FEELING AFRAID AS IF SOMETHING AWFUL MIGHT HAPPEN: NOT AT ALL
GAD7 TOTAL SCORE: 0
1. FEELING NERVOUS, ANXIOUS, OR ON EDGE: NOT AT ALL
6. BECOMING EASILY ANNOYED OR IRRITABLE: NOT AT ALL
5. BEING SO RESTLESS THAT IT IS HARD TO SIT STILL: NOT AT ALL

## 2025-02-11 NOTE — PROGRESS NOTES
Chief Complaint   Patient presents with    Follow-up     \"Have you been to the ER, urgent care clinic since your last visit?  Hospitalized since your last visit?\"    NO    “Have you seen or consulted any other health care providers outside our system since your last visit?”    NO

## 2025-02-11 NOTE — PROGRESS NOTES
Thien Finn, was evaluated through a synchronous (real-time) audio-video encounter. The patient (and/or guardian if applicable) is aware that this is a billable service, which includes applicable co-pays. This virtual visit was conducted with patient's (and/or legal guardian's) consent. Patient identification was verified, and a caregiver was present when appropriate.  The patient was located at Home: [address] [place] [id number]  The provider was located at Facility (Login Dept): [facility]  [address] [place] [address] [phone]  Yes, I confirm.    Total time spent on this encounter: Not billed by time    --Uday Perales MD on 2/24/2025 at 8:14 AM    An electronic signature was used to authenticate this note.        [facility]      Uday Perales MD, FACP, FACG, FAASLD      Anne-Marie Hernandes, PA-ELPIDIO Ann, [facility]-BC   Zofia Hernadez, [facility]-   Cristel Jackson, [facility]-  Ned Beckwith, [facility]-   Jacque Dillon, [facility]   Anel Pena [facility]   at [address], [address]   [place]  [address] [phone]   FAX: [phone]  [address], [address]   [place]  [address] [phone]   FAX: [phone]       Patient Care Team:  Celestine Littlejohn MD as PCP - General  Celestine Littlejohn MD as PCP - Empaneled Provider  Long Fuentes MD (Gastroenterology)  Julien Tejada MD (Pulmonary Disease)      Patient Active Problem List   Diagnosis    Hypothyroidism    Abdominal lymphadenopathy    Prostatic enlargement    B12 deficiency    Degenerative joint disease (DJD) of lumbar spine    IFG (impaired fasting glucose)    Plantar fasciitis    Essential tremor    Trigger finger    PVC (premature ventricular contraction)    Coronary

## 2025-02-12 ENCOUNTER — HOSPITAL ENCOUNTER (OUTPATIENT)
Facility: HOSPITAL | Age: 80
Discharge: HOME | End: 2025-02-15
Payer: MEDICARE

## 2025-02-12 DIAGNOSIS — R10.813 RIGHT LOWER QUADRANT ABDOMINAL TENDERNESS, REBOUND TENDERNESS PRESENCE NOT SPECIFIED: ICD-10-CM

## 2025-02-12 LAB — CREAT BLD-MCNC: 0.9 MG/DL (ref 0.6–1.3)

## 2025-02-12 PROCEDURE — 82565 ASSAY OF CREATININE: CPT

## 2025-02-12 PROCEDURE — 74177 CT ABD & PELVIS W/CONTRAST: CPT

## 2025-02-12 PROCEDURE — 6360000004 HC RX CONTRAST MEDICATION: Performed by: FAMILY MEDICINE

## 2025-02-12 RX ORDER — IOPAMIDOL 755 MG/ML
100 INJECTION, SOLUTION INTRAVASCULAR
Status: COMPLETED | OUTPATIENT
Start: 2025-02-12 | End: 2025-02-12

## 2025-02-12 RX ADMIN — IOPAMIDOL 100 ML: 755 INJECTION, SOLUTION INTRAVENOUS at 18:52

## 2025-02-17 DIAGNOSIS — K75.81 METABOLIC DYSFUNCTION-ASSOCIATED STEATOHEPATITIS (MASH): Primary | ICD-10-CM

## 2025-02-17 DIAGNOSIS — R16.0 LIVER MASS: ICD-10-CM

## 2025-02-17 DIAGNOSIS — K76.9 LIVER LESION: ICD-10-CM

## 2025-02-18 ENCOUNTER — TRANSCRIBE ORDERS (OUTPATIENT)
Facility: HOSPITAL | Age: 80
End: 2025-02-18

## 2025-02-18 DIAGNOSIS — D72.819 LEUKOPENIA, UNSPECIFIED TYPE: ICD-10-CM

## 2025-02-18 DIAGNOSIS — D64.9 ANEMIA, UNSPECIFIED TYPE: Primary | ICD-10-CM

## 2025-03-13 ENCOUNTER — TELEPHONE (OUTPATIENT)
Age: 80
End: 2025-03-13

## 2025-03-13 SDOH — HEALTH STABILITY: PHYSICAL HEALTH: ON AVERAGE, HOW MANY MINUTES DO YOU ENGAGE IN EXERCISE AT THIS LEVEL?: 30 MIN

## 2025-03-13 SDOH — HEALTH STABILITY: PHYSICAL HEALTH: ON AVERAGE, HOW MANY DAYS PER WEEK DO YOU ENGAGE IN MODERATE TO STRENUOUS EXERCISE (LIKE A BRISK WALK)?: 7 DAYS

## 2025-03-13 ASSESSMENT — PATIENT HEALTH QUESTIONNAIRE - PHQ9
SUM OF ALL RESPONSES TO PHQ QUESTIONS 1-9: 0
1. LITTLE INTEREST OR PLEASURE IN DOING THINGS: NOT AT ALL
SUM OF ALL RESPONSES TO PHQ QUESTIONS 1-9: 0
2. FEELING DOWN, DEPRESSED OR HOPELESS: NOT AT ALL

## 2025-03-13 ASSESSMENT — LIFESTYLE VARIABLES
HOW OFTEN DO YOU HAVE A DRINK CONTAINING ALCOHOL: 1
HOW OFTEN DO YOU HAVE SIX OR MORE DRINKS ON ONE OCCASION: 1
HOW MANY STANDARD DRINKS CONTAINING ALCOHOL DO YOU HAVE ON A TYPICAL DAY: PATIENT DOES NOT DRINK
HOW OFTEN DO YOU HAVE A DRINK CONTAINING ALCOHOL: NEVER
HOW MANY STANDARD DRINKS CONTAINING ALCOHOL DO YOU HAVE ON A TYPICAL DAY: 0

## 2025-03-13 NOTE — TELEPHONE ENCOUNTER
Called patient to discuss Oncoguard liver testing, spoke with he and his wife, he will  the test kit from our office tomorrow for completion of the test next week at [facility] [address] near his home. Provided my direct contact information for any questions or concerns.

## 2025-03-14 ENCOUNTER — HOSPITAL ENCOUNTER (OUTPATIENT)
Facility: HOSPITAL | Age: 80
Discharge: HOME | End: 2025-03-17
Attending: INTERNAL MEDICINE
Payer: MEDICARE

## 2025-03-14 VITALS — HEIGHT: 73 IN | WEIGHT: 190 LBS | BODY MASS INDEX: 25.18 KG/M2

## 2025-03-14 DIAGNOSIS — D64.9 ANEMIA, UNSPECIFIED TYPE: ICD-10-CM

## 2025-03-14 DIAGNOSIS — D72.819 LEUKOPENIA, UNSPECIFIED TYPE: ICD-10-CM

## 2025-03-14 LAB
GLUCOSE BLD STRIP.AUTO-MCNC: 96 MG/DL (ref 65–117)
SERVICE CMNT-IMP: NORMAL

## 2025-03-14 PROCEDURE — A9609 HC RX DIAGNOSTIC RADIOPHARMACEUTICAL: HCPCS | Performed by: INTERNAL MEDICINE

## 2025-03-14 PROCEDURE — 82962 GLUCOSE BLOOD TEST: CPT

## 2025-03-14 PROCEDURE — 3430000000 HC RX DIAGNOSTIC RADIOPHARMACEUTICAL: Performed by: INTERNAL MEDICINE

## 2025-03-14 PROCEDURE — 78815 PET IMAGE W/CT SKULL-THIGH: CPT

## 2025-03-14 RX ORDER — FLUDEOXYGLUCOSE F-18 500 MCI/ML
11.04 INJECTION INTRAVENOUS ONCE
Status: COMPLETED | OUTPATIENT
Start: 2025-03-14 | End: 2025-03-14

## 2025-03-14 RX ADMIN — FLUDEOXYGLUCOSE F-18 11.04 MILLICURIE: 500 INJECTION INTRAVENOUS at 11:47

## 2025-03-15 SDOH — ECONOMIC STABILITY: FOOD INSECURITY: WITHIN THE PAST 12 MONTHS, YOU WORRIED THAT YOUR FOOD WOULD RUN OUT BEFORE YOU GOT MONEY TO BUY MORE.: NEVER TRUE

## 2025-03-15 SDOH — ECONOMIC STABILITY: FOOD INSECURITY: WITHIN THE PAST 12 MONTHS, THE FOOD YOU BOUGHT JUST DIDN'T LAST AND YOU DIDN'T HAVE MONEY TO GET MORE.: NEVER TRUE

## 2025-03-15 SDOH — ECONOMIC STABILITY: TRANSPORTATION INSECURITY
IN THE PAST 12 MONTHS, HAS THE LACK OF TRANSPORTATION KEPT YOU FROM MEDICAL APPOINTMENTS OR FROM GETTING MEDICATIONS?: NO

## 2025-03-15 SDOH — ECONOMIC STABILITY: INCOME INSECURITY: IN THE LAST 12 MONTHS, WAS THERE A TIME WHEN YOU WERE NOT ABLE TO PAY THE MORTGAGE OR RENT ON TIME?: NO

## 2025-03-15 SDOH — ECONOMIC STABILITY: TRANSPORTATION INSECURITY
IN THE PAST 12 MONTHS, HAS LACK OF TRANSPORTATION KEPT YOU FROM MEETINGS, WORK, OR FROM GETTING THINGS NEEDED FOR DAILY LIVING?: NO

## 2025-03-17 ENCOUNTER — OFFICE VISIT (OUTPATIENT)
Facility: CLINIC | Age: 80
End: 2025-03-17
Payer: MEDICARE

## 2025-03-17 VITALS
BODY MASS INDEX: 24.84 KG/M2 | WEIGHT: 187.4 LBS | DIASTOLIC BLOOD PRESSURE: 72 MMHG | OXYGEN SATURATION: 94 % | HEIGHT: 73 IN | TEMPERATURE: 98.4 F | HEART RATE: 64 BPM | SYSTOLIC BLOOD PRESSURE: 117 MMHG

## 2025-03-17 DIAGNOSIS — Z12.5 SCREENING FOR PROSTATE CANCER: ICD-10-CM

## 2025-03-17 DIAGNOSIS — J43.9 PULMONARY EMPHYSEMA, UNSPECIFIED EMPHYSEMA TYPE (HCC): ICD-10-CM

## 2025-03-17 DIAGNOSIS — Z00.00 MEDICARE ANNUAL WELLNESS VISIT, SUBSEQUENT: Primary | ICD-10-CM

## 2025-03-17 DIAGNOSIS — K76.9 LIVER LESION: ICD-10-CM

## 2025-03-17 DIAGNOSIS — D61.818 PANCYTOPENIA: ICD-10-CM

## 2025-03-17 DIAGNOSIS — R59.0: ICD-10-CM

## 2025-03-17 DIAGNOSIS — E03.9 ACQUIRED HYPOTHYROIDISM: ICD-10-CM

## 2025-03-17 DIAGNOSIS — I25.10 CORONARY ARTERY CALCIFICATION SEEN ON CT SCAN: ICD-10-CM

## 2025-03-17 DIAGNOSIS — R73.01 IFG (IMPAIRED FASTING GLUCOSE): ICD-10-CM

## 2025-03-17 DIAGNOSIS — K40.90 RIGHT INGUINAL HERNIA: ICD-10-CM

## 2025-03-17 DIAGNOSIS — K75.81 METABOLIC DYSFUNCTION-ASSOCIATED STEATOHEPATITIS (MASH): ICD-10-CM

## 2025-03-17 DIAGNOSIS — I85.10 SECONDARY ESOPHAGEAL VARICES WITHOUT BLEEDING (HCC): ICD-10-CM

## 2025-03-17 PROCEDURE — 1160F RVW MEDS BY RX/DR IN RCRD: CPT | Performed by: INTERNAL MEDICINE

## 2025-03-17 PROCEDURE — 1125F AMNT PAIN NOTED PAIN PRSNT: CPT | Performed by: INTERNAL MEDICINE

## 2025-03-17 PROCEDURE — 1159F MED LIST DOCD IN RCRD: CPT | Performed by: INTERNAL MEDICINE

## 2025-03-17 PROCEDURE — 1036F TOBACCO NON-USER: CPT | Performed by: INTERNAL MEDICINE

## 2025-03-17 PROCEDURE — 99215 OFFICE O/P EST HI 40 MIN: CPT | Performed by: INTERNAL MEDICINE

## 2025-03-17 PROCEDURE — 1123F ACP DISCUSS/DSCN MKR DOCD: CPT | Performed by: INTERNAL MEDICINE

## 2025-03-17 PROCEDURE — G8427 DOCREV CUR MEDS BY ELIG CLIN: HCPCS | Performed by: INTERNAL MEDICINE

## 2025-03-17 PROCEDURE — G0439 PPPS, SUBSEQ VISIT: HCPCS | Performed by: INTERNAL MEDICINE

## 2025-03-17 PROCEDURE — 3023F SPIROM DOC REV: CPT | Performed by: INTERNAL MEDICINE

## 2025-03-17 PROCEDURE — G8420 CALC BMI NORM PARAMETERS: HCPCS | Performed by: INTERNAL MEDICINE

## 2025-03-17 NOTE — PROGRESS NOTES
Medicare Annual Wellness Visit    Thien Finn is here for Medicare AWV and Hernia    Thien was seen today for medicare awv and hernia.    Diagnoses and all orders for this visit:    Medicare annual wellness visit, subsequent  Recommend Tdap.  Otherwise up-to-date.  -     Tdap (ADACEL) 5-2-15.5 LF-MCG/0.5 injection; Inject 0.5 mLs into the muscle once for 1 dose    Screening for prostate cancer  Symptoms overall stable on finasteride and tamsulosin.  Recommend repeat PSA, given recent inguinal lymphadenopathy.  If stable and normal, can discontinue PSA testing.  -     PSA Screening; Future    Right inguinal hernia  Progressively symptomatic right inguinal hernia.  Commend consultation with general surgery as soon as possible for surgical intervention.  Patient appears stable for hernia or surgery.  He.  -     [facility] MD, General Surgery, [place]    Metabolic dysfunction-associated steatohepatitis (MASH)  This condition appears to be stable and is being evaluated and managed by his specialist hepatology.   No acute findings today warrant change in management plan.      Liver lesions  Small enhancing lesions seen on CT and MRI.  PET scan with nothing significant.  Additional serologies are pending per patient.  Has close follow-up with hepatology regarding this.    Obturator lymphadenopathy  Plus left inguinal lymphadenopathy.  Mildly avid on PET scan.  Following up with oncology as well with hepatology could consider FNA.    Pancytopenia (HCC)  Stable.  Follow by oncology and hepatology    Secondary esophageal varices without bleeding (HCC)  Asymptomatic.  Surveillance endoscopies ongoing.    Acquired hypothyroidism  Unclear status on increase dose of levothyroxine.  Repeat labs.  Adjust based on results.  Relatively asymptomatic.  -     TSH; Future  -     T4, Free; Future    Pulmonary emphysema, unspecified emphysema type (HCC)  Asymptomatic at this time.  On no specific therapy.    IFG

## 2025-03-17 NOTE — PROGRESS NOTES
Identified pt with two pt identifiers(name and ). Reviewed record in preparation for visit and have obtained necessary documentation. All patient medications has been reviewed.  Chief Complaint   Patient presents with    Medicare AWV    Hernia       Health Maintenance Due   Topic    Hepatitis B vaccine (1 of 3 - Risk 3-dose series)    DTaP/Tdap/Td vaccine (1 - Tdap)    Annual Wellness Visit (Medicare)      Health Maintenance Review: Patient reminded of \"due or due soon\" health maintenance. I have asked the patient to contact his/her primary care provider (PCP) for follow-up on his/her health maintenance.    Wt Readings from Last 3 Encounters:   25 85 kg (187 lb 6.4 oz)   25 86.2 kg (190 lb)   01/10/25 86.2 kg (190 lb)     Temp Readings from Last 3 Encounters:   25 98.4 °F (36.9 °C)   24 98.1 °F (36.7 °C) (Oral)   24 98.1 °F (36.7 °C) (Temporal)     BP Readings from Last 3 Encounters:   25 117/72   24 122/79   24 114/67     Pulse Readings from Last 3 Encounters:   25 64   24 59   24 59       1. \"Have you been to the ER, urgent care clinic since your last visit?  Hospitalized since your last visit?\" No    2. \"Have you seen or consulted any other health care providers outside of the [facility] since your last visit?\"  [facility]      3. For patients aged 45-75: Has the patient had a colonoscopy / FIT/ Cologuard? NA - based on age      Patient is accompanied by wife I have received verbal consent from Thien Finn to discuss any/all medical information while they are present in the room.

## 2025-03-19 ENCOUNTER — TELEPHONE (OUTPATIENT)
Age: 80
End: 2025-03-19

## 2025-03-19 NOTE — TELEPHONE ENCOUNTER
Placed outbound call to patient to attempt scheduling initial consult with Dr. London. Patient did not answer, LVM and requested call back in order schedule.

## 2025-03-21 DIAGNOSIS — E03.9 ACQUIRED HYPOTHYROIDISM: ICD-10-CM

## 2025-03-21 DIAGNOSIS — Z12.5 SCREENING FOR PROSTATE CANCER: ICD-10-CM

## 2025-03-21 DIAGNOSIS — I25.10 CORONARY ARTERY CALCIFICATION SEEN ON CT SCAN: ICD-10-CM

## 2025-03-21 LAB
CHOLEST SERPL-MCNC: 112 MG/DL
HDLC SERPL-MCNC: 56 MG/DL
HDLC SERPL: 2 (ref 0–5)
LDLC SERPL CALC-MCNC: 45.8 MG/DL (ref 0–100)
PSA SERPL-MCNC: 0.4 NG/ML (ref 0.01–4)
T4 FREE SERPL-MCNC: 1.1 NG/DL (ref 0.8–1.5)
TRIGL SERPL-MCNC: 51 MG/DL
TSH SERPL DL<=0.05 MIU/L-ACNC: 17.8 UIU/ML (ref 0.36–3.74)
VLDLC SERPL CALC-MCNC: 10.2 MG/DL

## 2025-03-23 ENCOUNTER — RESULTS FOLLOW-UP (OUTPATIENT)
Facility: CLINIC | Age: 80
End: 2025-03-23

## 2025-03-23 DIAGNOSIS — E03.9 ACQUIRED HYPOTHYROIDISM: Primary | ICD-10-CM

## 2025-03-23 RX ORDER — LEVOTHYROXINE SODIUM 150 UG/1
150 TABLET ORAL DAILY
Qty: 90 TABLET | Refills: 1 | Status: SHIPPED | OUTPATIENT
Start: 2025-03-23

## 2025-03-25 ENCOUNTER — OFFICE VISIT (OUTPATIENT)
Age: 80
End: 2025-03-25
Payer: MEDICARE

## 2025-03-25 VITALS
SYSTOLIC BLOOD PRESSURE: 112 MMHG | OXYGEN SATURATION: 96 % | HEIGHT: 73 IN | BODY MASS INDEX: 24.68 KG/M2 | RESPIRATION RATE: 18 BRPM | TEMPERATURE: 98.2 F | HEART RATE: 57 BPM | WEIGHT: 186.2 LBS | DIASTOLIC BLOOD PRESSURE: 74 MMHG

## 2025-03-25 DIAGNOSIS — K40.90 NON-RECURRENT UNILATERAL INGUINAL HERNIA WITHOUT OBSTRUCTION OR GANGRENE: Primary | ICD-10-CM

## 2025-03-25 DIAGNOSIS — R59.0 INGUINAL LYMPHADENOPATHY: ICD-10-CM

## 2025-03-25 PROCEDURE — 1125F AMNT PAIN NOTED PAIN PRSNT: CPT | Performed by: SURGERY

## 2025-03-25 PROCEDURE — G8427 DOCREV CUR MEDS BY ELIG CLIN: HCPCS | Performed by: SURGERY

## 2025-03-25 PROCEDURE — 1159F MED LIST DOCD IN RCRD: CPT | Performed by: SURGERY

## 2025-03-25 PROCEDURE — 1123F ACP DISCUSS/DSCN MKR DOCD: CPT | Performed by: SURGERY

## 2025-03-25 PROCEDURE — G8420 CALC BMI NORM PARAMETERS: HCPCS | Performed by: SURGERY

## 2025-03-25 PROCEDURE — 1036F TOBACCO NON-USER: CPT | Performed by: SURGERY

## 2025-03-25 PROCEDURE — 99204 OFFICE O/P NEW MOD 45 MIN: CPT | Performed by: SURGERY

## 2025-03-25 RX ORDER — SODIUM CHLORIDE 0.9 % (FLUSH) 0.9 %
5-40 SYRINGE (ML) INJECTION EVERY 12 HOURS SCHEDULED
OUTPATIENT
Start: 2025-03-25

## 2025-03-25 RX ORDER — SODIUM CHLORIDE 9 MG/ML
INJECTION, SOLUTION INTRAVENOUS PRN
OUTPATIENT
Start: 2025-03-25

## 2025-03-25 RX ORDER — SODIUM CHLORIDE 0.9 % (FLUSH) 0.9 %
5-40 SYRINGE (ML) INJECTION PRN
OUTPATIENT
Start: 2025-03-25

## 2025-03-25 ASSESSMENT — PATIENT HEALTH QUESTIONNAIRE - PHQ9
1. LITTLE INTEREST OR PLEASURE IN DOING THINGS: NOT AT ALL
2. FEELING DOWN, DEPRESSED OR HOPELESS: SEVERAL DAYS
SUM OF ALL RESPONSES TO PHQ QUESTIONS 1-9: 1

## 2025-03-25 NOTE — PROGRESS NOTES
Identified pt with two pt identifiers (name and ). Reviewed chart in preparation for visit and have obtained necessary documentation.    Thien Finn is a 79 y.o. male  Chief Complaint   Patient presents with    Hernia     Right inguinal    Lymphadenopathy     Potential     /74 (BP Site: Left Upper Arm, Patient Position: Sitting, BP Cuff Size: Large Adult)   Pulse 57   Temp 98.2 °F (36.8 °C) (Oral)   Resp 18   Ht 1.854 m (6' 1\")   Wt 84.5 kg (186 lb 3.2 oz)   SpO2 96%   BMI 24.57 kg/m²     1. Have you been to the ER, urgent care clinic since your last visit?  Hospitalized since your last visit?no    2. Have you seen or consulted any other health care providers outside of the [facility] since your last visit?  Include any pap smears or colon screening. no  
NIGHTLY    cyanocobalamin 1,000 mcg, DAILY    ferrous sulfate (SLOW FE) 142 mg, DAILY BEFORE BREAKFAST    finasteride (PROSCAR) 5 MG tablet Oral, DAILY    Multiple Vitamins-Minerals (MULTIVITAL PO) Take by mouth    tamsulosin (FLOMAX) 0.4 MG capsule Oral, DAILY    Unithroid 150 mcg, Oral, DAILY, Increased 3/23/25    vitamin D (CHOLECALCIFEROL) 2,000 Units, DAILY       Allergies   Allergen Reactions    Aspirin Hives    Ibuprofen Hives    Hydrocodone-Acetaminophen Itching, Rash and Swelling     Pt states he is not allergic to tylenol/acetaminophen and currently takes tylenol for pain. Pt states is allergic to hydrocodone only.      Levofloxacin Rash     Body aches        Past Medical History:   Diagnosis Date    Abdominal lymphadenopathy 2002    Dr. Hearn.  lymph node enlargement near pancreas.  EUS biopsy 1/22/02 benign, but \"possible pneumonia\"    B12 deficiency     Cataract     Dr. Arriaza    Chronic anxiety     Coronary artery calcification seen on CT scan 04/11/2016    patient with LOW cholesterol.  A.  EBT (10/4/10):  LM 8, , LCx 0, .  Total = 331.    Degenerative joint disease (DJD) of lumbar spine     MRI 3/2017 shows moderate disease and mild stenosis    Environmental allergies     Dr. Weber. also med allergies ibuprofen    Essential tremor     FH: colon cancer     mother    Hepatitis A     as child    Hypospadias     Hypothyroidism 04/11/2016    saw Dr. Madera int he past    IFG (impaired fasting glucose) 04/06/2016    a1c 6.7%    Liver cirrhosis secondary to JOHNSON (nonalcoholic steatohepatitis) (HCC) 03/22/2023    Dr. Fuentes    Lymphadenopathy 2002    left axillary biopsy 2/14/02 - benign.  Dr. Hearn, Dr. Ray    Mononucleosis     Normal cardiac stress test 04/2016    Dr. Bennett    Obstructive sleep apnea     on CPAP.  Dr. Tejada    Pancytopenia (HCC)     Dr King.  s/p marrow biopsy 11/2020    Plantar fasciitis     Dr. Su.  hx R cortisone injection    Prostatic enlargement 12/06/2017

## 2025-03-26 ENCOUNTER — TELEPHONE (OUTPATIENT)
Age: 80
End: 2025-03-26

## 2025-03-26 ENCOUNTER — PREP FOR PROCEDURE (OUTPATIENT)
Age: 80
End: 2025-03-26

## 2025-03-26 DIAGNOSIS — K40.90 NON-RECURRENT UNILATERAL INGUINAL HERNIA WITHOUT OBSTRUCTION OR GANGRENE: ICD-10-CM

## 2025-03-26 DIAGNOSIS — R59.0 INGUINAL LYMPHADENOPATHY: ICD-10-CM

## 2025-03-26 NOTE — TELEPHONE ENCOUNTER
Returned call to patient and verified using two patient identifiers.  Advised patient the biggest concern is his pain levels at the time of his MRI.  Recommended he makes sure to take some pain medication prior to his MRI to make it comfortable to lay flat for an extended period of time to have the MRI performed.  Pt understood and was thankful for the call.

## 2025-03-26 NOTE — TELEPHONE ENCOUNTER
Called and spoke to PT, confirmed surgery with Dr. London on 4/1/25. Notified PT of arrival time and PT confirmed address. Surgical letter will be sent in the mail and uploaded to [employer]. PT also asked will the surgery one 4/1/25 effect his MRI on 4/10/25. I let him know I would pass long the info to the clinical staff just to confirm. PT understood.

## 2025-03-27 ENCOUNTER — TELEPHONE (OUTPATIENT)
Age: 80
End: 2025-03-27

## 2025-03-27 ENCOUNTER — HOSPITAL ENCOUNTER (OUTPATIENT)
Facility: HOSPITAL | Age: 80
Discharge: HOME | End: 2025-03-30
Payer: MEDICARE

## 2025-03-27 VITALS
SYSTOLIC BLOOD PRESSURE: 124 MMHG | DIASTOLIC BLOOD PRESSURE: 67 MMHG | TEMPERATURE: 97.3 F | OXYGEN SATURATION: 97 % | RESPIRATION RATE: 16 BRPM | HEART RATE: 56 BPM | HEIGHT: 73 IN | WEIGHT: 187 LBS | BODY MASS INDEX: 24.78 KG/M2

## 2025-03-27 DIAGNOSIS — R59.0 INGUINAL LYMPHADENOPATHY: ICD-10-CM

## 2025-03-27 DIAGNOSIS — K40.90 NON-RECURRENT UNILATERAL INGUINAL HERNIA WITHOUT OBSTRUCTION OR GANGRENE: ICD-10-CM

## 2025-03-27 LAB
ALBUMIN SERPL-MCNC: 3.2 G/DL (ref 3.5–5)
ALBUMIN/GLOB SERPL: 0.9 (ref 1.1–2.2)
ALP SERPL-CCNC: 138 U/L (ref 45–117)
ALT SERPL-CCNC: 62 U/L (ref 12–78)
ANION GAP SERPL CALC-SCNC: 4 MMOL/L (ref 2–12)
AST SERPL-CCNC: 70 U/L (ref 15–37)
BASOPHILS # BLD: 0.04 K/UL (ref 0–0.1)
BASOPHILS NFR BLD: 0.9 % (ref 0–1)
BILIRUB SERPL-MCNC: 1.7 MG/DL (ref 0.2–1)
BUN SERPL-MCNC: 17 MG/DL (ref 6–20)
BUN/CREAT SERPL: 20 (ref 12–20)
CALCIUM SERPL-MCNC: 8.8 MG/DL (ref 8.5–10.1)
CHLORIDE SERPL-SCNC: 108 MMOL/L (ref 97–108)
CO2 SERPL-SCNC: 29 MMOL/L (ref 21–32)
CREAT SERPL-MCNC: 0.85 MG/DL (ref 0.7–1.3)
DIFFERENTIAL METHOD BLD: ABNORMAL
EOSINOPHIL # BLD: 0.18 K/UL (ref 0–0.4)
EOSINOPHIL NFR BLD: 4 % (ref 0–7)
ERYTHROCYTE [DISTWIDTH] IN BLOOD BY AUTOMATED COUNT: 14 % (ref 11.5–14.5)
GLOBULIN SER CALC-MCNC: 3.5 G/DL (ref 2–4)
GLUCOSE SERPL-MCNC: 84 MG/DL (ref 65–100)
HCT VFR BLD AUTO: 38.9 % (ref 36.6–50.3)
HGB BLD-MCNC: 13.3 G/DL (ref 12.1–17)
IMM GRANULOCYTES # BLD AUTO: 0.01 K/UL (ref 0–0.04)
IMM GRANULOCYTES NFR BLD AUTO: 0.2 % (ref 0–0.5)
INR PPP: 1.2 (ref 0.9–1.1)
LYMPHOCYTES # BLD: 1.12 K/UL (ref 0.8–3.5)
LYMPHOCYTES NFR BLD: 24.8 % (ref 12–49)
MCH RBC QN AUTO: 34.2 PG (ref 26–34)
MCHC RBC AUTO-ENTMCNC: 34.2 G/DL (ref 30–36.5)
MCV RBC AUTO: 100 FL (ref 80–99)
MONOCYTES # BLD: 0.54 K/UL (ref 0–1)
MONOCYTES NFR BLD: 12 % (ref 5–13)
NEUTS SEG # BLD: 2.62 K/UL (ref 1.8–8)
NEUTS SEG NFR BLD: 58.1 % (ref 32–75)
NRBC # BLD: 0 K/UL (ref 0–0.01)
NRBC BLD-RTO: 0 PER 100 WBC
PLATELET # BLD AUTO: 130 K/UL (ref 150–400)
PMV BLD AUTO: 9.9 FL (ref 8.9–12.9)
POTASSIUM SERPL-SCNC: 4.6 MMOL/L (ref 3.5–5.1)
PROT SERPL-MCNC: 6.7 G/DL (ref 6.4–8.2)
PROTHROMBIN TIME: 12.5 SEC (ref 9.2–11.2)
RBC # BLD AUTO: 3.89 M/UL (ref 4.1–5.7)
SODIUM SERPL-SCNC: 141 MMOL/L (ref 136–145)
WBC # BLD AUTO: 4.5 K/UL (ref 4.1–11.1)

## 2025-03-27 PROCEDURE — 93005 ELECTROCARDIOGRAM TRACING: CPT

## 2025-03-27 PROCEDURE — 85025 COMPLETE CBC W/AUTO DIFF WBC: CPT

## 2025-03-27 PROCEDURE — 85610 PROTHROMBIN TIME: CPT

## 2025-03-27 PROCEDURE — 80053 COMPREHEN METABOLIC PANEL: CPT

## 2025-03-27 PROCEDURE — 36415 COLL VENOUS BLD VENIPUNCTURE: CPT

## 2025-03-27 ASSESSMENT — PAIN DESCRIPTION - ORIENTATION: ORIENTATION: RIGHT

## 2025-03-27 ASSESSMENT — PAIN SCALES - GENERAL: PAINLEVEL_OUTOF10: 5

## 2025-03-27 ASSESSMENT — PAIN DESCRIPTION - LOCATION: LOCATION: GROIN

## 2025-03-27 NOTE — TELEPHONE ENCOUNTER
Jimena with [facility] called in and stated they confirmed with their supervisor that the procedural consent and surgical posting must match. Currently the order for procedure consent states: Robotic repair right inguinal hernia with mesh, excisional left inguinal lymph node biopsy. But it must match the surgical posting that states: ROBOTIC INGUINAL HERNIA REPAIR RIGHT WITH MESH, EXCISIONAL BIOPSY OF LEFT INGUINAL LYMPHADENOPATHY.   Jimena stated if needed, their phone number is [phone].

## 2025-03-27 NOTE — TELEPHONE ENCOUNTER
Patient called in requesting to know when was he supposed to be prescribed pain medication as stated in surgical letter. Advised patient he will be able to  his pain medication from his preferred pharmacy after being discharged from surgery with Dr. London. Patient stated he understood and disconnected.

## 2025-03-27 NOTE — DISCHARGE INSTRUCTIONS
[facility]                   [address] [place] [address]   PRE-ADMISSION TESTING    [phone]     Surgery Date:  4/1/25         INSTRUCTIONS BEFORE YOUR SURGERY   Arrival Time [place] Pre-op staff will call you between 3 and 7pm the day before your surgery with your arrival time. If your surgery is on a Monday, they will call you the Friday before. If it's after 7pm the day prior to surgery and you have not received a time yet, please call [phone].   Where to Check In   Come through the Main Hospital entrance. Take the elevators on the left side of the lobby to the 2nd floor. The admitting desk will be on your right.     Please bring the following items to register:  photo ID, insurance card, co-pay if needed, medical directive, DNR, and/or POA if applicable.     Free  parking is available 7am to 5pm.   Food Drink Alcohol Marijuana    No food or drink (gum, mints, coffee, juice, etc) after midnight the night before surgery.      No alcohol (beer, wine, liquor) or marijuana 24 hours before or after surgery.           You may drink WATER ONLY up until 2 hours prior to your surgery time. Please do not      add anything to your water as this may result in your surgery being postponed.         PRE-OP Medication Instructions      MEDICATIONS TO TAKE THE MORNING OF SURGERY:   Unithyroid                        Medications to STOP 7 Days Before Surgery Non-Steroidal anti-inflammatory Drugs (NSAID's): for example: Ibuprofen, Advil, Motrin, Naproxen, Aleve  Aspirin and Aspirin containing products (BC Powder, Excedrin, etc.)  Weight loss and/or diabetic GLP1 medications (Wegovy, Ozempic, Semaglutide, Trulicity, Mounjaro, Zepbound, Tirzepatide, etc)  Herbal supplements, vitamins, and fish oil  Other:       Pre-op Hygiene     If CHG wash was provided to you at your PAT appointment, start the wash 3 days prior to surgery as instructed. Refer to the handout provided for

## 2025-03-27 NOTE — TELEPHONE ENCOUNTER
Returned call to  Pre-Admission Testing spoke with David and pt verified using 2 patient identifiers. I informed her of the reason for the call and she states that Jimena had left for the day.    Informed will make Dr. London was not in the office but will forward the information to her.

## 2025-03-28 ENCOUNTER — CLINICAL DOCUMENTATION (OUTPATIENT)
Age: 80
End: 2025-03-28

## 2025-03-28 LAB
EKG ATRIAL RATE: 52 BPM
EKG DIAGNOSIS: NORMAL
EKG P AXIS: 71 DEGREES
EKG P-R INTERVAL: 234 MS
EKG Q-T INTERVAL: 446 MS
EKG QRS DURATION: 94 MS
EKG QTC CALCULATION (BAZETT): 414 MS
EKG R AXIS: 72 DEGREES
EKG T AXIS: 71 DEGREES
EKG VENTRICULAR RATE: 52 BPM

## 2025-03-28 PROCEDURE — 93010 ELECTROCARDIOGRAM REPORT: CPT | Performed by: INTERNAL MEDICINE

## 2025-03-31 NOTE — PERIOP NOTE
Hello,     You are scheduled to have surgery tomorrow at [facility].     We would like for you to arrive at  08:00 am  We are located on the second floor, [address]. You will check-in at the registration desk located outside the elevators on the second floor prior to proceeding to [address].  Remember nothing to eat or drink after midnight. If you need to take medications the morning of surgery, please take with a few sips of water.   Wear loose, comfortable clothing and leave all your jewelry at home.   You may bring your cell phone with you.  One family member will be allowed in the pre-op area once you are dressed and your IV has been started.   You will need someone to drive you home and be with you for 24 hours post-anesthesia.     We look forward to seeing you! Call [phone] for questions after hours and [phone] between 5:30AM and 6PM.     Thanks!    [facility] PREOP TEAM

## 2025-04-01 ENCOUNTER — HOSPITAL ENCOUNTER (OUTPATIENT)
Facility: HOSPITAL | Age: 80
Setting detail: OUTPATIENT SURGERY
Discharge: HOME | End: 2025-04-01
Attending: SURGERY | Admitting: SURGERY
Payer: MEDICARE

## 2025-04-01 ENCOUNTER — ANESTHESIA (OUTPATIENT)
Facility: HOSPITAL | Age: 80
End: 2025-04-01
Payer: MEDICARE

## 2025-04-01 ENCOUNTER — ANESTHESIA EVENT (OUTPATIENT)
Facility: HOSPITAL | Age: 80
End: 2025-04-01
Payer: MEDICARE

## 2025-04-01 VITALS
BODY MASS INDEX: 24.67 KG/M2 | DIASTOLIC BLOOD PRESSURE: 67 MMHG | HEART RATE: 61 BPM | OXYGEN SATURATION: 97 % | SYSTOLIC BLOOD PRESSURE: 111 MMHG | RESPIRATION RATE: 12 BRPM | WEIGHT: 186.95 LBS | TEMPERATURE: 97.5 F

## 2025-04-01 DIAGNOSIS — K40.90 NON-RECURRENT UNILATERAL INGUINAL HERNIA WITHOUT OBSTRUCTION OR GANGRENE: ICD-10-CM

## 2025-04-01 DIAGNOSIS — Z48.89 ENCOUNTER FOR POSTOPERATIVE CARE: Primary | ICD-10-CM

## 2025-04-01 PROCEDURE — 3700000000 HC ANESTHESIA ATTENDED CARE: Performed by: SURGERY

## 2025-04-01 PROCEDURE — 2709999900 HC NON-CHARGEABLE SUPPLY: Performed by: SURGERY

## 2025-04-01 PROCEDURE — 88342 IMHCHEM/IMCYTCHM 1ST ANTB: CPT

## 2025-04-01 PROCEDURE — 6360000002 HC RX W HCPCS: Performed by: ANESTHESIOLOGY

## 2025-04-01 PROCEDURE — 7100000011 HC PHASE II RECOVERY - ADDTL 15 MIN: Performed by: SURGERY

## 2025-04-01 PROCEDURE — 6360000002 HC RX W HCPCS: Performed by: SURGERY

## 2025-04-01 PROCEDURE — 88184 FLOWCYTOMETRY/ TC 1 MARKER: CPT

## 2025-04-01 PROCEDURE — 88360 TUMOR IMMUNOHISTOCHEM/MANUAL: CPT

## 2025-04-01 PROCEDURE — 6370000000 HC RX 637 (ALT 250 FOR IP): Performed by: ANESTHESIOLOGY

## 2025-04-01 PROCEDURE — 2500000003 HC RX 250 WO HCPCS: Performed by: SURGERY

## 2025-04-01 PROCEDURE — 88334 PATH CONSLTJ SURG CYTO XM EA: CPT

## 2025-04-01 PROCEDURE — 49650 LAP ING HERNIA REPAIR INIT: CPT | Performed by: SURGERY

## 2025-04-01 PROCEDURE — 2580000003 HC RX 258: Performed by: ANESTHESIOLOGY

## 2025-04-01 PROCEDURE — 88333 PATH CONSLTJ SURG CYTO XM 1: CPT

## 2025-04-01 PROCEDURE — 2500000003 HC RX 250 WO HCPCS: Performed by: NURSE ANESTHETIST, CERTIFIED REGISTERED

## 2025-04-01 PROCEDURE — 88305 TISSUE EXAM BY PATHOLOGIST: CPT

## 2025-04-01 PROCEDURE — 6360000002 HC RX W HCPCS: Performed by: NURSE ANESTHETIST, CERTIFIED REGISTERED

## 2025-04-01 PROCEDURE — 88341 IMHCHEM/IMCYTCHM EA ADD ANTB: CPT

## 2025-04-01 PROCEDURE — 7100000000 HC PACU RECOVERY - FIRST 15 MIN: Performed by: SURGERY

## 2025-04-01 PROCEDURE — 7100000001 HC PACU RECOVERY - ADDTL 15 MIN: Performed by: SURGERY

## 2025-04-01 PROCEDURE — 88185 FLOWCYTOMETRY/TC ADD-ON: CPT

## 2025-04-01 PROCEDURE — C1781 MESH (IMPLANTABLE): HCPCS | Performed by: SURGERY

## 2025-04-01 PROCEDURE — 3700000001 HC ADD 15 MINUTES (ANESTHESIA): Performed by: SURGERY

## 2025-04-01 PROCEDURE — 38531 OPEN BX/EXC INGUINOFEM NODES: CPT | Performed by: SURGERY

## 2025-04-01 PROCEDURE — 3600000009 HC SURGERY ROBOT BASE: Performed by: SURGERY

## 2025-04-01 PROCEDURE — 3600000019 HC SURGERY ROBOT ADDTL 15MIN: Performed by: SURGERY

## 2025-04-01 PROCEDURE — S2900 ROBOTIC SURGICAL SYSTEM: HCPCS | Performed by: SURGERY

## 2025-04-01 PROCEDURE — 7100000010 HC PHASE II RECOVERY - FIRST 15 MIN: Performed by: SURGERY

## 2025-04-01 DEVICE — LAPAROSCOPIC SELF-FIXATING MESH, RIGHT ANATOMICAL
Type: IMPLANTABLE DEVICE | Status: FUNCTIONAL
Brand: PROGRIP

## 2025-04-01 RX ORDER — IPRATROPIUM BROMIDE AND ALBUTEROL SULFATE 2.5; .5 MG/3ML; MG/3ML
1 SOLUTION RESPIRATORY (INHALATION)
Status: DISCONTINUED | OUTPATIENT
Start: 2025-04-01 | End: 2025-04-01 | Stop reason: HOSPADM

## 2025-04-01 RX ORDER — KETOROLAC TROMETHAMINE 30 MG/ML
INJECTION, SOLUTION INTRAMUSCULAR; INTRAVENOUS
Status: DISCONTINUED | OUTPATIENT
Start: 2025-04-01 | End: 2025-04-01 | Stop reason: SDUPTHER

## 2025-04-01 RX ORDER — PHENYLEPHRINE HCL IN 0.9% NACL 0.4MG/10ML
SYRINGE (ML) INTRAVENOUS
Status: DISCONTINUED | OUTPATIENT
Start: 2025-04-01 | End: 2025-04-01 | Stop reason: SDUPTHER

## 2025-04-01 RX ORDER — PROPOFOL 10 MG/ML
INJECTION, EMULSION INTRAVENOUS
Status: DISCONTINUED | OUTPATIENT
Start: 2025-04-01 | End: 2025-04-01 | Stop reason: SDUPTHER

## 2025-04-01 RX ORDER — SODIUM CHLORIDE 9 MG/ML
INJECTION, SOLUTION INTRAVENOUS CONTINUOUS
Status: DISCONTINUED | OUTPATIENT
Start: 2025-04-01 | End: 2025-04-01 | Stop reason: HOSPADM

## 2025-04-01 RX ORDER — HYDROMORPHONE HYDROCHLORIDE 1 MG/ML
1 INJECTION, SOLUTION INTRAMUSCULAR; INTRAVENOUS; SUBCUTANEOUS EVERY 5 MIN PRN
Status: DISCONTINUED | OUTPATIENT
Start: 2025-04-01 | End: 2025-04-01 | Stop reason: HOSPADM

## 2025-04-01 RX ORDER — OXYCODONE HYDROCHLORIDE 5 MG/1
5 TABLET ORAL EVERY 6 HOURS PRN
Qty: 12 TABLET | Refills: 0 | Status: SHIPPED | OUTPATIENT
Start: 2025-04-01 | End: 2025-04-04

## 2025-04-01 RX ORDER — NALOXONE HYDROCHLORIDE 0.4 MG/ML
INJECTION, SOLUTION INTRAMUSCULAR; INTRAVENOUS; SUBCUTANEOUS PRN
Status: DISCONTINUED | OUTPATIENT
Start: 2025-04-01 | End: 2025-04-01 | Stop reason: HOSPADM

## 2025-04-01 RX ORDER — ALBUTEROL SULFATE 0.83 MG/ML
2.5 SOLUTION RESPIRATORY (INHALATION)
Status: DISCONTINUED | OUTPATIENT
Start: 2025-04-01 | End: 2025-04-01 | Stop reason: HOSPADM

## 2025-04-01 RX ORDER — ACETAMINOPHEN 325 MG/1
650 TABLET ORAL ONCE
Status: COMPLETED | OUTPATIENT
Start: 2025-04-01 | End: 2025-04-01

## 2025-04-01 RX ORDER — ROCURONIUM BROMIDE 10 MG/ML
INJECTION, SOLUTION INTRAVENOUS
Status: DISCONTINUED | OUTPATIENT
Start: 2025-04-01 | End: 2025-04-01 | Stop reason: SDUPTHER

## 2025-04-01 RX ORDER — FENTANYL CITRATE 50 UG/ML
INJECTION, SOLUTION INTRAMUSCULAR; INTRAVENOUS
Status: DISCONTINUED | OUTPATIENT
Start: 2025-04-01 | End: 2025-04-01 | Stop reason: SDUPTHER

## 2025-04-01 RX ORDER — LABETALOL HYDROCHLORIDE 5 MG/ML
10 INJECTION, SOLUTION INTRAVENOUS
Status: DISCONTINUED | OUTPATIENT
Start: 2025-04-01 | End: 2025-04-01 | Stop reason: HOSPADM

## 2025-04-01 RX ORDER — SUCCINYLCHOLINE CHLORIDE 20 MG/ML
INJECTION INTRAMUSCULAR; INTRAVENOUS
Status: DISCONTINUED | OUTPATIENT
Start: 2025-04-01 | End: 2025-04-01 | Stop reason: SDUPTHER

## 2025-04-01 RX ORDER — GLYCOPYRROLATE 0.2 MG/ML
INJECTION INTRAMUSCULAR; INTRAVENOUS
Status: DISCONTINUED | OUTPATIENT
Start: 2025-04-01 | End: 2025-04-01 | Stop reason: SDUPTHER

## 2025-04-01 RX ORDER — OXYCODONE HYDROCHLORIDE 5 MG/1
5 TABLET ORAL
Status: COMPLETED | OUTPATIENT
Start: 2025-04-01 | End: 2025-04-01

## 2025-04-01 RX ORDER — BUPIVACAINE HYDROCHLORIDE 5 MG/ML
INJECTION, SOLUTION EPIDURAL; INTRACAUDAL PRN
Status: DISCONTINUED | OUTPATIENT
Start: 2025-04-01 | End: 2025-04-01 | Stop reason: HOSPADM

## 2025-04-01 RX ORDER — DROPERIDOL 2.5 MG/ML
0.62 INJECTION, SOLUTION INTRAMUSCULAR; INTRAVENOUS
Status: DISCONTINUED | OUTPATIENT
Start: 2025-04-01 | End: 2025-04-01 | Stop reason: HOSPADM

## 2025-04-01 RX ORDER — SODIUM CHLORIDE 0.9 % (FLUSH) 0.9 %
5-40 SYRINGE (ML) INJECTION PRN
Status: DISCONTINUED | OUTPATIENT
Start: 2025-04-01 | End: 2025-04-01 | Stop reason: HOSPADM

## 2025-04-01 RX ORDER — EPHEDRINE SULFATE/0.9% NACL/PF 25 MG/5 ML
SYRINGE (ML) INTRAVENOUS
Status: DISCONTINUED | OUTPATIENT
Start: 2025-04-01 | End: 2025-04-01 | Stop reason: SDUPTHER

## 2025-04-01 RX ORDER — MEPERIDINE HYDROCHLORIDE 25 MG/ML
12.5 INJECTION INTRAMUSCULAR; INTRAVENOUS; SUBCUTANEOUS EVERY 5 MIN PRN
Status: DISCONTINUED | OUTPATIENT
Start: 2025-04-01 | End: 2025-04-01 | Stop reason: HOSPADM

## 2025-04-01 RX ORDER — DIPHENHYDRAMINE HYDROCHLORIDE 50 MG/ML
12.5 INJECTION, SOLUTION INTRAMUSCULAR; INTRAVENOUS
Status: DISCONTINUED | OUTPATIENT
Start: 2025-04-01 | End: 2025-04-01 | Stop reason: HOSPADM

## 2025-04-01 RX ORDER — LIDOCAINE HYDROCHLORIDE 10 MG/ML
1 INJECTION, SOLUTION EPIDURAL; INFILTRATION; INTRACAUDAL; PERINEURAL
Status: DISCONTINUED | OUTPATIENT
Start: 2025-04-01 | End: 2025-04-01 | Stop reason: HOSPADM

## 2025-04-01 RX ORDER — SODIUM CHLORIDE 0.9 % (FLUSH) 0.9 %
5-40 SYRINGE (ML) INJECTION EVERY 12 HOURS SCHEDULED
Status: DISCONTINUED | OUTPATIENT
Start: 2025-04-01 | End: 2025-04-01 | Stop reason: HOSPADM

## 2025-04-01 RX ORDER — OXYCODONE HYDROCHLORIDE 5 MG/1
5 TABLET ORAL EVERY 6 HOURS PRN
Qty: 12 TABLET | Refills: 0 | Status: SHIPPED | OUTPATIENT
Start: 2025-04-01 | End: 2025-04-01

## 2025-04-01 RX ORDER — DEXMEDETOMIDINE HYDROCHLORIDE 100 UG/ML
INJECTION, SOLUTION INTRAVENOUS
Status: DISCONTINUED | OUTPATIENT
Start: 2025-04-01 | End: 2025-04-01 | Stop reason: SDUPTHER

## 2025-04-01 RX ORDER — DEXAMETHASONE SODIUM PHOSPHATE 4 MG/ML
INJECTION, SOLUTION INTRA-ARTICULAR; INTRALESIONAL; INTRAMUSCULAR; INTRAVENOUS; SOFT TISSUE
Status: DISCONTINUED | OUTPATIENT
Start: 2025-04-01 | End: 2025-04-01 | Stop reason: SDUPTHER

## 2025-04-01 RX ORDER — SODIUM CHLORIDE 9 MG/ML
INJECTION, SOLUTION INTRAVENOUS PRN
Status: DISCONTINUED | OUTPATIENT
Start: 2025-04-01 | End: 2025-04-01 | Stop reason: HOSPADM

## 2025-04-01 RX ORDER — ONDANSETRON 2 MG/ML
INJECTION INTRAMUSCULAR; INTRAVENOUS
Status: DISCONTINUED | OUTPATIENT
Start: 2025-04-01 | End: 2025-04-01 | Stop reason: SDUPTHER

## 2025-04-01 RX ADMIN — ACETAMINOPHEN 650 MG: 325 TABLET ORAL at 09:01

## 2025-04-01 RX ADMIN — LIDOCAINE HYDROCHLORIDE 100 MG: 20 INJECTION, SOLUTION EPIDURAL; INFILTRATION; INTRACAUDAL; PERINEURAL at 10:18

## 2025-04-01 RX ADMIN — PROPOFOL 100 MG: 10 INJECTION, EMULSION INTRAVENOUS at 10:18

## 2025-04-01 RX ADMIN — WATER 2000 MG: 1 INJECTION INTRAMUSCULAR; INTRAVENOUS; SUBCUTANEOUS at 10:48

## 2025-04-01 RX ADMIN — Medication 120 MCG: at 11:49

## 2025-04-01 RX ADMIN — ROCURONIUM BROMIDE 5 MG: 10 INJECTION INTRAVENOUS at 10:18

## 2025-04-01 RX ADMIN — DEXMEDETOMIDINE 10 MCG: 100 INJECTION, SOLUTION INTRAVENOUS at 10:09

## 2025-04-01 RX ADMIN — ROCURONIUM BROMIDE 45 MG: 10 INJECTION INTRAVENOUS at 10:30

## 2025-04-01 RX ADMIN — KETOROLAC TROMETHAMINE 15 MG: 30 INJECTION, SOLUTION INTRAMUSCULAR at 12:01

## 2025-04-01 RX ADMIN — SODIUM CHLORIDE: 9 INJECTION, SOLUTION INTRAVENOUS at 10:09

## 2025-04-01 RX ADMIN — HYDROMORPHONE HYDROCHLORIDE 0.5 MG: 1 INJECTION, SOLUTION INTRAMUSCULAR; INTRAVENOUS; SUBCUTANEOUS at 12:18

## 2025-04-01 RX ADMIN — DEXAMETHASONE SODIUM PHOSPHATE 4 MG: 4 INJECTION INTRA-ARTICULAR; INTRALESIONAL; INTRAMUSCULAR; INTRAVENOUS; SOFT TISSUE at 10:55

## 2025-04-01 RX ADMIN — ONDANSETRON 4 MG: 2 INJECTION, SOLUTION INTRAMUSCULAR; INTRAVENOUS at 11:59

## 2025-04-01 RX ADMIN — FENTANYL CITRATE 50 MCG: 50 INJECTION, SOLUTION INTRAMUSCULAR; INTRAVENOUS at 10:09

## 2025-04-01 RX ADMIN — GLYCOPYRROLATE 0.2 MG: 0.2 INJECTION INTRAMUSCULAR; INTRAVENOUS at 10:41

## 2025-04-01 RX ADMIN — SUGAMMADEX 170 MG: 100 INJECTION, SOLUTION INTRAVENOUS at 12:02

## 2025-04-01 RX ADMIN — EPHEDRINE SULFATE 10 MG: 5 INJECTION INTRAVENOUS at 11:47

## 2025-04-01 RX ADMIN — EPHEDRINE SULFATE 10 MG: 5 INJECTION INTRAVENOUS at 10:36

## 2025-04-01 RX ADMIN — EPHEDRINE SULFATE 5 MG: 5 INJECTION INTRAVENOUS at 10:39

## 2025-04-01 RX ADMIN — SODIUM CHLORIDE: 0.9 INJECTION, SOLUTION INTRAVENOUS at 09:01

## 2025-04-01 RX ADMIN — FENTANYL CITRATE 50 MCG: 50 INJECTION, SOLUTION INTRAMUSCULAR; INTRAVENOUS at 10:14

## 2025-04-01 RX ADMIN — SODIUM CHLORIDE: 9 INJECTION, SOLUTION INTRAVENOUS at 12:11

## 2025-04-01 RX ADMIN — SUCCINYLCHOLINE CHLORIDE 100 MG: 20 INJECTION, SOLUTION INTRAMUSCULAR; INTRAVENOUS at 10:18

## 2025-04-01 RX ADMIN — OXYCODONE 5 MG: 5 TABLET ORAL at 13:13

## 2025-04-01 ASSESSMENT — PAIN DESCRIPTION - ORIENTATION
ORIENTATION: ANTERIOR
ORIENTATION: RIGHT

## 2025-04-01 ASSESSMENT — PAIN SCALES - GENERAL
PAINLEVEL_OUTOF10: 3
PAINLEVEL_OUTOF10: 10
PAINLEVEL_OUTOF10: 2
PAINLEVEL_OUTOF10: 4

## 2025-04-01 ASSESSMENT — PAIN DESCRIPTION - DESCRIPTORS
DESCRIPTORS: ACHING
DESCRIPTORS: ACHING;BURNING
DESCRIPTORS: SHARP

## 2025-04-01 ASSESSMENT — PAIN DESCRIPTION - LOCATION
LOCATION: ABDOMEN
LOCATION: ABDOMEN;GROIN

## 2025-04-01 ASSESSMENT — PAIN DESCRIPTION - FREQUENCY: FREQUENCY: CONTINUOUS

## 2025-04-01 ASSESSMENT — PAIN DESCRIPTION - PAIN TYPE: TYPE: SURGICAL PAIN

## 2025-04-01 ASSESSMENT — PAIN DESCRIPTION - ONSET: ONSET: SUDDEN

## 2025-04-01 ASSESSMENT — PAIN - FUNCTIONAL ASSESSMENT: PAIN_FUNCTIONAL_ASSESSMENT: 0-10

## 2025-04-01 NOTE — ANESTHESIA POSTPROCEDURE EVALUATION
Department of Anesthesiology  Postprocedure Note    Patient: Thien Finn  MRN: [id number]  YOB: 1945  Date of evaluation: 4/1/2025    Procedure Summary       Date: 04/01/25 Room / Location: [facility] MAIN OR  / [facility] MAIN OR    Anesthesia Start: 1009 Anesthesia Stop: 1211    Procedure: ROBOTIC INGUINAL HERNIA REPAIR RIGHT WITH MESH, EXCISIONAL BIOPSY OF LEFT INGUINAL LYMPHADENOPATHY (Right) Diagnosis:       Non-recurrent unilateral inguinal hernia without obstruction or gangrene      Inguinal lymphadenopathy      (Non-recurrent unilateral inguinal hernia without obstruction or gangrene [K40.90])      (Inguinal lymphadenopathy [R59.0])    Surgeons: Sushila London MD Responsible Provider: Javier Cantrell DO    Anesthesia Type: General ASA Status: 2            Anesthesia Type: General    Joe Phase I: Joe Score: 9    Joe Phase II:      Anesthesia Post Evaluation    Patient location during evaluation: PACU  Patient participation: complete - patient participated  Level of consciousness: awake  Airway patency: patent  Nausea & Vomiting: no vomiting and no nausea  Cardiovascular status: hemodynamically stable  Respiratory status: acceptable  Hydration status: stable  Comments: Patient seen and examined.  Ready for discharge from PACU.  Multimodal analgesia pain management approach  Pain management: adequate    No notable events documented.

## 2025-04-01 NOTE — DISCHARGE INSTRUCTIONS
P O S T-OP E R AT I V E I N S T RU C T I O N S  OU T PAT I E N T S U RG E R Y HE R N I A RE PA I R AND LYMPH NODE EXCISION    Today you underwent a hernia repair and lymph node excision which is usually well tolerated. However, below is a list of instructions which are important for you to follow. If you have questions, please feel free to call your surgeon’s office.    1 EATING: Please eat lightly when you go home today for the first 24 hours. You may resume your regular diet after that.    2 EXERCISE: Limit your exercise for the first week, although stairs or other walking is fine.    3. DRESSING:You may shower 1 day. No baths or pools for 2 weeks    4. NO LIFTING: No lifting >10lbs for 4 weeks from surgery date.                           If you were given a binder in the hospital, wear it during the day for 4 weeks from surgery date.  Do not need to wear it to sleep    5. DRIVING: No driving for 5-7 days but you may ride as a passenger anytime.  No driving if taking narcotics.    6. PAIN: A prescription for pain has been given to you or your family. Please use it as prescribed and if this is inadequate, please call your surgeon’s office. In some cases, Tylenol or Advil may be adequate; and in that case, you will not need to fill the prescription. Pain medication may cause constipation - Colace or Milk of Magnesia may be used as needed.  May take Tylenol 1000mg every 6 hours as needed for pain  May take Ibuprofen 600-800mg every 6 hours as needed for pain      7 WOUND: If you notice any increased redness, swelling, pain, or fever, please call the office. If this is noticed at a time after normal office hours, please call our answering service.    8. URINATION: If unable to void (unable to pass your water) please return to the hospital emergency department.    9. DISCOLORATION: Do not be alarmed by black or bluish discoloration of your anatomy. This may extent to the scrotum or labia. This will be due to blood  Medical device information sheets/pamphlets from their    []     School/work excuse note.  []     /parent work excuse note.      The following personal items collected during your admission are returned to you:   Dental Appliance:    Vision:    Hearing Aid:    Jewelry:    Clothing:    Other Valuables:

## 2025-04-01 NOTE — BRIEF OP NOTE
Brief Postoperative Note      Patient: Thien Finn  YOB: 1945  MRN: [id number]    Date of Procedure: 4/1/2025    Pre-Op Diagnosis Codes:      * Non-recurrent unilateral inguinal hernia without obstruction or gangrene [K40.90]     * Inguinal lymphadenopathy [R59.0]  Right inguinal hernia, left inguinal lymphadenopathy    Post-Op Diagnosis: Same       Procedure(s):  ROBOTIC INGUINAL HERNIA REPAIR RIGHT WITH MESH, EXCISIONAL BIOPSY OF LEFT INGUINAL LYMPHADENOPATHY    Surgeon(s):  Sushila London MD    Assistant:  Surgical Assistant: Blas Herrera Assistant: Devon Bellamy PA    Anesthesia: General    Estimated Blood Loss (mL): Minimal    Complications: None    Specimens:   ID Type Source Tests Collected by Time Destination   1 : 1) left inguinal lymph node, rule out lymphoma Tissue Lymph Node SURGICAL PATHOLOGY Sushila London MD 4/1/2025 1146        Implants:  Implant Name Type Inv. Item Serial No.  Lot No. LRB No. Used Action   MESH MANUEL [id number] TEXTILE MFIL POLYETH TEREPHTHALATE - SNA  MESH MANUEL [id number] TEXTILE MFIL POLYETH TEREPHTHALATE NA [employer]  SURGICAL-WD [id number] Right 1 Implanted         Drains: none    Findings:  Infection Present At Time Of Surgery (PATOS) (choose all levels that have infection present):  No infection present  Other Findings: Large right indirect inguinal hernia, lipoma of the cord reduced  Left and large inguinal lymph node identified and resected    Electronically signed by Sushila London MD on 4/1/2025 at 12:00 PM

## 2025-04-01 NOTE — OP NOTE
OPERATIVE REPORT     PREOPERATIVE DIAGNOSIS: Reducible right inguinal hernia, nonrecurrent.  Left inguinal lymphadenopathy    POSTOPERATIVE DIAGNOSIS: Same    PROCEDURES PERFORMED: Laparoscopic robotic assisted repair of right inguinal hernia (transabdominal pre-peritoneal) , excisional biopsy of left inguinal lymphadenopathy    SURGEON: Sushila London MD     ANESTHESIA: General, ET    INDICATION: As documented in history and physical.     FINDINGS:  Large right indirect inguinal hernia, lipoma of the cord reduced  Left and large inguinal lymph node identified and resected    ASSISTANT: None       DESCRIPTION OF PROCEDURE:   The patient was taken to the operating room and   placed in supine position. Following general anesthetic induction and   endotracheal intubation, his abdomen was prepped and draped in the   usual sterile fashion.   A supraumbilical incision was made. A Veress needle was passed without difficulty. The initial intraabdominal pressures were low, and the abdomen was insufflated   with carbon dioxide gas to a pressure of 15 mmHg. An 8.5 mm robotic trocar   was inserted. An 8.5 mm robotic laparoscope was introduced. The   abdomen was then surveyed. There was no evidence of inadvertent   intraabdominal injury.We then placed an 8 mm robotic trocar in the right upper quadrant and another 8 mm robotic trocar in the left upper quadrant under direct visulaization. The patient was placed in Trendelenburg.   The robot was then brought in and docked.  Once the robot was docked, laparoscope was mounted.   For my right hand, we placed the monopolar scissors. For my left hand, the   Cadiere grasper was used. I then went to the surgeon's console. Inspection   of the right side identified hernia. The peritoneal flap was then created with the monopolar   scissors. This was done from approximately the level of the    anterior-superior iliac spine on the right side, extending the incision   medially beyond the

## 2025-04-01 NOTE — ANESTHESIA PRE PROCEDURE
Department of Anesthesiology  Preprocedure Note       Name:  Thien Finn   Age:  79 y.o.  :  1945                                          MRN:  [id number]         Date:  2025      Surgeon: Surgeon(s):  Sushila London MD    Procedure: Procedure(s):  ROBOTIC INGUINAL HERNIA REPAIR RIGHT WITH MESH, EXCISIONAL BIOPSY OF LEFT INGUINAL LYMPHADENOPATHY    Medications prior to admission:   Prior to Admission medications    Medication Sig Start Date End Date Taking? Authorizing Provider   oxyCODONE (ROXICODONE) 5 MG immediate release tablet Take 1 tablet by mouth every 6 hours as needed for Pain for up to 3 days. Intended supply: 3 days. Take lowest dose possible to manage pain Max Daily Amount: 20 mg 25 Yes Devon Bellamy PA   UNITHROID 150 MCG tablet Take 1 tablet by mouth Daily Increased 3/23/25 3/23/25  Yes Celestine Littlejohn MD   tamsulosin (FLOMAX) 0.4 MG capsule TAKE 1 CAPSULE BY MOUTH DAILY 24  Yes Celestine Littlejohn MD   finasteride (PROSCAR) 5 MG tablet TAKE 1 TABLET BY MOUTH DAILY 24  Yes Celestine Littlejohn MD   carvedilol (COREG) 6.25 MG tablet Take 1 tablet by mouth nightly 22  Yes Automatic Reconciliation, Ar   clonazePAM (KLONOPIN) 0.5 MG tablet Take 1 tablet by mouth nightly.   Yes Automatic Reconciliation, Ar   ferrous sulfate (SLOW FE) 142 (45 Fe) MG extended release tablet Take 142 mg by mouth every morning (before breakfast) 10/20/22  Yes Automatic Reconciliation, Ar   Multiple Vitamins-Minerals (MULTIVITAL PO) Take 1 tablet by mouth daily    Provider, MD Godfrey   Cholecalciferol 50 MCG (2000) CAPS Take 1 capsule by mouth daily 10/20/22   Automatic Reconciliation, Ar   cyanocobalamin 1000 MCG tablet Take 1 tablet by mouth daily    Automatic Reconciliation, Ar       Current medications:    Current Facility-Administered Medications   Medication Dose Route Frequency Provider Last Rate Last Admin   • lidocaine PF 1 % injection 1 mL  1 mL IntraDERmal Once PRN

## 2025-04-10 ENCOUNTER — HOSPITAL ENCOUNTER (OUTPATIENT)
Facility: HOSPITAL | Age: 80
Discharge: HOME | End: 2025-04-13
Attending: INTERNAL MEDICINE
Payer: MEDICARE

## 2025-04-10 VITALS — BODY MASS INDEX: 24.54 KG/M2 | WEIGHT: 186 LBS

## 2025-04-10 DIAGNOSIS — K76.9 LIVER LESION: ICD-10-CM

## 2025-04-10 DIAGNOSIS — K75.81 METABOLIC DYSFUNCTION-ASSOCIATED STEATOHEPATITIS (MASH): ICD-10-CM

## 2025-04-10 DIAGNOSIS — R16.0 LIVER MASS: ICD-10-CM

## 2025-04-10 LAB
ALBUMIN SERPL-MCNC: 3 G/DL (ref 3.5–5)
ALBUMIN/GLOB SERPL: 0.9 (ref 1.1–2.2)
ALP SERPL-CCNC: 162 U/L (ref 45–117)
ALT SERPL-CCNC: 56 U/L (ref 12–78)
ANION GAP SERPL CALC-SCNC: 2 MMOL/L (ref 2–12)
AST SERPL-CCNC: 66 U/L (ref 15–37)
BASOPHILS # BLD: 0.02 K/UL (ref 0–0.1)
BASOPHILS NFR BLD: 0.7 % (ref 0–1)
BILIRUB DIRECT SERPL-MCNC: 0.6 MG/DL (ref 0–0.2)
BILIRUB SERPL-MCNC: 1.2 MG/DL (ref 0.2–1)
BUN SERPL-MCNC: 16 MG/DL (ref 6–20)
BUN/CREAT SERPL: 18 (ref 12–20)
CALCIUM SERPL-MCNC: 9 MG/DL (ref 8.5–10.1)
CHLORIDE SERPL-SCNC: 110 MMOL/L (ref 97–108)
CO2 SERPL-SCNC: 28 MMOL/L (ref 21–32)
CREAT SERPL-MCNC: 0.88 MG/DL (ref 0.7–1.3)
DIFFERENTIAL METHOD BLD: ABNORMAL
EOSINOPHIL # BLD: 0.2 K/UL (ref 0–0.4)
EOSINOPHIL NFR BLD: 7.2 % (ref 0–7)
ERYTHROCYTE [DISTWIDTH] IN BLOOD BY AUTOMATED COUNT: 14.3 % (ref 11.5–14.5)
GLOBULIN SER CALC-MCNC: 3.2 G/DL (ref 2–4)
GLUCOSE SERPL-MCNC: 107 MG/DL (ref 65–100)
HCT VFR BLD AUTO: 35.7 % (ref 36.6–50.3)
HGB BLD-MCNC: 12 G/DL (ref 12.1–17)
IMM GRANULOCYTES # BLD AUTO: 0.01 K/UL (ref 0–0.04)
IMM GRANULOCYTES NFR BLD AUTO: 0.4 % (ref 0–0.5)
INR PPP: 1.1 (ref 0.9–1.1)
LYMPHOCYTES # BLD: 0.66 K/UL (ref 0.8–3.5)
LYMPHOCYTES NFR BLD: 23.7 % (ref 12–49)
MCH RBC QN AUTO: 33.2 PG (ref 26–34)
MCHC RBC AUTO-ENTMCNC: 33.6 G/DL (ref 30–36.5)
MCV RBC AUTO: 98.9 FL (ref 80–99)
MONOCYTES # BLD: 0.36 K/UL (ref 0–1)
MONOCYTES NFR BLD: 12.9 % (ref 5–13)
NEUTS SEG # BLD: 1.55 K/UL (ref 1.8–8)
NEUTS SEG NFR BLD: 55.1 % (ref 32–75)
NRBC # BLD: 0 K/UL (ref 0–0.01)
NRBC BLD-RTO: 0 PER 100 WBC
PLATELET # BLD AUTO: 90 K/UL (ref 150–400)
PMV BLD AUTO: 10.3 FL (ref 8.9–12.9)
POTASSIUM SERPL-SCNC: 4.1 MMOL/L (ref 3.5–5.1)
PROT SERPL-MCNC: 6.2 G/DL (ref 6.4–8.2)
PROTHROMBIN TIME: 11.9 SEC (ref 9.2–11.2)
RBC # BLD AUTO: 3.61 M/UL (ref 4.1–5.7)
RBC MORPH BLD: ABNORMAL
SODIUM SERPL-SCNC: 140 MMOL/L (ref 136–145)
WBC # BLD AUTO: 2.8 K/UL (ref 4.1–11.1)
WBC MORPH BLD: ABNORMAL

## 2025-04-10 PROCEDURE — 74183 MRI ABD W/O CNTR FLWD CNTR: CPT

## 2025-04-10 PROCEDURE — 6360000004 HC RX CONTRAST MEDICATION: Performed by: RADIOLOGY

## 2025-04-10 PROCEDURE — A9575 INJ GADOTERATE MEGLUMI 0.1ML: HCPCS | Performed by: RADIOLOGY

## 2025-04-10 RX ORDER — GADOTERATE MEGLUMINE 376.9 MG/ML
16 INJECTION INTRAVENOUS
Status: COMPLETED | OUTPATIENT
Start: 2025-04-10 | End: 2025-04-10

## 2025-04-10 RX ADMIN — GADOTERATE MEGLUMINE 16 ML: 376.9 INJECTION INTRAVENOUS at 14:09

## 2025-04-11 ENCOUNTER — RESULTS FOLLOW-UP (OUTPATIENT)
Age: 80
End: 2025-04-11

## 2025-04-17 ENCOUNTER — OFFICE VISIT (OUTPATIENT)
Age: 80
End: 2025-04-17

## 2025-04-17 VITALS
OXYGEN SATURATION: 97 % | HEART RATE: 63 BPM | WEIGHT: 187.2 LBS | SYSTOLIC BLOOD PRESSURE: 123 MMHG | TEMPERATURE: 97.7 F | RESPIRATION RATE: 16 BRPM | DIASTOLIC BLOOD PRESSURE: 75 MMHG | HEIGHT: 73 IN | BODY MASS INDEX: 24.81 KG/M2

## 2025-04-17 DIAGNOSIS — Z48.89 POSTOPERATIVE VISIT: Primary | ICD-10-CM

## 2025-04-17 PROCEDURE — 99024 POSTOP FOLLOW-UP VISIT: CPT | Performed by: SURGERY

## 2025-04-17 ASSESSMENT — PATIENT HEALTH QUESTIONNAIRE - PHQ9
SUM OF ALL RESPONSES TO PHQ QUESTIONS 1-9: 0
1. LITTLE INTEREST OR PLEASURE IN DOING THINGS: NOT AT ALL
2. FEELING DOWN, DEPRESSED OR HOPELESS: NOT AT ALL

## 2025-04-17 NOTE — PROGRESS NOTES
Identified pt with two pt identifiers (name and ). Reviewed chart in preparation for visit and have obtained necessary documentation.    Thien Finn is a 79 y.o. male  Chief Complaint   Patient presents with    Post-Op Check     Postop right inguinal hernia repair and lymph node removal from left side 2025     /75 (BP Site: Left Upper Arm, Patient Position: Sitting, BP Cuff Size: Large Adult)   Pulse 63   Temp 97.7 °F (36.5 °C) (Oral)   Resp 16   Ht 1.854 m (6' 1\")   Wt 84.9 kg (187 lb 3.2 oz)   SpO2 97%   BMI 24.70 kg/m²     1. Have you been to the ER, urgent care clinic since your last visit?  Hospitalized since your last visit?no    2. Have you seen or consulted any other health care providers outside of the [facility] since your last visit?  Include any pap smears or colon screening. no

## 2025-04-17 NOTE — PROGRESS NOTES
Surgery Progress Note    4/17/2025    had concerns including Post-Op Check (Postop right inguinal hernia repair and lymph node removal from left side 04/1/2025).     Subjective:     Patient is a 79 y.o. male status post robotic repair right inguinal hernia with mesh and excisional biopsy of left inguinal lymph node.  Patient reports doing well.  Denies any nausea or vomiting.  His concern is that he has more swelling on his left groin than his right.  He has  a follow-up appointment with Dr. Gunter soon to discuss the pathology      Past Medical History:   Diagnosis Date    Abdominal lymphadenopathy 2002    Dr. Hearn.  lymph node enlargement near pancreas.  EUS biopsy 1/22/02 benign, but \"possible pneumonia\"    B12 deficiency     Cataract     Dr. Arriaza    Chronic anxiety     Coronary artery calcification seen on CT scan 04/11/2016    patient with LOW cholesterol.  A.  EBT (10/4/10):  LM 8, , LCx 0, .  Total = 331.    Degenerative joint disease (DJD) of lumbar spine     MRI 3/2017 shows moderate disease and mild stenosis    Environmental allergies     Dr. Weber. also med allergies ibuprofen    Esophageal varices     Essential tremor     FH: colon cancer     mother    Hepatitis A     as child 6    Hypospadias     Hypothyroidism 04/11/2016    saw Dr. Madera int he past    IFG (impaired fasting glucose) 04/06/2016    a1c 6.7%    Liver cirrhosis secondary to JOHNSON (nonalcoholic steatohepatitis) (HCC) 03/22/2023    Dr. Fuentes    Lymphadenopathy 2002    left axillary biopsy 2/14/02 - benign.  Dr. Hearn, Dr. Ray    Mononucleosis     14 yrs old    Normal cardiac stress test 04/2016    Dr. Bennett    Obstructive sleep apnea     on CPAP.  Dr. Tejada    Pancytopenia     Dr King.  s/p marrow biopsy 11/2020    Plantar fasciitis     Dr. Su.  hx R cortisone injection    Prostatic enlargement 12/06/2017    Pulmonary emphysema (HCC) 2020    PFTS 2020 showed mild hyperinflation and obstructive defect.    PVC (premature

## 2025-04-23 LAB
AFP L3 MFR SERPL: NORMAL % (ref 0–9.9)
AFP SERPL-MCNC: 3.2 NG/ML (ref 0–6.4)

## 2025-04-25 ENCOUNTER — OFFICE VISIT (OUTPATIENT)
Age: 80
End: 2025-04-25
Payer: MEDICARE

## 2025-04-25 VITALS
HEART RATE: 60 BPM | TEMPERATURE: 98.5 F | DIASTOLIC BLOOD PRESSURE: 62 MMHG | HEIGHT: 73 IN | BODY MASS INDEX: 24.78 KG/M2 | SYSTOLIC BLOOD PRESSURE: 121 MMHG | OXYGEN SATURATION: 96 % | WEIGHT: 187 LBS

## 2025-04-25 DIAGNOSIS — C22.0 HEPATOCELLULAR CARCINOMA (HCC): Primary | ICD-10-CM

## 2025-04-25 PROCEDURE — G8427 DOCREV CUR MEDS BY ELIG CLIN: HCPCS | Performed by: INTERNAL MEDICINE

## 2025-04-25 PROCEDURE — G8420 CALC BMI NORM PARAMETERS: HCPCS | Performed by: INTERNAL MEDICINE

## 2025-04-25 PROCEDURE — 1123F ACP DISCUSS/DSCN MKR DOCD: CPT | Performed by: INTERNAL MEDICINE

## 2025-04-25 PROCEDURE — 1036F TOBACCO NON-USER: CPT | Performed by: INTERNAL MEDICINE

## 2025-04-25 PROCEDURE — 99215 OFFICE O/P EST HI 40 MIN: CPT | Performed by: INTERNAL MEDICINE

## 2025-04-25 PROCEDURE — 1159F MED LIST DOCD IN RCRD: CPT | Performed by: INTERNAL MEDICINE

## 2025-04-25 ASSESSMENT — PATIENT HEALTH QUESTIONNAIRE - PHQ9
SUM OF ALL RESPONSES TO PHQ QUESTIONS 1-9: 0
SUM OF ALL RESPONSES TO PHQ QUESTIONS 1-9: 0
1. LITTLE INTEREST OR PLEASURE IN DOING THINGS: NOT AT ALL
SUM OF ALL RESPONSES TO PHQ QUESTIONS 1-9: 0
SUM OF ALL RESPONSES TO PHQ QUESTIONS 1-9: 0
2. FEELING DOWN, DEPRESSED OR HOPELESS: NOT AT ALL
DEPRESSION UNABLE TO ASSESS: FUNCTIONAL CAPACITY MOTIVATION LIMITS ACCURACY

## 2025-04-25 ASSESSMENT — ANXIETY QUESTIONNAIRES
7. FEELING AFRAID AS IF SOMETHING AWFUL MIGHT HAPPEN: NOT AT ALL
1. FEELING NERVOUS, ANXIOUS, OR ON EDGE: NOT AT ALL
GAD7 TOTAL SCORE: 0
3. WORRYING TOO MUCH ABOUT DIFFERENT THINGS: NOT AT ALL
IF YOU CHECKED OFF ANY PROBLEMS ON THIS QUESTIONNAIRE, HOW DIFFICULT HAVE THESE PROBLEMS MADE IT FOR YOU TO DO YOUR WORK, TAKE CARE OF THINGS AT HOME, OR GET ALONG WITH OTHER PEOPLE: NOT DIFFICULT AT ALL
6. BECOMING EASILY ANNOYED OR IRRITABLE: NOT AT ALL
4. TROUBLE RELAXING: NOT AT ALL
5. BEING SO RESTLESS THAT IT IS HARD TO SIT STILL: NOT AT ALL
2. NOT BEING ABLE TO STOP OR CONTROL WORRYING: NOT AT ALL

## 2025-04-25 NOTE — PROGRESS NOTES
Chief Complaint   Patient presents with    Follow-up     N/A       Vitals:    04/25/25 1502   BP: 121/62   BP Site: Left Upper Arm   Patient Position: Sitting   Pulse: 60   Temp: 98.5 °F (36.9 °C)   TempSrc: Temporal   SpO2: 96%   Weight: 84.8 kg (187 lb)   Height: 1.854 m (6' 1\")     .  \"Have you been to the ER, urgent care clinic since your last visit?  Hospitalized since your last visit?\"    YES - When: approximately 4/1/25 ago.  Where and Why: [facility] .    “Have you seen or consulted any other health care providers outside of [facility] since your last visit?”    NO          Click Here for Release of Records Request    
Right and left Lobe,   Segment 4A, 0.8 x 0.8 cm, unchanged in size from prior with subtle washout LR4.  Segment 8, 1.8 x 1.6 cm from a prior 1.5 x 0.9 cm. No obvious washout or capsule. LR5.  Segment 8, 1.9 x 1.3 cm, increased in size from prior 1.4 x 1.3 cm. Subtle washout LR5.  Segment 5 1.2 x 0.5 cm from a prior 0.4 x 0.4 cm with no washout or capsule formation LR4.  Segment 2 containing internal fat with arterial isoenhancement capsule, and washout 1.6 x 1.2 cm LR4.     This was stage T4 involving multiple segments and both lobes of the liver.    ECOG Performance Status:  Grade 0:  No symptoms, Fully active, able to carry on all pre-disease activity without restrictions.      BCLC Stage  Stage B.  Intermediate stage.  Multi-nodular.  Preserved liver function.  ECOG performance status 0.      HCC in segment 8 will be treated with MWA.  The other lesions are LIRDS-4 and will be monitored.    Will repeat MRI 3 months after treatment.     Screening for Esophageal varices   The patient has esophageal varices without prior bleeding.    EGD in 2/2023 demonstrated Small Esophageal varices.  Banding was not performed.    Will schedule for EGD to assess for varices     The patient is on a beta-blocker to reduce the risk of variceal hemorrhage.      Hepatic encephalopathy   Overt HE has not developed to date.    There is no reason for treatment with lactulose of xifaxan    Thrombocytopenia   This is secondary to cirrhosis.  There is no evidence of overt bleeding.    No treatment is required.  The platelet count is adequate for the patient to undergo procedures without the need for platelet transfusion or platelet growth factors.    Treatment of other medical problems in patients with chronic liver disease  There are no contraindications for the patient to take most medications that are necessary for treatment of other medical issues.    The patient has cirrhrosis and should avoid taking NSAIDs which are associated with a

## 2025-05-06 ENCOUNTER — CLINICAL DOCUMENTATION (OUTPATIENT)
Age: 80
End: 2025-05-06

## 2025-05-06 NOTE — PROGRESS NOTES
Imaging reviewed, there are 4 lesions, 2 LIRAD 5 and 2 LIRAD 4, all lesions are under 2cm, it is possible to treat the LIRAD 5 lesions with MWA - ablation zones may overlap and may cause some pain but would treat at the same time if possible, recommend IR consult to discuss treatment with MWA.

## 2025-05-08 LAB
Lab: NORMAL
Lab: NORMAL
REFERENCE LAB: NORMAL

## 2025-05-12 ENCOUNTER — TRANSCRIBE ORDERS (OUTPATIENT)
Facility: HOSPITAL | Age: 80
End: 2025-05-12

## 2025-05-12 DIAGNOSIS — C22.0 HEPATOCELLULAR CARCINOMA (HCC): Primary | ICD-10-CM

## 2025-05-19 ENCOUNTER — TRANSCRIBE ORDERS (OUTPATIENT)
Facility: HOSPITAL | Age: 80
End: 2025-05-19

## 2025-05-19 DIAGNOSIS — C22.0 HEPATOCELLULAR CARCINOMA (HCC): Primary | ICD-10-CM

## 2025-05-19 NOTE — PROGRESS NOTES
Post MWA labs entered into system per verbal order Dr. Perales - 2 weeks treatment. Mailed lab orders to patient's home address.

## 2025-06-03 DIAGNOSIS — N40.0 BENIGN PROSTATIC HYPERPLASIA WITHOUT LOWER URINARY TRACT SYMPTOMS: ICD-10-CM

## 2025-06-04 RX ORDER — FINASTERIDE 5 MG/1
TABLET, FILM COATED ORAL DAILY
Qty: 90 TABLET | Refills: 1 | Status: SHIPPED | OUTPATIENT
Start: 2025-06-04

## 2025-06-09 ENCOUNTER — HOSPITAL ENCOUNTER (OUTPATIENT)
Facility: HOSPITAL | Age: 80
Discharge: HOME | End: 2025-06-09
Attending: STUDENT IN AN ORGANIZED HEALTH CARE EDUCATION/TRAINING PROGRAM | Admitting: STUDENT IN AN ORGANIZED HEALTH CARE EDUCATION/TRAINING PROGRAM
Payer: MEDICARE

## 2025-06-09 ENCOUNTER — ANESTHESIA (OUTPATIENT)
Facility: HOSPITAL | Age: 80
End: 2025-06-09
Payer: MEDICARE

## 2025-06-09 ENCOUNTER — HOSPITAL ENCOUNTER (OUTPATIENT)
Facility: HOSPITAL | Age: 80
Discharge: HOME | End: 2025-06-12
Payer: MEDICARE

## 2025-06-09 ENCOUNTER — ANESTHESIA EVENT (OUTPATIENT)
Facility: HOSPITAL | Age: 80
End: 2025-06-09
Payer: MEDICARE

## 2025-06-09 VITALS
RESPIRATION RATE: 19 BRPM | BODY MASS INDEX: 24.78 KG/M2 | WEIGHT: 187 LBS | HEART RATE: 60 BPM | OXYGEN SATURATION: 100 % | DIASTOLIC BLOOD PRESSURE: 100 MMHG | HEIGHT: 73 IN | SYSTOLIC BLOOD PRESSURE: 149 MMHG | TEMPERATURE: 98.7 F

## 2025-06-09 VITALS
SYSTOLIC BLOOD PRESSURE: 142 MMHG | DIASTOLIC BLOOD PRESSURE: 77 MMHG | TEMPERATURE: 98 F | RESPIRATION RATE: 14 BRPM | HEART RATE: 65 BPM | OXYGEN SATURATION: 98 %

## 2025-06-09 DIAGNOSIS — C22.0 HEPATOCELLULAR CARCINOMA (HCC): ICD-10-CM

## 2025-06-09 PROCEDURE — 6360000002 HC RX W HCPCS: Performed by: NURSE ANESTHETIST, CERTIFIED REGISTERED

## 2025-06-09 PROCEDURE — 6360000002 HC RX W HCPCS: Performed by: STUDENT IN AN ORGANIZED HEALTH CARE EDUCATION/TRAINING PROGRAM

## 2025-06-09 PROCEDURE — 2500000003 HC RX 250 WO HCPCS: Performed by: NURSE ANESTHETIST, CERTIFIED REGISTERED

## 2025-06-09 PROCEDURE — 2580000003 HC RX 258: Performed by: NURSE ANESTHETIST, CERTIFIED REGISTERED

## 2025-06-09 PROCEDURE — 2580000003 HC RX 258: Performed by: STUDENT IN AN ORGANIZED HEALTH CARE EDUCATION/TRAINING PROGRAM

## 2025-06-09 PROCEDURE — 6360000004 HC RX CONTRAST MEDICATION: Performed by: STUDENT IN AN ORGANIZED HEALTH CARE EDUCATION/TRAINING PROGRAM

## 2025-06-09 PROCEDURE — 96374 THER/PROPH/DIAG INJ IV PUSH: CPT

## 2025-06-09 PROCEDURE — 47382 PERCUT ABLATE LIVER RF: CPT

## 2025-06-09 PROCEDURE — 77014 IR US ABDOMINAL LIMITED: CPT

## 2025-06-09 RX ORDER — SUCCINYLCHOLINE/SOD CL,ISO/PF 200MG/10ML
SYRINGE (ML) INTRAVENOUS
Status: DISCONTINUED | OUTPATIENT
Start: 2025-06-09 | End: 2025-06-09 | Stop reason: SDUPTHER

## 2025-06-09 RX ORDER — ONDANSETRON 2 MG/ML
INJECTION INTRAMUSCULAR; INTRAVENOUS
Status: DISCONTINUED | OUTPATIENT
Start: 2025-06-09 | End: 2025-06-09 | Stop reason: SDUPTHER

## 2025-06-09 RX ORDER — SODIUM CHLORIDE 0.9 % (FLUSH) 0.9 %
5-40 SYRINGE (ML) INJECTION EVERY 12 HOURS SCHEDULED
OUTPATIENT
Start: 2025-06-09

## 2025-06-09 RX ORDER — DEXAMETHASONE SODIUM PHOSPHATE 4 MG/ML
INJECTION, SOLUTION INTRA-ARTICULAR; INTRALESIONAL; INTRAMUSCULAR; INTRAVENOUS; SOFT TISSUE
Status: DISCONTINUED | OUTPATIENT
Start: 2025-06-09 | End: 2025-06-09 | Stop reason: SDUPTHER

## 2025-06-09 RX ORDER — ONDANSETRON 2 MG/ML
4 INJECTION INTRAMUSCULAR; INTRAVENOUS
OUTPATIENT
Start: 2025-06-09

## 2025-06-09 RX ORDER — OXYCODONE AND ACETAMINOPHEN 5; 325 MG/1; MG/1
1 TABLET ORAL EVERY 6 HOURS PRN
Qty: 12 TABLET | Refills: 0 | Status: SHIPPED | OUTPATIENT
Start: 2025-06-09 | End: 2025-06-12

## 2025-06-09 RX ORDER — OXYCODONE HYDROCHLORIDE 5 MG/1
5 TABLET ORAL
Refills: 0 | OUTPATIENT
Start: 2025-06-09

## 2025-06-09 RX ORDER — EPHEDRINE SULFATE 50 MG/ML
INJECTION INTRAVENOUS
Status: DISCONTINUED | OUTPATIENT
Start: 2025-06-09 | End: 2025-06-09 | Stop reason: SDUPTHER

## 2025-06-09 RX ORDER — NALOXONE HYDROCHLORIDE 0.4 MG/ML
INJECTION, SOLUTION INTRAMUSCULAR; INTRAVENOUS; SUBCUTANEOUS PRN
OUTPATIENT
Start: 2025-06-09

## 2025-06-09 RX ORDER — FENTANYL CITRATE 50 UG/ML
25 INJECTION, SOLUTION INTRAMUSCULAR; INTRAVENOUS EVERY 5 MIN PRN
Refills: 0 | OUTPATIENT
Start: 2025-06-09

## 2025-06-09 RX ORDER — ROCURONIUM BROMIDE 10 MG/ML
INJECTION, SOLUTION INTRAVENOUS
Status: DISCONTINUED | OUTPATIENT
Start: 2025-06-09 | End: 2025-06-09 | Stop reason: SDUPTHER

## 2025-06-09 RX ORDER — MORPHINE SULFATE 2 MG/ML
1 INJECTION, SOLUTION INTRAMUSCULAR; INTRAVENOUS ONCE
Status: COMPLETED | OUTPATIENT
Start: 2025-06-09 | End: 2025-06-09

## 2025-06-09 RX ORDER — FENTANYL CITRATE 50 UG/ML
INJECTION, SOLUTION INTRAMUSCULAR; INTRAVENOUS
Status: DISCONTINUED | OUTPATIENT
Start: 2025-06-09 | End: 2025-06-09 | Stop reason: SDUPTHER

## 2025-06-09 RX ORDER — SODIUM CHLORIDE 9 MG/ML
INJECTION, SOLUTION INTRAVENOUS CONTINUOUS PRN
Status: COMPLETED | OUTPATIENT
Start: 2025-06-09 | End: 2025-06-09

## 2025-06-09 RX ORDER — LIDOCAINE HYDROCHLORIDE 20 MG/ML
INJECTION, SOLUTION EPIDURAL; INFILTRATION; INTRACAUDAL; PERINEURAL
Status: DISCONTINUED | OUTPATIENT
Start: 2025-06-09 | End: 2025-06-09 | Stop reason: SDUPTHER

## 2025-06-09 RX ORDER — IOPAMIDOL 755 MG/ML
100 INJECTION, SOLUTION INTRAVASCULAR
Status: DISCONTINUED | OUTPATIENT
Start: 2025-06-09 | End: 2025-06-13 | Stop reason: HOSPADM

## 2025-06-09 RX ORDER — SODIUM CHLORIDE 0.9 % (FLUSH) 0.9 %
5-40 SYRINGE (ML) INJECTION PRN
OUTPATIENT
Start: 2025-06-09

## 2025-06-09 RX ORDER — IOPAMIDOL 755 MG/ML
INJECTION, SOLUTION INTRAVASCULAR PRN
Status: COMPLETED | OUTPATIENT
Start: 2025-06-09 | End: 2025-06-09

## 2025-06-09 RX ADMIN — ROCURONIUM BROMIDE 5 MG: 10 SOLUTION INTRAVENOUS at 09:18

## 2025-06-09 RX ADMIN — PHENYLEPHRINE HYDROCHLORIDE 80 MCG: 10 INJECTION INTRAVENOUS at 10:04

## 2025-06-09 RX ADMIN — FENTANYL CITRATE 25 MCG: 50 INJECTION INTRAMUSCULAR; INTRAVENOUS at 11:39

## 2025-06-09 RX ADMIN — Medication 120 MG: at 09:19

## 2025-06-09 RX ADMIN — ROCURONIUM BROMIDE 10 MG: 10 SOLUTION INTRAVENOUS at 11:04

## 2025-06-09 RX ADMIN — PHENYLEPHRINE HYDROCHLORIDE 80 MCG: 10 INJECTION INTRAVENOUS at 09:35

## 2025-06-09 RX ADMIN — FENTANYL CITRATE 50 MCG: 50 INJECTION INTRAMUSCULAR; INTRAVENOUS at 11:34

## 2025-06-09 RX ADMIN — EPHEDRINE SULFATE 10 MG: 50 INJECTION INTRAVENOUS at 10:05

## 2025-06-09 RX ADMIN — MORPHINE SULFATE 1 MG: 2 INJECTION, SOLUTION INTRAMUSCULAR; INTRAVENOUS at 13:05

## 2025-06-09 RX ADMIN — ROCURONIUM BROMIDE 10 MG: 10 SOLUTION INTRAVENOUS at 10:35

## 2025-06-09 RX ADMIN — SUGAMMADEX 200 MG: 100 INJECTION, SOLUTION INTRAVENOUS at 11:34

## 2025-06-09 RX ADMIN — SODIUM CHLORIDE 125 ML/HR: 9 INJECTION, SOLUTION INTRAVENOUS at 09:07

## 2025-06-09 RX ADMIN — ONDANSETRON 4 MG: 2 INJECTION INTRAMUSCULAR; INTRAVENOUS at 11:05

## 2025-06-09 RX ADMIN — EPHEDRINE SULFATE 10 MG: 50 INJECTION INTRAVENOUS at 11:10

## 2025-06-09 RX ADMIN — DEXAMETHASONE SODIUM PHOSPHATE 4 MG: 4 INJECTION, SOLUTION INTRAMUSCULAR; INTRAVENOUS at 09:43

## 2025-06-09 RX ADMIN — FENTANYL CITRATE 25 MCG: 50 INJECTION INTRAMUSCULAR; INTRAVENOUS at 09:18

## 2025-06-09 RX ADMIN — LIDOCAINE HYDROCHLORIDE 25 MG: 20 INJECTION, SOLUTION EPIDURAL; INFILTRATION; INTRACAUDAL; PERINEURAL at 09:18

## 2025-06-09 RX ADMIN — PROPOFOL 30 MG: 10 INJECTION, EMULSION INTRAVENOUS at 11:34

## 2025-06-09 RX ADMIN — IOPAMIDOL 100 ML: 755 INJECTION, SOLUTION INTRAVENOUS at 11:00

## 2025-06-09 RX ADMIN — ROCURONIUM BROMIDE 25 MG: 10 SOLUTION INTRAVENOUS at 09:29

## 2025-06-09 RX ADMIN — PROPOFOL 120 MG: 10 INJECTION, EMULSION INTRAVENOUS at 09:18

## 2025-06-09 ASSESSMENT — PAIN SCALES - GENERAL
PAINLEVEL_OUTOF10: 5
PAINLEVEL_OUTOF10: 6
PAINLEVEL_OUTOF10: 3

## 2025-06-09 ASSESSMENT — PAIN DESCRIPTION - LOCATION
LOCATION: ABDOMEN

## 2025-06-09 NOTE — OR NURSING
Patient placed on angio table with some assistance. IR Staff and anesthesia present at bedside. Anesthesia to remain at bedside to manage pt airway, medications, VS and pt status.

## 2025-06-09 NOTE — PROCEDURES
Brief Postoperative Note    Thien Finn  YOB: 1945  [id number]    Pre-operative Diagnosis: HCC    Post-operative Diagnosis: Same    Procedure: Segment 8 liver mass MWA x2    Anesthesia: General     Surgeons/Assistants: Dior Mccall MD    Estimated Blood Loss: Minimal     Complications: None    Specimens: Was Not Obtained    Findings: US- and CT- guided microwave ablation of two segment 8 hepatomas. The smaller and more peripheral of the two tumors was extremely difficult to visualize either with US or contrast-enhanced CT.     Electronically signed by Dior Mccall MD on 6/9/2025 at 11:58 AM

## 2025-06-09 NOTE — ANESTHESIA PRE PROCEDURE
Department of Anesthesiology  Preprocedure Note       Name:  Thien Finn   Age:  79 y.o.  :  1945                                          MRN:  [id number]         Date:  2025      Surgeon: * No surgeons listed *    Procedure: * No procedures listed *    Medications prior to admission:   Prior to Admission medications    Medication Sig Start Date End Date Taking? Authorizing Provider   oxyCODONE-acetaminophen (PERCOCET) 5-325 MG per tablet Take 1 tablet by mouth every 6 hours as needed for Pain for up to 3 days. Intended supply: 5 days. Take lowest dose possible to manage pain Max Daily Amount: 4 tablets 25  Dior Mccall MD   finasteride (PROSCAR) 5 MG tablet TAKE 1 TABLET BY MOUTH DAILY 25   Celestine Littlejohn MD   Multiple Vitamins-Minerals (MULTIVITAL PO) Take 1 tablet by mouth daily    ProviderGodfrey MD   UNITHROID 150 MCG tablet Take 1 tablet by mouth Daily Increased 3/23/25 3/23/25   Celestine Littlejohn MD   tamsulosin (FLOMAX) 0.4 MG capsule TAKE 1 CAPSULE BY MOUTH DAILY 24   Celestine Littlejohn MD   carvedilol (COREG) 6.25 MG tablet Take 1 tablet by mouth nightly 22   Automatic Reconciliation, Ar   Cholecalciferol 50 MCG (2000 UT) CAPS Take 1 capsule by mouth daily 10/20/22   Automatic Reconciliation, Ar   clonazePAM (KLONOPIN) 0.5 MG tablet Take 1 tablet by mouth nightly.    Automatic Reconciliation, Ar   cyanocobalamin 1000 MCG tablet Take 1 tablet by mouth daily    Automatic Reconciliation, Ar   ferrous sulfate (SLOW FE) 142 (45 Fe) MG extended release tablet Take 142 mg by mouth every morning (before breakfast) 10/20/22   Automatic Reconciliation, Ar       Current medications:    Current Outpatient Medications   Medication Sig Dispense Refill    oxyCODONE-acetaminophen (PERCOCET) 5-325 MG per tablet Take 1 tablet by mouth every 6 hours as needed for Pain for up to 3 days. Intended supply: 5 days. Take lowest dose possible to manage pain Max Daily Amount: 4 tablets 12

## 2025-06-09 NOTE — PROGRESS NOTES
0730: Pt arrives ambulatory to angio department accompanied by family for CT guided liver ablation procedure. All assessments completed and consent was reviewed.  Education given was regarding procedure, anesthesia sedation, post-procedure care and  management/follow-up. Opportunity for questions was provided and all questions and concerns were addressed.     1145: pt in recovery    1400: Name of procedure:  CT and US guided liver ablation     Sedation medications given: see anesthesia documentation     Vital Signs: stable     Any complications related to procedure: IV in R. A/C infiltrated during procedure. IV removed and pressure applied. Post procedure, ice applied to affected arm and then elevated on pillows.       Post Procedure Care Needed/order sets placed in connect care: see orders     Pt tolerated procedure well. VSS. No C/O pain. Dressing to site D&I. No bleeding or hematoma noted to site. IV D/Cd. Discharge instructions given. Copy on chart and copy given to pt. Pt verbalized understanding. Pt taken to car by wheelchair and taken home by family. NAD noted at time of discharge.

## 2025-06-09 NOTE — H&P
of Transportation (Medical): No     Lack of Transportation (Non-Medical): No   Physical Activity: Sufficiently Active (3/13/2025)    Exercise Vital Sign     Days of Exercise per Week: 7 days     Minutes of Exercise per Session: 30 min   Stress: Not on file   Social Connections: Not on file   Intimate Partner Violence: Not on file   Housing Stability: Low Risk  (3/15/2025)    Housing Stability Vital Sign     Unable to Pay for Housing in the Last Year: No     Number of Times Moved in the Last Year: 0     Homeless in the Last Year: No       Allergies:   Allergies   Allergen Reactions    Aspirin Hives    Ibuprofen Hives    Hydrocodone Itching, Swelling and Rash     Rash on feet and ankles    Levofloxacin Rash     Body aches, skin sensitivity       Current Medications:  No current facility-administered medications for this encounter.        Physical Exam:  Blood pressure 135/73, pulse 66, temperature 98.7 °F (37.1 °C), temperature source Oral, resp. rate 16, height 1.854 m (6' 1\"), weight 84.8 kg (187 lb), SpO2 96%.  GENERAL: alert, cooperative, no distress, appears stated age,   LUNG: Nonlabored respiration on room air  HEART: Well perfused   EXT: No edema BLEs  ABD: Nondistended      Plan of Care/Planned Procedure:    # BCLC Early Stage (A) HCC  - PS 0, preserved liver function, two segment 8 LR-5 HCC's without macrovascular invasion.  - CT- and US-guided MWA. Risks, benefits, and alternatives reviewed with patient and he agrees to proceed with the procedure.     Dior Mccall MD  [facility]  7:52 AM, 6/9/2025

## 2025-06-09 NOTE — DISCHARGE INSTRUCTIONS
well the treatment worked.  This care sheet gives you a general idea about how long it will take for you to recover. But each person recovers at a different pace. Follow the steps below to get better as quickly as possible.  How can you care for yourself at home?  Activity    Rest when you feel tired. Getting enough sleep will help you recover.     Try to walk each day. Start by walking a little more than you did the day before. Bit by bit, increase the amount you walk. Walking boosts blood flow and helps prevent pneumonia and constipation.     Avoid strenuous activities, such as bicycle riding, jogging, weight lifting, or aerobic exercise, until your doctor says it is okay.     For 2 to 3 days after the procedure, avoid lifting anything that would make you strain. This may include a child, heavy grocery bags and milk containers, a heavy briefcase or backpack, cat litter or dog food bags, or a vacuum .     You may shower 24 hours after the procedure, if your doctor says it is okay. Tape a plastic bag over the puncture site to keep it dry while you shower. Pat the puncture site dry. Do not take a bath for the first 5 days, or until your doctor tells you it is okay.     Ask your doctor when you can drive again.     Most people are able to return to work within 1 to 2 weeks after the procedure.   Diet    You can eat your normal diet. If your stomach is upset, try bland, low-fat foods like plain rice, broiled chicken, toast, and yogurt.     Drink plenty of fluids (unless your doctor tells you not to).   Medicines    Your doctor will tell you if and when you can restart your medicines. You will also get instructions about taking any new medicines.     If you stopped taking aspirin or some other blood thinner, your doctor will tell you when to start taking it again.     Be safe with medicines. Take pain medicines exactly as directed.  If the doctor gave you a prescription medicine for pain, take it as

## 2025-06-09 NOTE — ANESTHESIA POSTPROCEDURE EVALUATION
Department of Anesthesiology  Postprocedure Note    Patient: Thien Finn  MRN: [id number]  YOB: 1945  Date of evaluation: 6/9/2025    Procedure Summary       Date: 06/09/25 Room / Location: [facility] RADIOLOGY; [facility] CT    Anesthesia Start: 0905 Anesthesia Stop: 1142    Procedure: CT RFA LIVER TUMOR(S) PERC Diagnosis: Hepatocellular carcinoma (HCC)    Scheduled Providers: Radiologist, [facility]; Osvaldo Tran MD Responsible Provider: Osvaldo Tran MD    Anesthesia Type: General ASA Status: 3            Anesthesia Type: General    Joe Phase I:      Joe Phase II:      Anesthesia Post Evaluation    Patient location during evaluation: PACU  Patient participation: complete - patient participated  Level of consciousness: awake and alert  Airway patency: patent  Nausea & Vomiting: no nausea and no vomiting  Cardiovascular status: hemodynamically stable  Respiratory status: acceptable  Hydration status: stable    No notable events documented.

## 2025-06-10 ENCOUNTER — RESULTS FOLLOW-UP (OUTPATIENT)
Age: 80
End: 2025-06-10

## 2025-06-10 ENCOUNTER — TELEPHONE (OUTPATIENT)
Age: 80
End: 2025-06-10

## 2025-06-10 NOTE — TELEPHONE ENCOUNTER
Received call from patient, he states he is very sore post procedure today but feels as well as he can at this point - he is continuing to take Oxycodone as prescribed. He called to clarify his upcoming procedure on 7/10/25 - verified that the procedure is an EGD to assess for varices, reviewed date, time, and prep for procedure. He verbalized understanding.     We reviewed that he will need to repeat labwork in 2 weeks - patient confirms that he received lab orders in the mail and will get these completed at [facility]. Confirmed with him that I will assist to schedule his next imaging study - 2-3 months post procedure and will mail that appt to him - patient requests imaging be completed at [facility].

## 2025-06-23 DIAGNOSIS — C22.0 HEPATOCELLULAR CARCINOMA (HCC): ICD-10-CM

## 2025-06-25 ENCOUNTER — RESULTS FOLLOW-UP (OUTPATIENT)
Age: 80
End: 2025-06-25

## 2025-06-25 LAB
ALBUMIN SERPL-MCNC: 3.6 G/DL (ref 3.7–4.7)
ALP SERPL-CCNC: 153 IU/L (ref 44–121)
ALT SERPL-CCNC: 33 IU/L (ref 0–44)
AST SERPL-CCNC: 44 IU/L (ref 0–40)
BASOPHILS # BLD AUTO: 0 X10E3/UL (ref 0–0.2)
BASOPHILS NFR BLD AUTO: 1 %
BILIRUB DIRECT SERPL-MCNC: 0.75 MG/DL (ref 0–0.4)
BILIRUB SERPL-MCNC: 1.6 MG/DL (ref 0–1.2)
BUN SERPL-MCNC: 14 MG/DL (ref 8–27)
BUN/CREAT SERPL: 16 (ref 10–24)
CALCIUM SERPL-MCNC: 8.9 MG/DL (ref 8.6–10.2)
CHLORIDE SERPL-SCNC: 108 MMOL/L (ref 96–106)
CO2 SERPL-SCNC: 21 MMOL/L (ref 20–29)
CREAT SERPL-MCNC: 0.88 MG/DL (ref 0.76–1.27)
EGFRCR SERPLBLD CKD-EPI 2021: 86 ML/MIN/1.73
EOSINOPHIL # BLD AUTO: 0.1 X10E3/UL (ref 0–0.4)
EOSINOPHIL NFR BLD AUTO: 4 %
ERYTHROCYTE [DISTWIDTH] IN BLOOD BY AUTOMATED COUNT: 12.6 % (ref 11.6–15.4)
GLUCOSE SERPL-MCNC: 146 MG/DL (ref 70–99)
HCT VFR BLD AUTO: 37.4 % (ref 37.5–51)
HGB BLD-MCNC: 12.2 G/DL (ref 13–17.7)
IMM GRANULOCYTES # BLD AUTO: 0 X10E3/UL (ref 0–0.1)
IMM GRANULOCYTES NFR BLD AUTO: 0 %
INR PPP: 1.1 (ref 0.9–1.2)
LYMPHOCYTES # BLD AUTO: 0.6 X10E3/UL (ref 0.7–3.1)
LYMPHOCYTES NFR BLD AUTO: 17 %
MCH RBC QN AUTO: 34 PG (ref 26.6–33)
MCHC RBC AUTO-ENTMCNC: 32.6 G/DL (ref 31.5–35.7)
MCV RBC AUTO: 104 FL (ref 79–97)
MONOCYTES # BLD AUTO: 0.4 X10E3/UL (ref 0.1–0.9)
MONOCYTES NFR BLD AUTO: 10 %
NEUTROPHILS # BLD AUTO: 2.6 X10E3/UL (ref 1.4–7)
NEUTROPHILS NFR BLD AUTO: 68 %
PLATELET # BLD AUTO: 105 X10E3/UL (ref 150–450)
POTASSIUM SERPL-SCNC: 4.3 MMOL/L (ref 3.5–5.2)
PROT SERPL-MCNC: 6.5 G/DL (ref 6–8.5)
PROTHROMBIN TIME: 12.5 SEC (ref 9.1–12)
RBC # BLD AUTO: 3.59 X10E6/UL (ref 4.14–5.8)
SODIUM SERPL-SCNC: 140 MMOL/L (ref 134–144)
WBC # BLD AUTO: 3.7 X10E3/UL (ref 3.4–10.8)

## 2025-06-26 DIAGNOSIS — C22.0 HEPATOCELLULAR CARCINOMA (HCC): ICD-10-CM

## 2025-06-26 DIAGNOSIS — K75.81 METABOLIC DYSFUNCTION-ASSOCIATED STEATOHEPATITIS (MASH): Primary | ICD-10-CM

## 2025-06-26 NOTE — PROGRESS NOTES
Reviewed post treatment labwork with Dr. Perales - received verbal order for FU MRI imaging before next appointment scheduled for 9/12/25, order entered into system for scheduling.

## 2025-06-27 ENCOUNTER — PREP FOR PROCEDURE (OUTPATIENT)
Age: 80
End: 2025-06-27

## 2025-06-27 DIAGNOSIS — C22.0 HEPATOCELLULAR CARCINOMA (HCC): ICD-10-CM

## 2025-07-07 ENCOUNTER — OFFICE VISIT (OUTPATIENT)
Facility: CLINIC | Age: 80
End: 2025-07-07
Payer: MEDICARE

## 2025-07-07 VITALS
DIASTOLIC BLOOD PRESSURE: 63 MMHG | RESPIRATION RATE: 14 BRPM | HEIGHT: 73 IN | BODY MASS INDEX: 24.49 KG/M2 | TEMPERATURE: 98 F | SYSTOLIC BLOOD PRESSURE: 112 MMHG | HEART RATE: 59 BPM | WEIGHT: 184.8 LBS | OXYGEN SATURATION: 96 %

## 2025-07-07 DIAGNOSIS — R73.01 IFG (IMPAIRED FASTING GLUCOSE): ICD-10-CM

## 2025-07-07 DIAGNOSIS — E03.9 ACQUIRED HYPOTHYROIDISM: Primary | ICD-10-CM

## 2025-07-07 DIAGNOSIS — C22.0 HEPATOCELLULAR CARCINOMA (HCC): ICD-10-CM

## 2025-07-07 DIAGNOSIS — E03.9 ACQUIRED HYPOTHYROIDISM: ICD-10-CM

## 2025-07-07 DIAGNOSIS — K75.81 METABOLIC DYSFUNCTION-ASSOCIATED STEATOHEPATITIS (MASH): ICD-10-CM

## 2025-07-07 PROCEDURE — 1036F TOBACCO NON-USER: CPT | Performed by: INTERNAL MEDICINE

## 2025-07-07 PROCEDURE — 1123F ACP DISCUSS/DSCN MKR DOCD: CPT | Performed by: INTERNAL MEDICINE

## 2025-07-07 PROCEDURE — G8428 CUR MEDS NOT DOCUMENT: HCPCS | Performed by: INTERNAL MEDICINE

## 2025-07-07 PROCEDURE — G8420 CALC BMI NORM PARAMETERS: HCPCS | Performed by: INTERNAL MEDICINE

## 2025-07-07 PROCEDURE — 99213 OFFICE O/P EST LOW 20 MIN: CPT | Performed by: INTERNAL MEDICINE

## 2025-07-07 NOTE — PROGRESS NOTES
HISTORY OF PRESENT ILLNESS    Chief Complaint   Patient presents with    Follow-up       Presents for follow-up    S/p RIH repair Dr. London 4/1/25.  Doing well.      [facility]  Lab Results   Component Value Date/Time    ALKPHOS 153 06/24/2025 03:28 PM    ALT 33 06/24/2025 03:28 PM    AST 44 06/24/2025 03:28 PM    BILITOT 1.6 06/24/2025 03:28 PM    BILIDIR 0.75 06/24/2025 03:28 PM     HCC. Likely, multifocal   Seeing Dr Alvarez.  S/p ablation w Dr. Mccall 6/9/25  Left inguinal LAD    Endoscopy pending 7/10/25 for varices evaluation.     Hypothyroidism follow-up  denies heat/cold intolerance, bowel/skin changes or CVS symptoms, losing hair, feeling excessive energy, tremulousness, palpitations. Thyroid medication has been increased since last medication check and labs.   Lab Results   Component Value Date    TSH 17.80 (H) 03/21/2025     Wt Readings from Last 3 Encounters:   07/07/25 83.8 kg (184 lb 12.8 oz)   06/09/25 84.8 kg (187 lb)   04/25/25 84.8 kg (187 lb)     Impaired fasting glucose / Pre-diabetes follow-up  No results found for: \"GLU\"  Last hemoglobin a1c   Hemoglobin A1C   Date Value Ref Range Status   03/07/2024 5.7 (H) 4.0 - 5.6 % Final     Comment:     (NOTE)  HbA1C Interpretive Ranges  <5.7              Normal  5.7 - 6.4         Consider Prediabetes  >6.5              Consider Diabetes       Last Point of Care HGB A1C  No components found for: \"POCA1C\"   Diabetic diet compliance: compliant most of the time. Patient does not perform home glucose monitoring.        Review of Systems   All other systems reviewed and are negative, except as noted in HPI    Past Medical and Surgical History   has a past medical history of Abdominal lymphadenopathy, B12 deficiency, Cataract, Chronic anxiety, Coronary artery calcification seen on CT scan, Degenerative joint disease (DJD) of lumbar spine, Environmental allergies, Esophageal varices (HCC), Essential tremor, FH: colon cancer, Hepatitis A, Hypospadias,

## 2025-07-08 ENCOUNTER — RESULTS FOLLOW-UP (OUTPATIENT)
Facility: CLINIC | Age: 80
End: 2025-07-08

## 2025-07-08 DIAGNOSIS — E03.9 ACQUIRED HYPOTHYROIDISM: ICD-10-CM

## 2025-07-08 LAB
EST. AVERAGE GLUCOSE BLD GHB EST-MCNC: 103 MG/DL
HBA1C MFR BLD: 5.2 % (ref 4–5.6)
T4 FREE SERPL-MCNC: 1.7 NG/DL (ref 0.8–1.5)
TSH SERPL DL<=0.05 MIU/L-ACNC: 0.13 UIU/ML (ref 0.36–3.74)

## 2025-07-08 RX ORDER — LEVOTHYROXINE SODIUM 150 UG/1
TABLET ORAL
Qty: 90 TABLET | Refills: 1 | Status: SHIPPED
Start: 2025-07-08

## 2025-07-10 ENCOUNTER — HOSPITAL ENCOUNTER (OUTPATIENT)
Facility: HOSPITAL | Age: 80
Setting detail: OUTPATIENT SURGERY
Discharge: HOME | End: 2025-07-10
Attending: INTERNAL MEDICINE | Admitting: INTERNAL MEDICINE
Payer: MEDICARE

## 2025-07-10 ENCOUNTER — ANESTHESIA EVENT (OUTPATIENT)
Facility: HOSPITAL | Age: 80
End: 2025-07-10
Payer: MEDICARE

## 2025-07-10 ENCOUNTER — ANESTHESIA (OUTPATIENT)
Facility: HOSPITAL | Age: 80
End: 2025-07-10
Payer: MEDICARE

## 2025-07-10 VITALS
WEIGHT: 181.9 LBS | SYSTOLIC BLOOD PRESSURE: 165 MMHG | HEIGHT: 73 IN | OXYGEN SATURATION: 98 % | DIASTOLIC BLOOD PRESSURE: 88 MMHG | BODY MASS INDEX: 24.11 KG/M2 | TEMPERATURE: 97.6 F | HEART RATE: 62 BPM | RESPIRATION RATE: 13 BRPM

## 2025-07-10 PROBLEM — K31.7 GASTRIC POLYP: Status: ACTIVE | Noted: 2025-07-10

## 2025-07-10 PROBLEM — K25.9 GASTRIC EROSION: Status: ACTIVE | Noted: 2025-07-10

## 2025-07-10 PROCEDURE — 3600007512: Performed by: INTERNAL MEDICINE

## 2025-07-10 PROCEDURE — 3700000001 HC ADD 15 MINUTES (ANESTHESIA): Performed by: INTERNAL MEDICINE

## 2025-07-10 PROCEDURE — 3600007502: Performed by: INTERNAL MEDICINE

## 2025-07-10 PROCEDURE — 7100000011 HC PHASE II RECOVERY - ADDTL 15 MIN: Performed by: INTERNAL MEDICINE

## 2025-07-10 PROCEDURE — 2720000010 HC SURG SUPPLY STERILE: Performed by: INTERNAL MEDICINE

## 2025-07-10 PROCEDURE — 2580000003 HC RX 258: Performed by: INTERNAL MEDICINE

## 2025-07-10 PROCEDURE — 6360000002 HC RX W HCPCS: Performed by: NURSE ANESTHETIST, CERTIFIED REGISTERED

## 2025-07-10 PROCEDURE — 2709999900 HC NON-CHARGEABLE SUPPLY: Performed by: INTERNAL MEDICINE

## 2025-07-10 PROCEDURE — 3700000000 HC ANESTHESIA ATTENDED CARE: Performed by: INTERNAL MEDICINE

## 2025-07-10 PROCEDURE — 88305 TISSUE EXAM BY PATHOLOGIST: CPT

## 2025-07-10 PROCEDURE — 7100000010 HC PHASE II RECOVERY - FIRST 15 MIN: Performed by: INTERNAL MEDICINE

## 2025-07-10 RX ORDER — LIDOCAINE HYDROCHLORIDE 20 MG/ML
INJECTION, SOLUTION EPIDURAL; INFILTRATION; INTRACAUDAL; PERINEURAL
Status: DISCONTINUED | OUTPATIENT
Start: 2025-07-10 | End: 2025-07-10 | Stop reason: SDUPTHER

## 2025-07-10 RX ORDER — SODIUM CHLORIDE 9 MG/ML
INJECTION, SOLUTION INTRAVENOUS PRN
Status: DISCONTINUED | OUTPATIENT
Start: 2025-07-10 | End: 2025-07-10 | Stop reason: HOSPADM

## 2025-07-10 RX ORDER — FENTANYL CITRATE 50 UG/ML
25 INJECTION, SOLUTION INTRAMUSCULAR; INTRAVENOUS AS NEEDED
Status: DISCONTINUED | OUTPATIENT
Start: 2025-07-10 | End: 2025-07-10 | Stop reason: HOSPADM

## 2025-07-10 RX ORDER — SODIUM CHLORIDE 0.9 % (FLUSH) 0.9 %
5-40 SYRINGE (ML) INJECTION EVERY 12 HOURS SCHEDULED
Status: DISCONTINUED | OUTPATIENT
Start: 2025-07-10 | End: 2025-07-10 | Stop reason: HOSPADM

## 2025-07-10 RX ORDER — SODIUM CHLORIDE 0.9 % (FLUSH) 0.9 %
5-40 SYRINGE (ML) INJECTION PRN
Status: DISCONTINUED | OUTPATIENT
Start: 2025-07-10 | End: 2025-07-10 | Stop reason: HOSPADM

## 2025-07-10 RX ORDER — ONDANSETRON 2 MG/ML
2 INJECTION INTRAMUSCULAR; INTRAVENOUS AS NEEDED
Status: DISCONTINUED | OUTPATIENT
Start: 2025-07-10 | End: 2025-07-10 | Stop reason: HOSPADM

## 2025-07-10 RX ADMIN — PROPOFOL 50 MG: 10 INJECTION, EMULSION INTRAVENOUS at 14:14

## 2025-07-10 RX ADMIN — PROPOFOL 50 MG: 10 INJECTION, EMULSION INTRAVENOUS at 14:08

## 2025-07-10 RX ADMIN — PROPOFOL 50 MG: 10 INJECTION, EMULSION INTRAVENOUS at 14:24

## 2025-07-10 RX ADMIN — SODIUM CHLORIDE: 9 INJECTION, SOLUTION INTRAVENOUS at 13:57

## 2025-07-10 RX ADMIN — PROPOFOL 50 MG: 10 INJECTION, EMULSION INTRAVENOUS at 14:23

## 2025-07-10 RX ADMIN — PROPOFOL 50 MG: 10 INJECTION, EMULSION INTRAVENOUS at 14:03

## 2025-07-10 RX ADMIN — LIDOCAINE HYDROCHLORIDE 20 MG: 20 INJECTION, SOLUTION EPIDURAL; INFILTRATION; INTRACAUDAL; PERINEURAL at 14:01

## 2025-07-10 RX ADMIN — PROPOFOL 50 MG: 10 INJECTION, EMULSION INTRAVENOUS at 14:01

## 2025-07-10 ASSESSMENT — PAIN - FUNCTIONAL ASSESSMENT: PAIN_FUNCTIONAL_ASSESSMENT: NONE - DENIES PAIN

## 2025-07-10 NOTE — PROGRESS NOTES
Dr. Perales spoke with patient and his son at bedside. Discharge instructions given patient and his family members. All verbalized understanding.

## 2025-07-10 NOTE — DISCHARGE INSTRUCTIONS
[facility]   [address], [address]  [place], [place]  [address] [phone]  FAX: [phone]     Uday Perales MD, FACP, [facility], FAAS   MD Anne-Marie De La Torre, NADIA Ann, [facility]   Zofia Hernadez, [facility]     Angie Marie, Practice Administrator    Liver Transplant and Liver Cancer Coordination:   hSannon Celaya, ANEL Rader, ANEL Lindquist, RN    Clinical Research:   Anne-Marie Watson, ANEL Grimes, ANEL Koo, ANEL Kilgore, ANEL       ENDOSCOPY WITH BANDING DISCHARGE INSTRUCTIONS    Thien Finn    1945  Date: 7/10/2025    DISCOMFORT:  Use lozenges or warm salt water gargle for sore thoat  Apply warm compress to IV site if red.  If redness or soreness persists call the office.  You may experience gas and bloating.  Walking and belching will help relieve this.  You may experience chest pain or discomfort or feel as though food is \"sticking\" in your food pipe for a few days after the procedure. This is a normal feeling after banding of esophageal varices.    CHEST PRESSURE:  Banding of dilated veins (varices) in the food tube (esophagus) can be painful in some persons.  The pain is caused by squeezing the varices and lining of the esophagus by the bands used to seal the varices.  You can take liquid pain medication either acetaminophen or alternative.  The best treatment for this is to drink a an \"Icee\" or \"Slurpee\" since the ice crystals will freeze the nerve endings in the food tube and relieve the pain.    DIET:  Regular food may dislodge the bands placed on the varices.  For this reason you should only have liquid for the rest of today.  Eat only soft food that does not need to be chewed all day tomorrow.  You may advance to your regular diet in 2 days.    ACTIVITY:  Spend the remainder of the day resting.  Avoid any strenuous activity.  You may not operate a vehicle for 12

## 2025-07-10 NOTE — OP NOTE
[facility]  [address], [address]  [place], [place]  [address] [phone]  FAX: [phone]     Uday Perales MD, FACP, MAC, FAAS   MD Anne-Marie De La Torre, NADIA Ann, [facility]   Zofiacatalina Hernadez, [facility]     Angie Marie, Practice Administrator    Liver Transplant and Liver Cancer Coordination:   Shannon Celaya, ANEL Rader, ANEL Lindquist, RN    Clinical Research:   Anne-Marie Watson, ANEL Grimes, ANEL Koo, ANEL Kilgore, ANEL        UPPER ENDOSCOPY WITH BANDING OF ESOPHAGEAL VARICES PROCEDURE NOTE    NAME: Thien Finn  :  1945  MRN:  [id number]      INDICATION: Cirrhosis.  Screening for esophageal varices with variceal ligation if indicated.    : Uday Perales MD    SURGICAL ASSISTANT:  None    PROSTHETIC DEVISES, TISSUE GRAFTS, ORGAN TRANSPLANTS:  Not applicable     ANESTHESIA/SEDATION: Propofol was administered by anesthesia      PROCEDURE DESCRIPTION:  Informed consent was obtained from the patient for the procedure.  All risks and benefits of the procedure explained.     The procedure was performed in the endoscopy suite.  The patient was laying on a stretcher and moved to the left lateral decubitus position prior to administration of sedation.    Sedation was administered by anesthesiology.  See their note for details.      The endoscope was inserted into the mouth and advanced under direct vision to the second portion of the duodenum.  Careful inspection of upper gastrointestinal tract was made as the endoscope was inserted and withdrawn.  Retroflexion of the endoscope to view of the cardia of the stomach was performed.  After withdrawing the endoscope the banding devise was placed on the tip of the endoscope.  The scope was then reinserted under direct inspection and advanced to the esophagus.  Banding of esophageal varices was performed as described below.

## 2025-07-10 NOTE — H&P
[facility]  [address], [address]  [place]  [address] [phone]  FAX: [phone]     Uday Perales MD, FACP, MACG, FAASLD   MD Anne-Marie De La Torre, NADIA Ann, [facility]   Zofia Hernadez, [facility]     Angie Marie, Practice Administrator    Liver Transplant and Liver Cancer Coordination:   ANEL Ca, ANEL Lindquist, RN    Clinical Research:   Anne-Marie Watson, ANEL Grimes, ANEL Koo, ANEL Kilgore RN       PRE-PROCEDURE NOTE - EGD    H and P from last office visit reviewed.    Allergies reviewed.  Out-patient medicaton list reviewed.    Patient Active Problem List   Diagnosis    Hypothyroidism    Abdominal lymphadenopathy    Prostatic enlargement    B12 deficiency    Degenerative joint disease (DJD) of lumbar spine    IFG (impaired fasting glucose)    Plantar fasciitis    Essential tremor    Trigger finger    PVC (premature ventricular contraction)    Coronary artery calcification seen on CT scan    Pancytopenia (HCC)    Spinal stenosis of lumbar region at multiple levels    Chronic anxiety    Thrombocytopenia    Environmental allergies    Obstructive sleep apnea    Cataract    REM sleep behavior disorder    Hypospadias    SCC (squamous cell carcinoma), hand, left    Cirrhosis (HCC)    Peripheral vascular disease    Pulmonary emphysema, unspecified emphysema type (HCC)    Secondary esophageal varices without bleeding (HCC)    Metabolic dysfunction-associated steatohepatitis (MASH)    Non-recurrent unilateral inguinal hernia without obstruction or gangrene    Inguinal lymphadenopathy    Hepatocellular carcinoma (HCC)         Allergies   Allergen Reactions    Aspirin Hives    Ibuprofen Hives    Hydrocodone Itching, Swelling and Rash     Rash on feet and ankles    Levofloxacin Rash     Body aches, skin sensitivity       No current facility-administered medications on  file prior to encounter.     Current Outpatient Medications on File Prior to Encounter   Medication Sig Dispense Refill    finasteride (PROSCAR) 5 MG tablet TAKE 1 TABLET BY MOUTH DAILY 90 tablet 1    Multiple Vitamins-Minerals (MULTIVITAL PO) Take 1 tablet by mouth daily      tamsulosin (FLOMAX) 0.4 MG capsule TAKE 1 CAPSULE BY MOUTH DAILY 90 capsule 1    carvedilol (COREG) 6.25 MG tablet Take 1 tablet by mouth nightly      Cholecalciferol 50 MCG (2000 UT) CAPS Take 1 capsule by mouth daily      clonazePAM (KLONOPIN) 0.5 MG tablet Take 1 tablet by mouth nightly.      cyanocobalamin 1000 MCG tablet Take 1 tablet by mouth daily      ferrous sulfate (SLOW FE) 142 (45 Fe) MG extended release tablet Take 142 mg by mouth every morning (before breakfast)         For EGD to assess for esophageal and gastric varices.  Plan to perform banding if indicated based upon variceal size and appearance.    The last EGD was performed in 2/2023.    Esophageal varices were small and no banding was performed.      PHYSICAL EXAMINATION:  Vital signs per nursing and anesthesia records      General: No acute distress.   Eyes: Sclera anicteric.   ENT: No oral lesions.  Thyroid normal.  Nodes: No adenopathy.   Skin: No spider angiomata.  No jaundice.  No palmar erythema.  Respiratory: Lungs clear to auscultation.   Cardiovascular: Regular heart rate.  No murmurs.  No JVD.  Abdomen: Soft non-tender, liver size normal to percussion/palpation.  Spleen not palpable. No obvious ascites.  Extremities: No edema.  No muscle wasting.  No gross arthritic changes.  Neurologic: Alert and oriented.  Cranial nerves grossly intact.  No asterixis.      MOST RECENT LABORATORY STUDIES:  Lab Results   Component Value Date    WBC 3.7 06/24/2025    HGB 12.2 (L) 06/24/2025     (L) 06/24/2025     Lab Results   Component Value Date    AST 44 (H) 06/24/2025    ALT 33 06/24/2025    BILITOT 1.6 (H) 06/24/2025     Lab Results   Component Value Date    INR 1.1

## 2025-07-10 NOTE — PROGRESS NOTES
Initial RN admission and assessment performed and documented in Endoscopy navigator.     Patient evaluated by anesthesia in pre-procedure holding.     All procedural vital signs, airway assessment, and level of consciousness information monitored and recorded by anesthesia staff on the anesthesia record.     Report received from CRNA post procedure.  Patient transported to recovery area by RN.    Endoscopy post procedure time out was performed and specimens were verified with physician.    Endoscope was pre-cleaned at bedside immediately following procedure by sindy foss.

## 2025-07-10 NOTE — ANESTHESIA PRE PROCEDURE
Department of Anesthesiology  Preprocedure Note       Name:  Thien Finn   Age:  79 y.o.  :  1945                                          MRN:  [id number]         Date:  7/10/2025      Surgeon: Surgeon(s):  Uday Perales MD    Procedure: Procedure(s):  ESOPHAGOGASTRODUODENOSCOPY    Medications prior to admission:   Prior to Admission medications    Medication Sig Start Date End Date Taking? Authorizing Provider   UNITHROID 150 MCG tablet Take 150 mcg 6 days/week and take 1/2 pill on Sundays only.  Decreased 2025  Yes Celestine Littlejohn MD   finasteride (PROSCAR) 5 MG tablet TAKE 1 TABLET BY MOUTH DAILY 25  Yes Celestine Littlejohn MD   Multiple Vitamins-Minerals (MULTIVITAL PO) Take 1 tablet by mouth daily   Yes Provider, MD Godfrey   tamsulosin (FLOMAX) 0.4 MG capsule TAKE 1 CAPSULE BY MOUTH DAILY 24  Yes Celestine Littlejohn MD   carvedilol (COREG) 6.25 MG tablet Take 1 tablet by mouth nightly 22  Yes Automatic Reconciliation, Ar   Cholecalciferol 50 MCG (2000 UT) CAPS Take 1 capsule by mouth daily 10/20/22  Yes Automatic Reconciliation, Ar   clonazePAM (KLONOPIN) 0.5 MG tablet Take 1 tablet by mouth nightly.   Yes Automatic Reconciliation, Ar   cyanocobalamin 1000 MCG tablet Take 1 tablet by mouth daily   Yes Automatic Reconciliation, Ar   ferrous sulfate (SLOW FE) 142 (45 Fe) MG extended release tablet Take 142 mg by mouth every morning (before breakfast) 10/20/22  Yes Automatic Reconciliation, Ar       Current medications:    Current Facility-Administered Medications   Medication Dose Route Frequency Provider Last Rate Last Admin   • sodium chloride flush 0.9 % injection 5-40 mL  5-40 mL IntraVENous 2 times per day Uday Perales MD       • sodium chloride flush 0.9 % injection 5-40 mL  5-40 mL IntraVENous PRN Uday Perales MD       • 0.9 % sodium chloride infusion   IntraVENous PRN Uday Perales  mL/hr at 07/10/25 1357 New Bag at 07/10/25 1357   •  Hepatocellular carcinoma (HCC) C22.0       Past Medical History:        Diagnosis Date   • Abdominal lymphadenopathy 2002    Dr. Hearn.  lymph node enlargement near pancreas.  EUS biopsy 1/22/02 benign, but \"possible pneumonia\"   • B12 deficiency    • Cataract     Dr. Arriaza   • Chronic anxiety    • Coronary artery calcification seen on CT scan 04/11/2016    patient with LOW cholesterol.  A.  EBT (10/4/10):  LM 8, , LCx 0, .  Total = 331.   • Degenerative joint disease (DJD) of lumbar spine     MRI 3/2017 shows moderate disease and mild stenosis   • Environmental allergies     Dr. Weber. also med allergies ibuprofen   • Esophageal varices (HCC)    • Essential tremor    • FH: colon cancer     mother   • Hepatitis A     as child 6   • Hypospadias    • Hypothyroidism 04/11/2016    saw Dr. 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 Fuentes   • Lymphadenopathy 2002    left axillary biopsy 2/14/02 - benign.  Dr. Hearn, Dr. Ray   • Mononucleosis     14 yrs old   • Normal cardiac stress test 04/2016    Dr. Bennett   • Obstructive sleep apnea     on CPAP.  Dr. Tejada   • Pancytopenia (HCC)     Dr King.  s/p marrow biopsy 11/2020   • Plantar fasciitis     Dr. Su.  hx R cortisone injection   • Prostatic enlargement 12/06/2017   • Pulmonary emphysema (HCC) 2020    PFTS 2020 showed mild hyperinflation and obstructive defect.   • PVC (premature ventricular contraction)    • REM sleep behavior disorder    • SCC (squamous cell carcinoma), hand, left 02/23/2022    Dr. Brown   • Screening for skin cancer     sees derm Dr. Anel Brown   • Septic shock (HCC) 04/04/2002    JW   • Spinal stenosis of lumbar region at multiple levels 12/06/2017    Dr. Saavedra.  s/p RFA L4-5, R L5-S1 11/23/22   • Thrombocytopenia    • Trigger finger     Dr. Gonzalez.  s/p R surgery       Past Surgical History:

## 2025-07-17 NOTE — ANESTHESIA POSTPROCEDURE EVALUATION
Department of Anesthesiology  Postprocedure Note    Patient: Thien Finn  MRN: [id number]  YOB: 1945  Date of evaluation: 7/17/2025    Procedure Summary       Date: 07/10/25 Room / Location: [facility] ENDO 03 / [facility] ENDOSCOPY    Anesthesia Start: 1357 Anesthesia Stop: 1425    Procedure: ESOPHAGOGASTRODUODENOSCOPY (Upper GI Region) Diagnosis:       Hepatocellular carcinoma (HCC)      (Hepatocellular carcinoma (HCC) [C22.0])    Surgeons: Uday Perales MD Responsible Provider: Austin Galeana MD    Anesthesia Type: TIVA ASA Status: 3            Anesthesia Type: TIVA    Joe Phase I: Joe Score: 10    Joe Phase II: Joe Score: 10    Anesthesia Post Evaluation    Patient location during evaluation: bedside  Nausea & Vomiting: no nausea  Cardiovascular status: blood pressure returned to baseline  Respiratory status: acceptable  Hydration status: euvolemic    No notable events documented.

## 2025-07-28 DIAGNOSIS — N40.0 BENIGN PROSTATIC HYPERPLASIA WITHOUT LOWER URINARY TRACT SYMPTOMS: ICD-10-CM

## 2025-07-28 RX ORDER — TAMSULOSIN HYDROCHLORIDE 0.4 MG/1
CAPSULE ORAL DAILY
Qty: 90 CAPSULE | Refills: 1 | Status: SHIPPED | OUTPATIENT
Start: 2025-07-28

## 2025-09-02 ENCOUNTER — HOSPITAL ENCOUNTER (OUTPATIENT)
Facility: HOSPITAL | Age: 80
Discharge: HOME | End: 2025-09-05
Attending: INTERNAL MEDICINE
Payer: MEDICARE

## 2025-09-02 VITALS — WEIGHT: 181 LBS | BODY MASS INDEX: 23.88 KG/M2

## 2025-09-02 DIAGNOSIS — K75.81 METABOLIC DYSFUNCTION-ASSOCIATED STEATOHEPATITIS (MASH): ICD-10-CM

## 2025-09-02 DIAGNOSIS — C22.0 HEPATOCELLULAR CARCINOMA (HCC): ICD-10-CM

## 2025-09-02 PROCEDURE — 74183 MRI ABD W/O CNTR FLWD CNTR: CPT

## 2025-09-02 PROCEDURE — 6360000004 HC RX CONTRAST MEDICATION: Performed by: RADIOLOGY

## 2025-09-02 PROCEDURE — A9579 GAD-BASE MR CONTRAST NOS,1ML: HCPCS | Performed by: RADIOLOGY

## 2025-09-02 RX ORDER — GADOTERIDOL 279.3 MG/ML
16 INJECTION INTRAVENOUS
Status: COMPLETED | OUTPATIENT
Start: 2025-09-02 | End: 2025-09-02

## 2025-09-02 RX ADMIN — GADOTERIDOL 16 ML: 279.3 INJECTION, SOLUTION INTRAVENOUS at 13:27

## (undated) DEVICE — CATH IV AUTOGRD BC PNK 20GA 25 -- INSYTE

## (undated) DEVICE — KIT COLON W/ 1.1OZ LUB AND 2 END

## (undated) DEVICE — ORISE PROKNIFE 3.0 MM ELECTRODE: Brand: ORISE™ PROKNIFE

## (undated) DEVICE — MULTIPLE BAND LIGATOR: Brand: SPEEDBAND SUPERVIEW SUPER 7

## (undated) DEVICE — SUTURE VICRYL + SZ 4-0 L27IN ABSRB UD PS-2 3/8 CIR REV CUT VCP426H

## (undated) DEVICE — BITEBLOCK ENDOSCP 60FR MAXI WHT POLYETH STURDY W/ VELC WVN

## (undated) DEVICE — BLUNTFILL: Brand: MONOJECT

## (undated) DEVICE — BLADELESS OBTURATOR: Brand: WECK VISTA

## (undated) DEVICE — BAG BELONG PT PERS CLEAR HANDL

## (undated) DEVICE — ELECTRODE,RADIOTRANSLUCENT,FOAM,3PK: Brand: MEDLINE

## (undated) DEVICE — TUBING INSUF 0.3UM FLTR W/ LUERLOCK CONN

## (undated) DEVICE — CONTAINER SPEC 20 ML LID NEUT BUFF FORMALIN 10 % POLYPR STS

## (undated) DEVICE — SET GRAV CK VLV NEEDLESS ST 3 GANGED 4WAY STPCOCK HI FLO 10

## (undated) DEVICE — Device

## (undated) DEVICE — FORCEPS BX L240CM JAW DIA2.4MM ORNG L CAP W/ NDL DISP RAD

## (undated) DEVICE — SYRINGE MED 3ML CLR PLAS STD N CTRL LUERLOCK TIP DISP

## (undated) DEVICE — SOLIDIFIER FLD 2OZ 1500CC N DISINF IN BTL DISP SAFESORB

## (undated) DEVICE — YANKAUER,TAPERED BULBOUS TIP,W/O VENT: Brand: MEDLINE

## (undated) DEVICE — 1200 GUARD II KIT W/5MM TUBE W/O VAC TUBE: Brand: GUARDIAN

## (undated) DEVICE — SUTURE VICRYL + SZ 3-0 L27IN ABSRB UD L26MM SH 1/2 CIR VCP416H

## (undated) DEVICE — CUFF RMFG BP INF SZ 11 DISP -- LAWSON OEM ITEM 238915

## (undated) DEVICE — LIQUIBAND RAPID ADHESIVE 36/CS 0.8ML: Brand: MEDLINE

## (undated) DEVICE — GLOVE SURG SZ 6 THK91MIL LTX FREE SYN POLYISOPRENE ANTI

## (undated) DEVICE — CANN NASAL O2 CAPNOGRAPHY AD -- FILTERLINE

## (undated) DEVICE — SET ADMIN 16ML TBNG L100IN 2 Y INJ SITE IV PIGGY BK DISP (ORDER IN MULIPLES OF 48)

## (undated) DEVICE — APPLIER CLP L9.375IN APER 2.1MM CLS L3.8MM 20 SM TI CLP

## (undated) DEVICE — BAG SPEC BIOHZRD 10 X 10 IN --

## (undated) DEVICE — CATH IV AUTOGRD BC BLU 22GA 25 -- INSYTE

## (undated) DEVICE — BLUNTFILL WITH FILTER: Brand: MONOJECT

## (undated) DEVICE — SEAL

## (undated) DEVICE — SET ADMIN 16ML TBNG L100IN 2 Y INJ SITE IV PIGGY BK DISP

## (undated) DEVICE — ELECTRODE PT RET AD L9FT HI MOIST COND ADH HYDRGEL CORDED

## (undated) DEVICE — CANNULA CUSH AD W/ 14FT TBG

## (undated) DEVICE — TRANSFER SET 3": Brand: MEDLINE INDUSTRIES, INC.

## (undated) DEVICE — SINGLE-USE POLYPECTOMY SNARE: Brand: CAPTIVATOR

## (undated) DEVICE — ADULT SPO2 SENSOR: Brand: NELLCOR

## (undated) DEVICE — ARM DRAPE

## (undated) DEVICE — FORCEPS BX L240CM JAW DIA2.8MM L CAP W/ NDL MIC MESH TOOTH

## (undated) DEVICE — CLICKLINE SCISSORS INSERT: Brand: CLICKLINE

## (undated) DEVICE — SYR 5ML 1/5 GRAD LL NSAF LF --

## (undated) DEVICE — SUTURE ABSORBABLE PGA-PCL UNIDIR ANTIBACT DYED KNOTLESS TISS CT-2

## (undated) DEVICE — LIGATOR ENDOSCP DIA8.6-11.5MM MULT DISP SPDBND LIGATOR SUP

## (undated) DEVICE — INFANT SPO2 SENSOR: Brand: NELLCOR

## (undated) DEVICE — INSUFFLATION NEEDLE TO ESTABLISH PNEUMOPERITONEUM.: Brand: INSUFFLATION NEEDLE

## (undated) DEVICE — ROBOTIC GENERAL-SFMC: Brand: MEDLINE INDUSTRIES, INC.

## (undated) DEVICE — 3M™ CUROS™ DISINFECTING CAP FOR NEEDLELESS CONNECTORS 270/CARTON 20 CARTONS/CASE CFF1-270: Brand: CUROS™

## (undated) DEVICE — ORISE PROKNIFE 1.5 MM ELECTRODE: Brand: ORISE™ PROKNIFE

## (undated) DEVICE — TUBING, SUCTION, 1/4" X 12', STRAIGHT: Brand: MEDLINE

## (undated) DEVICE — TRAP SURG QUAD PARABOLA SLOT DSGN SFTY SCRN TRAPEASE

## (undated) DEVICE — BASIN EMSIS 16OZ GRAPHITE PLAS KID SHP MOLD GRAD FOR ORAL

## (undated) DEVICE — TIP COVER ACCESSORY

## (undated) DEVICE — SOLUTION IRRIG 1000ML H2O PIC PLAS SHATTERPROOF CONTAINER

## (undated) DEVICE — SOLIDIFIER MEDC 1200ML -- CONVERT TO 356117